# Patient Record
Sex: FEMALE | Race: WHITE | NOT HISPANIC OR LATINO | Employment: OTHER | URBAN - METROPOLITAN AREA
[De-identification: names, ages, dates, MRNs, and addresses within clinical notes are randomized per-mention and may not be internally consistent; named-entity substitution may affect disease eponyms.]

---

## 2017-01-17 DIAGNOSIS — N31.9 NEUROGENIC BLADDER: Primary | ICD-10-CM

## 2017-02-16 ENCOUNTER — HOSPITAL ENCOUNTER (OUTPATIENT)
Dept: GENERAL RADIOLOGY | Facility: CLINIC | Age: 67
Discharge: HOME | End: 2017-02-16
Attending: UROLOGY | Admitting: UROLOGY
Payer: MEDICARE

## 2017-02-16 ENCOUNTER — HOSPITAL ENCOUNTER (OUTPATIENT)
Dept: ULTRASOUND IMAGING | Facility: CLINIC | Age: 67
End: 2017-02-16
Attending: UROLOGY
Payer: MEDICARE

## 2017-02-16 ENCOUNTER — OFFICE VISIT (OUTPATIENT)
Dept: UROLOGY | Facility: CLINIC | Age: 67
End: 2017-02-16
Payer: MEDICARE

## 2017-02-16 VITALS — HEIGHT: 64 IN | WEIGHT: 119 LBS | BODY MASS INDEX: 20.32 KG/M2

## 2017-02-16 DIAGNOSIS — N31.9 NEUROGENIC BLADDER: ICD-10-CM

## 2017-02-16 DIAGNOSIS — N31.9 NEUROGENIC BLADDER: Primary | ICD-10-CM

## 2017-02-16 LAB — PR INTERVAL - MUSE: 15

## 2017-02-16 PROCEDURE — 76770 US EXAM ABDO BACK WALL COMP: CPT

## 2017-02-16 PROCEDURE — 74010 XR KUB: CPT | Mod: 52

## 2017-02-16 PROCEDURE — 99213 OFFICE O/P EST LOW 20 MIN: CPT | Mod: 25 | Performed by: UROLOGY

## 2017-02-16 PROCEDURE — 51798 US URINE CAPACITY MEASURE: CPT | Performed by: UROLOGY

## 2017-02-16 ASSESSMENT — PAIN SCALES - GENERAL: PAINLEVEL: NO PAIN (0)

## 2017-02-16 NOTE — LETTER
2/16/2017       RE: Sondra Castro  [address]  [place] [address]     Dear Colleague,    Thank you for referring your patient, Sondra Castro, to the [facility]OLOG[facility] [place] at [facility]. Please see a copy of my visit note below.    History: It is a great pleasure to see this very pleasant 66-year-old lady in follow-up consultation today.  She has a history of multiple sclerosis for many years which is now stable.  She has had no major flareups over the last 12 months.  She does take baclofen on a p.r.n. basis previous dilatation of the left renal pelvis as been noted on ultrasound examination although previous retrograde pyelography had shown no serious cause dilatation or obstruction it was decided that this required no treatment  Dexterity remained satisfactory, she does need occasional help with balance  Postvoid residual volume is 15 cc today.  There are no other remarkable features  KUB today shows no evidence of stones or other significant findings.  Ultrasound of the kidneys today shows improvement in dilatation of the left kidney and no other remarkable features    Past Medical History   Diagnosis Date     Anxiety      Fracture of right lower leg      Macular degeneration, dry      Left Eye     Macular degeneration, wet (H)      Right Eye     Menopausal disorder      age 49     Mitral valve disorders      Multiple sclerosis (H)      Optic neuritis 2007     PVD (posterior vitreous detachment), right eye      Unspecified nonsenile cataract      Right eye       Social History     Social History     Marital status:      Spouse name: N/A     Number of children: 7     Years of education: N/A     Occupational History     homemaker      Social History Main Topics     Smoking status: Former Smoker     Packs/day: 1.00     Types: Cigarettes     Quit date: 1/1/1981     Smokeless tobacco: Never Used     Alcohol use No     Drug use: No      "Sexual activity: Not Currently     Other Topics Concern      Service No     Blood Transfusions No     IVIG every 6 weeks     Caffeine Concern Yes     7 cups of coffee per day     Occupational Exposure No     Hobby Hazards No     Sleep Concern Yes     MS - night leg spasms     Stress Concern Yes     Weight Concern No     Special Diet Yes     avoids sugar     Back Care Yes     back pain     Exercise Yes     5 times per week - biking     Bike Helmet No     Seat Belt Yes     Self-Exams No     Social History Narrative       Past Surgical History   Procedure Laterality Date     Surgical history of -   10/05/04     Colonoscopy     Surgical history of -        Neuroma x 2      Hc vitrectomy with focal endolaser photocoagulation  8/8/2013     \"for glaucoma\" 5 separate times LE     Avastin (bevacizumab) 1.25 mg intravitreal injection od (right eye) Right      last 7/9/13       Family History   Problem Relation Age of Onset     C.A.D. Mother      CEREBROVASCULAR DISEASE Mother      Macular Degeneration Mother      Rheumatologic Disease Mother      Retinal detachment Mother      C.A.D. Father      DIABETES Father      Prostate Cancer Father      Macular Degeneration Father      Glaucoma Father      Hypertension No family hx of      Breast Cancer No family hx of      Cancer - colorectal No family hx of      Thyroid Disease Son          Current Outpatient Prescriptions:      Denosumab (PROLIA SC), , Disp: , Rfl:      VITAMIN D, CHOLECALCIFEROL, PO, Take by mouth daily, Disp: , Rfl:      aspirin 81 MG tablet, Take 1 tablet by mouth daily, Disp: , Rfl:      BEVACIZUMAB, AVASTIN, INJECTION 2.5MG, , Disp: , Rfl:      Multiple Vitamins-Minerals (PRESERVISION AREDS 2 PO), Take 2 capsules by mouth daily, Disp: , Rfl:      ARTIFICIAL TEARS 0.1-0.3 % SOLN, Apply 1 drop to eye as needed, Disp: , Rfl:      ascorbic acid (VITAMIN C) 1000 MG TABS, Take 1,000 mg by mouth daily, Disp: , Rfl:      fish oil-omega-3 fatty acids (FISH OIL) " "1000 MG capsule, Take 2 capsules by mouth daily., Disp: 180 capsule, Rfl: 3     CALCIUM 500 500 MG OR TABS, Take 3 tablets by mouth, Disp: , Rfl:      MULTIVITAMIN OR, Take 1 tablet by mouth., Disp: , Rfl:      TiZANidine HCl (ZANAFLEX PO), Reported on 2/16/2017, Disp: , Rfl:      cyclobenzaprine (FLEXERIL) 10 MG tablet, Take 1 tablet (10 mg) by mouth nightly as needed for muscle spasms (Patient not taking: Reported on 2/16/2017), Disp: 30 tablet, Rfl: 5    10 point ROS of systems including Constitutional, Eyes, Respiratory, Cardiovascular, Gastroenterology, Genitourinary, Integumentary, Muscularskeletal, Psychiatric were all negative except for pertinent positives noted in my HPI.    Examination:   Ht 1.613 m (5' 3.5\")  Wt 54 kg (119 lb)  LMP 04/01/1998  BMI 20.75 kg/m2  General Impression: Very pleasant lady in no acute distress, well-oriented in time place and person  Mental Status: Normal.  HEENT.  There is no evidence of jaundice there is slight paleness of the mucous membranes.  There are no other remarkable ages.    Skin: Skin is otherwise normal to examination  Respiratory System: The respiratory cycle is normal  Lymph Nodes: Not examined  Back/Flank Tenderness: There is no flank tenderness  Cardiovascular System: Not examined  Abdominal Examination: Not examined  Extremities: There is no significant peripheral edema  Genitial: Not examined  Rectal Examination: Not examined  Neurologic System: There has been no significant change in her neurological signs since previous examination    Impression: The situation is very stable.  There is no evidence of significant delay or obstruction and drainage of the bladder.  There is no evidence of any renal deterioration.  Symptoms of multiple sclerosis a very stable.  I would not change current management at this time  I did discuss the entire situation with the patient in detail.  I will see her promptly should there be any significant change in the situation.  I " "answered all questions    Plan: I will see her in 1 year for KUB, bladder scan, ultrasound of kidneys and clinical examination    Time: 15 minutes.  Greater than 50% was spent in discussion and consultation    \"This dictation was performed with voice recognition software and may contain errors,  o omissions and inadvertent word substitution.\"        Again, thank you for allowing me to participate in the care of your patient.      Sincerely,    Pee Agee MD      "

## 2017-02-16 NOTE — NURSING NOTE
Chief Complaint   Patient presents with     Clinic Care Coordination - Follow-up     Go over Results     Arina Wolfe LPN

## 2017-02-16 NOTE — PROGRESS NOTES
History: It is a great pleasure to see this very pleasant 66-year-old lady in follow-up consultation today.  She has a history of multiple sclerosis for many years which is now stable.  She has had no major flareups over the last 12 months.  She does take baclofen on a p.r.n. basis previous dilatation of the left renal pelvis as been noted on ultrasound examination although previous retrograde pyelography had shown no serious cause dilatation or obstruction it was decided that this required no treatment  Dexterity remained satisfactory, she does need occasional help with balance  Postvoid residual volume is 15 cc today.  There are no other remarkable features  KUB today shows no evidence of stones or other significant findings.  Ultrasound of the kidneys today shows improvement in dilatation of the left kidney and no other remarkable features    Past Medical History   Diagnosis Date     Anxiety      Fracture of right lower leg      Macular degeneration, dry      Left Eye     Macular degeneration, wet (H)      Right Eye     Menopausal disorder      age 49     Mitral valve disorders      Multiple sclerosis (H)      Optic neuritis 2007     PVD (posterior vitreous detachment), right eye      Unspecified nonsenile cataract      Right eye       Social History     Social History     Marital status:      Spouse name: N/A     Number of children: 7     Years of education: N/A     Occupational History     homemaker      Social History Main Topics     Smoking status: Former Smoker     Packs/day: 1.00     Types: Cigarettes     Quit date: 1/1/1981     Smokeless tobacco: Never Used     Alcohol use No     Drug use: No     Sexual activity: Not Currently     Other Topics Concern      Service No     Blood Transfusions No     IVIG every 6 weeks     Caffeine Concern Yes     7 cups of coffee per day     Occupational Exposure No     Hobby Hazards No     Sleep Concern Yes     MS - night leg spasms     Stress Concern Yes      "Weight Concern No     Special Diet Yes     avoids sugar     Back Care Yes     back pain     Exercise Yes     5 times per week - biking     Bike Helmet No     Seat Belt Yes     Self-Exams No     Social History Narrative       Past Surgical History   Procedure Laterality Date     Surgical history of -   10/05/04     Colonoscopy     Surgical history of -        Neuroma x 2      Hc vitrectomy with focal endolaser photocoagulation  8/8/2013     \"for glaucoma\" 5 separate times LE     Avastin (bevacizumab) 1.25 mg intravitreal injection od (right eye) Right      last 7/9/13       Family History   Problem Relation Age of Onset     C.A.D. Mother      CEREBROVASCULAR DISEASE Mother      Macular Degeneration Mother      Rheumatologic Disease Mother      Retinal detachment Mother      C.A.D. Father      DIABETES Father      Prostate Cancer Father      Macular Degeneration Father      Glaucoma Father      Hypertension No family hx of      Breast Cancer No family hx of      Cancer - colorectal No family hx of      Thyroid Disease Son          Current Outpatient Prescriptions:      Denosumab (PROLIA SC), , Disp: , Rfl:      VITAMIN D, CHOLECALCIFEROL, PO, Take by mouth daily, Disp: , Rfl:      aspirin 81 MG tablet, Take 1 tablet by mouth daily, Disp: , Rfl:      BEVACIZUMAB, AVASTIN, INJECTION 2.5MG, , Disp: , Rfl:      Multiple Vitamins-Minerals (PRESERVISION AREDS 2 PO), Take 2 capsules by mouth daily, Disp: , Rfl:      ARTIFICIAL TEARS 0.1-0.3 % SOLN, Apply 1 drop to eye as needed, Disp: , Rfl:      ascorbic acid (VITAMIN C) 1000 MG TABS, Take 1,000 mg by mouth daily, Disp: , Rfl:      fish oil-omega-3 fatty acids (FISH OIL) 1000 MG capsule, Take 2 capsules by mouth daily., Disp: 180 capsule, Rfl: 3     CALCIUM 500 500 MG OR TABS, Take 3 tablets by mouth, Disp: , Rfl:      MULTIVITAMIN OR, Take 1 tablet by mouth., Disp: , Rfl:      TiZANidine HCl (ZANAFLEX PO), Reported on 2/16/2017, Disp: , Rfl:      cyclobenzaprine (FLEXERIL) " "10 MG tablet, Take 1 tablet (10 mg) by mouth nightly as needed for muscle spasms (Patient not taking: Reported on 2/16/2017), Disp: 30 tablet, Rfl: 5    10 point ROS of systems including Constitutional, Eyes, Respiratory, Cardiovascular, Gastroenterology, Genitourinary, Integumentary, Muscularskeletal, Psychiatric were all negative except for pertinent positives noted in my HPI.    Examination:   Ht 1.613 m (5' 3.5\")  Wt 54 kg (119 lb)  LMP 04/01/1998  BMI 20.75 kg/m2  General Impression: Very pleasant lady in no acute distress, well-oriented in time place and person  Mental Status: Normal.  HEENT.  There is no evidence of jaundice there is slight paleness of the mucous membranes.  There are no other remarkable ages.    Skin: Skin is otherwise normal to examination  Respiratory System: The respiratory cycle is normal  Lymph Nodes: Not examined  Back/Flank Tenderness: There is no flank tenderness  Cardiovascular System: Not examined  Abdominal Examination: Not examined  Extremities: There is no significant peripheral edema  Genitial: Not examined  Rectal Examination: Not examined  Neurologic System: There has been no significant change in her neurological signs since previous examination    Impression: The situation is very stable.  There is no evidence of significant delay or obstruction and drainage of the bladder.  There is no evidence of any renal deterioration.  Symptoms of multiple sclerosis a very stable.  I would not change current management at this time  I did discuss the entire situation with the patient in detail.  I will see her promptly should there be any significant change in the situation.  I answered all questions    Plan: I will see her in 1 year for KUB, bladder scan, ultrasound of kidneys and clinical examination    Time: 15 minutes.  Greater than 50% was spent in discussion and consultation    \"This dictation was performed with voice recognition software and may contain errors,  o omissions and " "inadvertent word substitution.\"      "

## 2017-02-16 NOTE — MR AVS SNAPSHOT
After Visit Summary   2/16/2017    Sondra Castro    MRN: [id number]           Patient Information     Date Of Birth          1950        Visit Information        Provider Department      2/16/2017 2:20 PM Pee Agee MD [facility] [place]        Today's Diagnoses     Neurogenic bladder    -  1       Follow-ups after your visit        Follow-up notes from your care team     Return for Physical Exam, KUB, ultrasound of kidneys, AUA score and bladder scan.      Your next 10 appointments already scheduled     Jun 12, 2017 12:00 PM CDT   VISUAL FIELD with [facility] EYE VISUAL FIELD   Eye Clinic ([facility])    [address]  [address]  [address]  [facility] [address]   [phone]            Jun 12, 2017 12:30 PM CDT   RETURN GLAUCOMA with Nanci Briscoe MD   Eye Clinic ([facility])    [address]  [address]  [address]  [facility] [address]   [phone]            Jul 06, 2017 12:15 PM CDT   RETURN RETINA with Kesha Ptael MD   Eye Clinic ([facility])    [address]  [address]  [address]  [facility] [address]   [phone]              Future tests that were ordered for you today     Open Future Orders        Priority Expected Expires Ordered    US Renal Complete [[id number]] Routine 5/17/2017 2/16/2018 2/16/2017    XR KUB [[id number]] Routine 2/16/2017 2/16/2018 2/16/2017    UA without Microscopic Routine  2/16/2018 2/16/2017            Who to contact     If you have questions or need follow up information about today's clinic visit or your schedule please contact [facility] UROLOGY CLIN[facility] [place] directly at [phone].  Normal or non-critical lab and imaging results will be communicated to you by MyChart, letter or phone within 4 business days after the clinic has received the results. If you do not hear from us within 7 days,  "please contact the clinic through QuadROI or phone. If you have a critical or abnormal lab result, we will notify you by phone as soon as possible.  Submit refill requests through QuadROI or call your pharmacy and they will forward the refill request to us. Please allow 3 business days for your refill to be completed.          Additional Information About Your Visit        DwllrharDSO Interactive Information     QuadROI lets you send messages to your doctor, view your test results, renew your prescriptions, schedule appointments and more. To sign up, go to [email or web address] . Click on \"Log in\" on the left side of the screen, which will take you to the Welcome page. Then click on \"Sign up Now\" on the right side of the page.     You will be asked to enter the access code listed below, as well as some personal information. Please follow the directions to create your username and password.     Your access code is: [email or web address]  Expires: 2017  3:01 PM     Your access code will  in 90 days. If you need help or a new code, please call your [place] clinic or [phone].        Care EveryWhere ID     This is your Care EveryWhere ID. This could be used by other organizations to access your [place] medical records  [id number]        Your Vitals Were     Height Last Period BMI (Body Mass Index)             1.613 m (5' 3.5\") 1998 20.75 kg/m2          Blood Pressure from Last 3 Encounters:   01/08/15 125/61   14 120/66   10/06/14 112/66    Weight from Last 3 Encounters:   17 54 kg (119 lb)   01/08/15 54 kg (119 lb)   14 54 kg (119 lb)              We Performed the Following     MEASURE POST-VOID RESIDUAL URINE/BLADDER CAPACITY, US NON-IMAGING ([id number])        Primary Care Provider Office Phone # Fax #    Kesha Prieto [phone]       [facility] [address]  [place] [address]        Thank you!     Thank you for choosing [facility] UROLOGY " [facility]  for your care. Our goal is always to provide you with excellent care. Hearing back from our patients is one way we can continue to improve our services. Please take a few minutes to complete the written survey that you may receive in the mail after your visit with us. Thank you!             Your Updated Medication List - Protect others around you: Learn how to safely use, store and throw away your medicines at www.disposemymeds.org.          This list is accurate as of: 2/16/17  3:01 PM.  Always use your most recent med list.                   Brand Name Dispense Instructions for use    ascorbic acid 1000 MG Tabs    vitamin C     Take 1,000 mg by mouth daily       aspirin 81 MG tablet      Take 1 tablet by mouth daily       BEVACIZUMAB (AVASTIN) INJECTION 2.5MG          calcium 500 1250 (500 CA) MG Tabs tablet   Generic drug:  calcium carbonate      Take 3 tablets by mouth       cyclobenzaprine 10 MG tablet    FLEXERIL    30 tablet    Take 1 tablet (10 mg) by mouth nightly as needed for muscle spasms       fish oil-omega-3 fatty acids 1000 MG capsule     180 capsule    Take 2 capsules by mouth daily.       hypromellose-dextran 0.3-0.1% opthalmic solution      Apply 1 drop to eye as needed       * PRESERVISION AREDS 2 PO      Take 2 capsules by mouth daily       * MULTIVITAMIN PO      Take 1 tablet by mouth.       PROLIA SC          VITAMIN D (CHOLECALCIFEROL) PO      Take by mouth daily       ZANAFLEX PO      Reported on 2/16/2017       * Notice:  This list has 2 medication(s) that are the same as other medications prescribed for you. Read the directions carefully, and ask your doctor or other care provider to review them with you.

## 2017-06-12 ENCOUNTER — OFFICE VISIT (OUTPATIENT)
Dept: OPHTHALMOLOGY | Facility: CLINIC | Age: 67
End: 2017-06-12
Attending: OPHTHALMOLOGY
Payer: MEDICARE

## 2017-06-12 DIAGNOSIS — H40.003 GLAUCOMA SUSPECT OF BOTH EYES: Primary | ICD-10-CM

## 2017-06-12 PROCEDURE — 92083 EXTENDED VISUAL FIELD XM: CPT | Mod: ZF | Performed by: OPHTHALMOLOGY

## 2017-06-12 PROCEDURE — 99213 OFFICE O/P EST LOW 20 MIN: CPT | Mod: ZF

## 2017-06-12 PROCEDURE — 92015 DETERMINE REFRACTIVE STATE: CPT | Mod: GY,ZF

## 2017-06-12 NOTE — MR AVS SNAPSHOT
After Visit Summary   6/12/2017    Sondra Castro    MRN: [id number]           Patient Information     Date Of Birth          1950        Visit Information        Provider Department      6/12/2017 12:30 PM Nanci Briscoe MD Eye Clinic        Today's Diagnoses     Glaucoma suspect of both eyes    -  1       Follow-ups after your visit        Follow-up notes from your care team     Return in about 1 year (around 6/12/2018) for visual field LVC, OCT rnfl.      Your next 10 appointments already scheduled     Jul 06, 2017 12:15 PM CDT   RETURN RETINA with Kesha Patel MD   Eye Clinic ([facility])    [address]  [address]   [address] [address]  [facility] [address] [phone]            Jun 13, 2018 12:45 PM CDT   RETURN GLAUCOMA with Nanci Briscoe MD   Eye Clinic ([facility])    [address]  [address]   [address] [address]  [facility] [id number] [phone]              Who to contact     Please call your clinic at [phone] to:    Ask questions about your health    Make or cancel appointments    Discuss your medicines    Learn about your test results    Speak to your doctor   If you have compliments or concerns about an experience at your clinic, or if you wish to file a complaint, please contact [facility] Patient Relations at [phone] or email us at [email or web address]         Additional Information About Your Visit        MyChart Information     MadRat Gamest is an electronic gateway that provides easy, online access to your medical records. With [employer], you can request a clinic appointment, read your test results, renew a prescription or communicate with your care team.     To sign up for Mad[employer] Gamest visit the website at [email or web address]   You will be asked to enter the access code listed below, as well as some personal information. Please follow the directions to create your  username and password.     Your access code is: [email or web address]  Expires: 2017  6:31 AM     Your access code will  in 90 days. If you need help or a new code, please contact your [facility] or call [phone] for assistance.        Care EveryWhere ID     This is your Care EveryWhere ID. This could be used by other organizations to access your [place] medical records  [id number]        Your Vitals Were     Last Period                   1998            Blood Pressure from Last 3 Encounters:   01/08/15 125/61   14 120/66   10/06/14 112/66    Weight from Last 3 Encounters:   17 54 kg (119 lb)   01/08/15 54 kg (119 lb)   14 54 kg (119 lb)              We Performed the Following     [facility]         Primary Care Provider Office Phone # Fax #    Kesha Prieto [phone]       [facility] [address] [facility] [address]        Thank you!     Thank you for choosing EYE CLINIC  for your care. Our goal is always to provide you with excellent care. Hearing back from our patients is one way we can continue to improve our services. Please take a few minutes to complete the written survey that you may receive in the mail after your visit with us. Thank you!             Your Updated Medication List - Protect others around you: Learn how to safely use, store and throw away your medicines at www.disposemymeds.org.          This list is accurate as of: 17 11:59 PM.  Always use your most recent med list.                   Brand Name Dispense Instructions for use    ascorbic acid 1000 MG Tabs    vitamin C     Take 1,000 mg by mouth daily       aspirin 81 MG tablet      Take 1 tablet by mouth daily       BEVACIZUMAB (AVASTIN) INJECTION 2.5MG          calcium 500 1250 (500 CA) MG Tabs tablet   Generic drug:  calcium carbonate      Take 3 tablets by mouth       cyclobenzaprine 10 MG tablet    FLEXERIL    30 tablet     Take 1 tablet (10 mg) by mouth nightly as needed for muscle spasms       fish oil-omega-3 fatty acids 1000 MG capsule     180 capsule    Take 2 capsules by mouth daily.       hypromellose-dextran 0.3-0.1% opthalmic solution      Apply 1 drop to eye as needed       * PRESERVISION AREDS 2 PO      Take 2 capsules by mouth daily       * MULTIVITAMIN PO      Take 1 tablet by mouth.       PROLIA SC          VITAMIN D (CHOLECALCIFEROL) PO      Take by mouth daily       ZANAFLEX PO      Reported on 2/16/2017       * Notice:  This list has 2 medication(s) that are the same as other medications prescribed for you. Read the directions carefully, and ask your doctor or other care provider to review them with you.

## 2017-06-15 ASSESSMENT — EXTERNAL EXAM - LEFT EYE: OS_EXAM: NORMAL

## 2017-06-15 ASSESSMENT — CUP TO DISC RATIO
OD_RATIO: 0.3
OS_RATIO: 0.4

## 2017-06-15 ASSESSMENT — SLIT LAMP EXAM - LIDS
COMMENTS: NORMAL
COMMENTS: NORMAL

## 2017-06-15 ASSESSMENT — EXTERNAL EXAM - RIGHT EYE: OD_EXAM: NORMAL

## 2017-06-15 NOTE — PROGRESS NOTES
History of suspect glaucoma s/p SLT (or ALT) both eyes, presenting for follow up.    HPI:   OK but has ache left eye for a couple of weeks.     Current Medications:   None    Octopus LVC left eye Similar to previous mena visual field (HVF) size V with some scatter but fairly normal    OCT retinal nerve fiber layer stable both eyes    Right eye normal   Left eye thin ST as before    1. Primary open angle glaucoma (POAG) suspect with mild cup:disc asymmetry   LVC full both eyes     Scatter on visual field left eye but also has diagnosis of optic neuritis left eye   OCT stable both eyes    Not on glaucoma drops currently   S/p Selected laser trabeculoplasty (SLT) or Argon laser trabeculoplasty both eyes     3. Narrow angle right eye with plateau configuation, stable   - Consider laser peripheral iridotomy (LPI) if progressive    4. Optic neuritis left eye - history of multiple sclerosis     5. Migraine aura without headache    Now with brief aura (few seconds) fairly frequently   Seen by Dr Hernandez in past    6. Age related macular degeneration    Seen by Dr. Patel   History of Wet Age related macular degeneration right eye; Dry left eye      Avastin x 4 right eye (most recent 7/9/2013)    7. Senile nuclear sclerosis both eyes    Recently started noting streamers around lights at night    Plan:   Return to clinic 1 year with LVC visual field both eyes and OCT retinal nerve fiber layer     Attending Physician Attestation:  Complete documentation of historical and exam elements from today's encounter can be found in the full encounter summary report (not reduplicated in this progress note). I personally obtained the chief complaint(s) and history of present illness. I confirmed and edited asnecessary the review of systems, past medical/surgical history, family history, social history, and examination findings as documented by others; and I examined the patient myself. I personally reviewed the relevant tests, images,  and reports as documented above. I formulated and edited as necessary the assessment and plan and discussed the findings and management plan with the patient and family.  EPIC (EMR) was not functioning during this appointment and the data was entered after the appointment when EPIC was available.

## 2017-06-16 ASSESSMENT — TONOMETRY
IOP_METHOD: APPLANATION
OD_IOP_MMHG: 15
OS_IOP_MMHG: 16

## 2017-06-16 ASSESSMENT — REFRACTION_MANIFEST
OD_AXIS: 015
OD_CYLINDER: +1.25
OD_SPHERE: +2.75
OS_CYLINDER: SPHERE
OS_ADD: +2.75
OS_SPHERE: +3.00
OD_ADD: +2.75

## 2017-06-16 ASSESSMENT — VISUAL ACUITY
OS_SC: 20/30
OS_SC+: -1
OD_SC+: -1
METHOD: SNELLEN - LINEAR
OD_SC: 20/30

## 2017-06-16 ASSESSMENT — CONF VISUAL FIELD
OS_NORMAL: 1
OD_NORMAL: 1

## 2017-06-16 NOTE — NURSING NOTE
Chief Complaints and History of Present Illnesses   Patient presents with     Glaucoma Suspect Follow Up     HPI    Affected eye(s):  Both   Symptoms:     No blurred vision   No decreased vision         Do you have eye pain now?:  No      Comments:  Lily ANDREWS 9:08 AM June 16, 2017

## 2017-06-29 DIAGNOSIS — H35.3130 BILATERAL DRY AGE-RELATED MACULAR DEGENERATION: Primary | ICD-10-CM

## 2017-07-20 ENCOUNTER — OFFICE VISIT (OUTPATIENT)
Dept: OPHTHALMOLOGY | Facility: CLINIC | Age: 67
End: 2017-07-20
Attending: OPHTHALMOLOGY
Payer: MEDICARE

## 2017-07-20 DIAGNOSIS — H35.3130 BILATERAL DRY AGE-RELATED MACULAR DEGENERATION: ICD-10-CM

## 2017-07-20 PROCEDURE — 92250 FUNDUS PHOTOGRAPHY W/I&R: CPT | Mod: ZF | Performed by: OPHTHALMOLOGY

## 2017-07-20 PROCEDURE — 92134 CPTRZ OPH DX IMG PST SGM RTA: CPT | Mod: ZF | Performed by: OPHTHALMOLOGY

## 2017-07-20 PROCEDURE — 99212 OFFICE O/P EST SF 10 MIN: CPT | Mod: ZF

## 2017-07-20 ASSESSMENT — EXTERNAL EXAM - RIGHT EYE: OD_EXAM: NORMAL

## 2017-07-20 ASSESSMENT — EXTERNAL EXAM - LEFT EYE: OS_EXAM: NORMAL

## 2017-07-20 ASSESSMENT — REFRACTION_WEARINGRX
OD_SPHERE: +3.00
OS_AXIS: 055
OD_CYLINDER: +1.75
OS_CYLINDER: +0.25
OS_ADD: +2.50
OD_ADD: +2.50
SPECS_TYPE: PAL
OS_SPHERE: +3.75
OD_AXIS: 178

## 2017-07-20 ASSESSMENT — TONOMETRY
OS_IOP_MMHG: 13
IOP_METHOD: TONOPEN
OD_IOP_MMHG: 10

## 2017-07-20 ASSESSMENT — CUP TO DISC RATIO
OD_RATIO: 0.3
OS_RATIO: 0.4

## 2017-07-20 ASSESSMENT — CONF VISUAL FIELD
OD_NORMAL: 1
OS_NORMAL: 1

## 2017-07-20 ASSESSMENT — VISUAL ACUITY
OD_CC: 20/30
CORRECTION_TYPE: GLASSES
OS_CC: 20/25
METHOD: SNELLEN - LINEAR

## 2017-07-20 ASSESSMENT — SLIT LAMP EXAM - LIDS
COMMENTS: NORMAL
COMMENTS: NORMAL

## 2017-07-20 NOTE — MR AVS SNAPSHOT
After Visit Summary   7/20/2017    Sondra Castro    MRN: [id number]           Patient Information     Date Of Birth          1950        Visit Information        Provider Department      7/20/2017 1:00 PM Kesha Patel MD [facility] Clinic        Today's Diagnoses     Bilateral dry age-related macular degeneration           Follow-ups after your visit        Follow-up notes from your care team     Return in about 6 months (around 1/20/2018) for OCT AF.      Your next 10 appointments already scheduled     Jan 18, 2018 12:45 PM CST   RETURN RETINA with Kesha Patel MD   Eye Clinic ([facility])    [address]  [address]   [address] Clin [address]  [facility] [address] [phone]            Jun 13, 2018 12:45 PM CDT   RETURN GLAUCOMA with Nanci Briscoe MD   Eye Clinic ([facility])    [address]  [address]   [address] Clin [address]  [facility] [address] [phone]              Who to contact     Please call your clinic at [phone] to:    Ask questions about your health    Make or cancel appointments    Discuss your medicines    Learn about your test results    Speak to your doctor   If you have compliments or concerns about an experience at your clinic, or if you wish to file a complaint, please contact [facility] Patient Relations at [phone] or email us at [email or web address]         Additional Information About Your Visit        MyChart Information     [employer] is an electronic gateway that provides easy, online access to your medical records. With [employer], you can request a clinic appointment, read your test results, renew a prescription or communicate with your care team.     To sign up for [employer] visit the website at [email or web address]   You will be asked to enter the access code listed below, as well as some personal information. Please follow the directions to create your  username and password.     Your access code is: [email or web address]  Expires: 2017  6:31 AM     Your access code will  in 90 days. If you need help or a new code, please contact your [facility] or call [phone] for assistance.        Care EveryWhere ID     This is your Care EveryWhere ID. This could be used by other organizations to access your [facility] medical records  [id number]        Your Vitals Were     Last Period                   1998            Blood Pressure from Last 3 Encounters:   01/08/15 125/61   14 120/66   10/06/14 112/66    Weight from Last 3 Encounters:   17 54 kg (119 lb)   01/08/15 54 kg (119 lb)   14 54 kg (119 lb)              We Performed the Following     Fundus Autofluorescence Image (FAF) OU (both eyes)     OCT Retina Spectralis OU (both eyes)        Primary Care Provider Office Phone # Fax #    Kesha Prieto [phone]       [address] [address]        Equal Access to Services     [facility]: Hadii aad ku hadasho Sosudhaali, waaxda luqadaha, qaybta kaalmada adeashyada, mary mehta . So [facility] [phone].    ATENCIÓN: Si habla español, tiene a panchal disposición servicios gratuitos de asistencia lingüística. Ll[facility] [phone].    We comply with applicable federal civil rights laws and [place] laws. We do not discriminate on the basis of race, color, national origin, age, disability sex, sexual orientation or gender identity.            Thank you!     Thank you for choosing EYE CLINIC  for your care. Our goal is always to provide you with excellent care. Hearing back from our patients is one way we can continue to improve our services. Please take a few minutes to complete the written survey that you may receive in the mail after your visit with us. Thank you!             Your Updated Medication List - Protect others around you: Learn  how to safely use, store and throw away your medicines at www.disposemymeds.org.          This list is accurate as of: 7/20/17  2:15 PM.  Always use your most recent med list.                   Brand Name Dispense Instructions for use Diagnosis    ascorbic acid 1000 MG Tabs    vitamin C     Take 1,000 mg by mouth daily        aspirin 81 MG tablet      Take 1 tablet by mouth daily        BEVACIZUMAB (AVASTIN) INJECTION 2.5MG           calcium 500 1250 (500 CA) MG Tabs tablet   Generic drug:  calcium carbonate      Take 3 tablets by mouth        cyclobenzaprine 10 MG tablet    FLEXERIL    30 tablet    Take 1 tablet (10 mg) by mouth nightly as needed for muscle spasms    Sciatica, unspecified laterality       fish oil-omega-3 fatty acids 1000 MG capsule     180 capsule    Take 2 capsules by mouth daily.    Routine general medical examination at a health care facility       hypromellose-dextran 0.3-0.1% opthalmic solution      Apply 1 drop to eye as needed        * PRESERVISION AREDS 2 PO      Take 2 capsules by mouth daily        * MULTIVITAMIN PO      Take 1 tablet by mouth.        PROLIA SC           VITAMIN D (CHOLECALCIFEROL) PO      Take by mouth daily        ZANAFLEX PO      Reported on 2/16/2017        * Notice:  This list has 2 medication(s) that are the same as other medications prescribed for you. Read the directions carefully, and ask your doctor or other care provider to review them with you.

## 2017-07-20 NOTE — NURSING NOTE
Chief Complaints and History of Present Illnesses   Patient presents with     Follow Up For     s/p Bilateral dry age-related macular degeneration (Primary Dx)     HPI    Affected eye(s):  Both   Symptoms:     No blurred vision   No decreased vision   No floaters   No flashes   Tearing   Dryness      Duration:  6 months   Frequency:  Constant       Do you have eye pain now?:  No      Comments:  States va is the same since last visit.   Galen Fam  12:44 PM July 20, 2017

## 2017-07-20 NOTE — LETTER
7/20/2017       RE: Sondra Castro  [address] [place] [place] [address]     Dear Colleague,    Thank you for referring your patient, Sondra Castro, to the EYE CLINIC at [facility]. Please see a copy of my visit note below.    CC: follow up Age related macular degeneration     HPI: no change from last visit. Vision is worse at near. Still notes persistent shimmering lights right eye  especially when exercising. Has previously been diagnosed with Ocular Migraines and has seen Dr. Hernandez and is followed by Dr. Ames. Symptoms have been different as of late. No pain, irritation, discomfort or changes in floaters.    OCT 7-20-17  Right eye: drusen Retinal pigment epithelium changes, no subretinal fluid; stable  Left eye: few drusen; stable    FAF 7-20-17  RE: mottled nasal macular hypo/hyperAF, stable  LE: minimal ST macular hypoAF, stable    Assessment & Plan:  1. History of Exudative Age related macular degeneration right eye     - s/p previous Avastin (x4 in 7/9/2013)   - No activity   - Observe    2. mild Age related macular degeneration left eye    - AREDS vitamins, Amsler grid monitoring weekly OCT stable, no injections needed    - Continue to Monitor    3. Posterior vitreous detachment (PVD) both eyes    - Retina detachment precautions were discussed with the patient (presence or increased in flashes, floaters or a curtain in the visual field) and was asked to return if any of the those occur    4. Primary open angle glaucoma (POAG) suspect with mild cup:disc asymmetry  - Mildly abnormal visual field left eye in April  - IOP and exam stable, not on glaucoma drops currently  - S/p Selected laser trabeculoplasty (SLT) or Argon laser trabeculoplasty both eyes   - Narrow angle right eye with plateau configuation but both angles open.  - Followed by Dr. Briscoe    4. Optic neuritis left eye and history of Migraine aura without headache   - f/b Dr Hernandez. History of multiple sclerosis      5. Cataracts both eyes - observe for now    6. History of multiple sclerosis diagnosed several ys ago  History of uveitis both eyes   History of taken IV IG  And steroids  currently without  Treatment    Return to clinic 6-9 months, OCT and autofluorescence both eyes     Pedro Salinas MD, PhD  Vitreoretinal Surgery Fellow      ~~~~~~~~~~~~~~~~~~~~~~~~~~~~~~~~~~   Complete documentation of historical and exam elements from today's encounter can be found in the full encounter summary report (not reduplicated in this progress note).  I personally obtained the chief complaint(s) and history of present illness.  I confirmed and edited as necessary the review of systems, past medical/surgical history, family history, social history, and examination findings as documented by others; and I examined the patient myself.  I personally reviewed the relevant tests, images, and reports as documented above.  I formulated and edited as necessary the assessment and plan and discussed the findings and management plan with the patient and family    Kesha Patel MD  .  Retina Service   Department of Ophthalmology and Visual Neurosciences   [facility]  Phone: [phone]   Fax: [phone]

## 2017-12-29 DIAGNOSIS — N20.0 KIDNEY STONE: ICD-10-CM

## 2017-12-29 DIAGNOSIS — G35 MS (MULTIPLE SCLEROSIS) (H): Primary | ICD-10-CM

## 2018-01-23 DIAGNOSIS — H35.3130 BILATERAL NONEXUDATIVE AGE-RELATED MACULAR DEGENERATION: Primary | ICD-10-CM

## 2018-02-08 ENCOUNTER — OFFICE VISIT (OUTPATIENT)
Dept: OPHTHALMOLOGY | Facility: CLINIC | Age: 68
End: 2018-02-08
Attending: OPHTHALMOLOGY
Payer: MEDICARE

## 2018-02-08 DIAGNOSIS — H35.3130 BILATERAL NONEXUDATIVE AGE-RELATED MACULAR DEGENERATION: ICD-10-CM

## 2018-02-08 PROCEDURE — G0463 HOSPITAL OUTPT CLINIC VISIT: HCPCS | Mod: ZF

## 2018-02-08 PROCEDURE — 92134 CPTRZ OPH DX IMG PST SGM RTA: CPT | Mod: ZF | Performed by: OPHTHALMOLOGY

## 2018-02-08 PROCEDURE — 92250 FUNDUS PHOTOGRAPHY W/I&R: CPT | Mod: ZF | Performed by: OPHTHALMOLOGY

## 2018-02-08 ASSESSMENT — REFRACTION_WEARINGRX
OD_CYLINDER: +1.75
OD_AXIS: 015
OS_CYLINDER: +0.50
OS_ADD: +2.75
OS_AXIS: 055
OS_SPHERE: +3.75
OS_ADD: +2.50
OD_ADD: +2.50
SPECS_TYPE: PAL
OD_SPHERE: +3.00
OS_AXIS: 040
OD_SPHERE: +3.00
OS_SPHERE: +3.00
OS_CYLINDER: +0.25
OD_ADD: +2.75
OD_CYLINDER: +1.00
OD_AXIS: 178

## 2018-02-08 ASSESSMENT — CONF VISUAL FIELD
METHOD: COUNTING FINGERS
OS_NORMAL: 1
OD_NORMAL: 1

## 2018-02-08 ASSESSMENT — VISUAL ACUITY
CORRECTION_TYPE: GLASSES
OD_CC: 20/30
METHOD: SNELLEN - LINEAR
OD_CC+: -2
OD_CC: 20/30
OS_CC: 20/40
METHOD: SNELLEN - LINEAR
OS_CC: 20/30
CORRECTION_TYPE: GLASSES

## 2018-02-08 ASSESSMENT — EXTERNAL EXAM - LEFT EYE: OS_EXAM: NORMAL

## 2018-02-08 ASSESSMENT — EXTERNAL EXAM - RIGHT EYE: OD_EXAM: NORMAL

## 2018-02-08 ASSESSMENT — TONOMETRY
OD_IOP_MMHG: 16
OS_IOP_MMHG: 20
IOP_METHOD: TONOPEN

## 2018-02-08 ASSESSMENT — SLIT LAMP EXAM - LIDS
COMMENTS: NORMAL
COMMENTS: NORMAL

## 2018-02-08 ASSESSMENT — CUP TO DISC RATIO
OS_RATIO: 0.4
OD_RATIO: 0.3

## 2018-02-08 NOTE — NURSING NOTE
Chief Complaints and History of Present Illnesses   Patient presents with     Follow Up For     Bilateral dry age-related macular degeneration     HPI    Affected eye(s):  Both   Symptoms:        Frequency:  Constant       Do you have eye pain now?:  No      Comments:  States that the va from the last MR  +floaters have changed a little bit  No red watery or dry  Ember Gunter COT 12:47 PM February 8, 2018

## 2018-02-08 NOTE — PROGRESS NOTES
CC: follow up Age related macular degeneration     HPI: Reports difficulty with contrast now compared to last visit. Still notes persistent shimmering lights right eye  especially when exercising. Has previously been diagnosed with Ocular Migraines and has seen Dr. Hernandez and is followed by Dr. Ames. Symptoms have been different as of late. No new flashes/floaters.     OCT 2-8-18  Right eye: drusen Retinal pigment epithelium changes, no subretinal fluid; stable  Left eye: few drusen; stable. Vitreous attached    FAF 2-8-18  RE: mottled nasal macular hypo/hyperAF, stable  LE: minimal ST macular hypoAF, stable    Assessment & Plan:  1. History of Exudative Age related macular degeneration right eye     - s/p previous Avastin (x4 in 7/9/2013)   - No activity   - Observe    2. mild Age related macular degeneration left eye    - AREDS vitamins, Amsler grid monitoring weekly OCT stable, no injections needed    - Continue to Monitor   - (+) FH: both parents had Age related macular degeneration     3. Posterior vitreous detachment (PVD) both eyes    - Retina detachment precautions were discussed with the patient (presence or increased in flashes, floaters or a curtain in the visual field) and was asked to return if any of the those occur    4. Primary open angle glaucoma (POAG) suspect with mild cup:disc asymmetry  - (+) FH: dad had glaucoma  - Mildly abnormal visual field left eye in April  - IOP and exam stable, not on glaucoma drops currently  - S/p Selected laser trabeculoplasty (SLT) or Argon laser trabeculoplasty both eyes   - Narrow angle right eye with plateau configuation but both angles open.  - Followed by Dr. Briscoe    4. Optic neuritis left eye and history of Migraine aura without headache   - seen and evaluated by Dr. Hernandez in the past    5. Cataracts both eyes - observe for now    6. History of multiple sclerosis diagnosed several ys ago  History of uveitis both eyes   History of taken IV IG  And  steroids  currently without Treatment    Return to clinic 6-9 months, OCT and autofluorescence both eyes     Bradley Frausto MD  Ophthalmology, PGY-3    ~~~~~~~~~~~~~~~~~~~~~~~~~~~~~~~~~~   Complete documentation of historical and exam elements from today's encounter can be found in the full encounter summary report (not reduplicated in this progress note).  I personally obtained the chief complaint(s) and history of present illness.  I confirmed and edited as necessary the review of systems, past medical/surgical history, family history, social history, and examination findings as documented by others; and I examined the patient myself.  I personally reviewed the relevant tests, images, and reports as documented above.  I formulated and edited as necessary the assessment and plan and discussed the findings and management plan with the patient and family    Kesha Patel MD  .  Retina Service   Department of Ophthalmology and Visual Neurosciences   [facility]  Phone: [phone]   Fax: [phone]

## 2018-02-08 NOTE — MR AVS SNAPSHOT
After Visit Summary   2/8/2018    Sondra Castro    MRN: [id number]           Patient Information     Date Of Birth          1950        Visit Information        Provider Department      2/8/2018 12:30 PM Kesha Patel MD Eye Clinic        Today's Diagnoses     Bilateral nonexudative age-related macular degeneration           Follow-ups after your visit        Follow-up notes from your care team     Return in about 9 months (around 11/8/2018) for OCT AF.      Your next 10 appointments already scheduled     Feb 20, 2018 11:10 AM CST   US RENAL COMPLETE with [email or web address]   [facility] Ultrasound ([facility])    [address] [facility] [address] [phone]           Please bring a list of your medicines (including vitamins, minerals and over-the-counter drugs). Also, tell your doctor about any allergies you may have. Wear comfortable clothes and leave your valuables at home.  You do not need to do anything special to prepare for your exam.  Please call the Imaging Department at your exam site with any questions.            Feb 20, 2018 11:40 AM CST   XR KUB with SHXR3   [facility] Radiology ([facility])    [address] [id number] [phone]           Please bring a list of your current medicines to your exam. (Include vitamins, minerals and over-thecounter medicines.) Leave your valuables at home.  Tell your doctor if there is a chance you may be pregnant.  You do not need to do anything special for this exam.            Feb 20, 2018  1:00 PM CST   Return Visit with Pee Agee MD   [facility] [place] ([facility])    [address]  [address]  [facility] [address]   [phone]            Jun 13, 2018 12:45 PM CDT   RETURN GLAUCOMA with Nanci Briscoe MD   Eye Clinic ([facility])    [address]  Clin [address]a  [facility] [address]   [phone]            Sep 13, 2018 12:30 PM CDT   RETURN RETINA with Kesha Patel MD   Eye Clinic ([facility])    [address]  [address] Clin [address]a  [facility] [address]   [phone]              Future tests that were ordered for you today     Open Future Orders        Priority Expected Expires Ordered    OCT Retina Spectralis OU (both eyes) Routine  2019            Who to contact     Please call your clinic at [phone] to:    Ask questions about your health    Make or cancel appointments    Discuss your medicines    Learn about your test results    Speak to your doctor            Additional Information About Your Visit        MyChart Information     [employer] is an electronic gateway that provides easy, online access to your medical records. With [employer], you can request a clinic appointment, read your test results, renew a prescription or communicate with your care team.     To sign up for [employer] visit the website at [email or web address]   You will be asked to enter the access code listed below, as well as some personal information. Please follow the directions to create your username and password.     Your access code is: [email or web address]  Expires: 2018  6:30 AM     Your access code will  in 90 days. If you need help or a new code, please contact your [facility] or call [phone] for assistance.        Care EveryWhere ID     This is your Care EveryWhere ID. This could be used by other organizations to access your [place] medical records  [id number]        Your Vitals Were     Last Period                   1998            Blood Pressure from Last 3 Encounters:   01/08/15 125/61   14 120/66   10/06/14 112/66    Weight from Last 3 Encounters:   17 54 kg (119 lb)   01/08/15 54 kg (119 lb)   14 54 kg (119 lb)              We Performed  the Following     Fundus Autofluorescence Image (FAF) OU (both eyes)     OCT Retina Spectralis OU (both eyes)        Primary Care Provider Office Phone # Fax #    Kesha Prieto [phone]       [facility] [address]  [place] [address]        Equal Access to Services     [facility] : Hadii aad ku hadasho Soomaali, waaxda luqadaha, qaybta kaalmada adeegyada, waxalfred inman haymarian dalia nickhuma mehta . So [facility] [phone].    ATENCIÓN: Si habla español, tiene a panchal disposición servicios gratuitos de asistencia lingüística. Vivian al [phone].    We comply with applicable federal civil rights laws and [place] laws. We do not discriminate on the basis of race, color, national origin, age, disability, sex, sexual orientation, or gender identity.            Thank you!     Thank you for choosing EYE CLINIC  for your care. Our goal is always to provide you with excellent care. Hearing back from our patients is one way we can continue to improve our services. Please take a few minutes to complete the written survey that you may receive in the mail after your visit with us. Thank you!             Your Updated Medication List - Protect others around you: Learn how to safely use, store and throw away your medicines at www.disposemymeds.org.          This list is accurate as of 2/8/18  2:07 PM.  Always use your most recent med list.                   Brand Name Dispense Instructions for use Diagnosis    ascorbic acid 1000 MG Tabs    vitamin C     Take 1,000 mg by mouth daily        aspirin 81 MG tablet      Take 1 tablet by mouth daily        BEVACIZUMAB (AVASTIN) INJECTION 2.5MG           calcium 500 1250 (500 CA) MG Tabs tablet   Generic drug:  calcium carbonate      Take 3 tablets by mouth        cyclobenzaprine 10 MG tablet    FLEXERIL    30 tablet    Take 1 tablet (10 mg) by mouth nightly as needed for muscle spasms    Sciatica, unspecified laterality       fish oil-omega-3  fatty acids 1000 MG capsule     180 capsule    Take 2 capsules by mouth daily.    Routine general medical examination at a health care facility       hypromellose-dextran 0.3-0.1% opthalmic solution      Apply 1 drop to eye as needed        * PRESERVISION AREDS 2 PO      Take 2 capsules by mouth daily        * MULTIVITAMIN PO      Take 1 tablet by mouth.        PROLIA SC           VITAMIN D (CHOLECALCIFEROL) PO      Take by mouth daily        * Notice:  This list has 2 medication(s) that are the same as other medications prescribed for you. Read the directions carefully, and ask your doctor or other care provider to review them with you.

## 2018-02-20 ENCOUNTER — HOSPITAL ENCOUNTER (OUTPATIENT)
Dept: GENERAL RADIOLOGY | Facility: CLINIC | Age: 68
End: 2018-02-20
Attending: UROLOGY
Payer: MEDICARE

## 2018-02-20 ENCOUNTER — OFFICE VISIT (OUTPATIENT)
Dept: UROLOGY | Facility: CLINIC | Age: 68
End: 2018-02-20
Payer: MEDICARE

## 2018-02-20 ENCOUNTER — HOSPITAL ENCOUNTER (OUTPATIENT)
Dept: ULTRASOUND IMAGING | Facility: CLINIC | Age: 68
Discharge: HOME | End: 2018-02-20
Attending: UROLOGY | Admitting: UROLOGY
Payer: MEDICARE

## 2018-02-20 VITALS
SYSTOLIC BLOOD PRESSURE: 122 MMHG | WEIGHT: 120 LBS | BODY MASS INDEX: 21.26 KG/M2 | HEIGHT: 63 IN | DIASTOLIC BLOOD PRESSURE: 64 MMHG | HEART RATE: 64 BPM

## 2018-02-20 DIAGNOSIS — G35 MULTIPLE SCLEROSIS (H): Primary | ICD-10-CM

## 2018-02-20 DIAGNOSIS — N20.0 KIDNEY STONE: ICD-10-CM

## 2018-02-20 DIAGNOSIS — N31.9 NEUROGENIC BLADDER: ICD-10-CM

## 2018-02-20 DIAGNOSIS — G35 MS (MULTIPLE SCLEROSIS) (H): ICD-10-CM

## 2018-02-20 DIAGNOSIS — G35 MULTIPLE SCLEROSIS (H): ICD-10-CM

## 2018-02-20 LAB
ALBUMIN UR-MCNC: NEGATIVE MG/DL
APPEARANCE UR: CLEAR
BILIRUB UR QL STRIP: NEGATIVE
COLOR UR AUTO: YELLOW
GLUCOSE UR STRIP-MCNC: NEGATIVE MG/DL
HGB UR QL STRIP: NEGATIVE
KETONES UR STRIP-MCNC: NEGATIVE MG/DL
LEUKOCYTE ESTERASE UR QL STRIP: ABNORMAL
NITRATE UR QL: NEGATIVE
PH UR STRIP: 5.5 PH (ref 5–7)
RESIDUAL VOLUME (RV) (EXTERNAL): 0
SOURCE: ABNORMAL
SP GR UR STRIP: 1.01 (ref 1–1.03)
UROBILINOGEN UR STRIP-ACNC: 0.2 EU/DL (ref 0.2–1)

## 2018-02-20 PROCEDURE — 76770 US EXAM ABDO BACK WALL COMP: CPT

## 2018-02-20 PROCEDURE — 81003 URINALYSIS AUTO W/O SCOPE: CPT | Performed by: UROLOGY

## 2018-02-20 PROCEDURE — 74019 RADEX ABDOMEN 2 VIEWS: CPT

## 2018-02-20 PROCEDURE — 51798 US URINE CAPACITY MEASURE: CPT | Performed by: UROLOGY

## 2018-02-20 PROCEDURE — 99214 OFFICE O/P EST MOD 30 MIN: CPT | Mod: 25 | Performed by: UROLOGY

## 2018-02-20 ASSESSMENT — PAIN SCALES - GENERAL: PAINLEVEL: MODERATE PAIN (5)

## 2018-02-20 NOTE — LETTER
Date:February 21, 2018      Patient was self referred, no letter generated. Do not send.        [facility] Health Information

## 2018-02-20 NOTE — PROGRESS NOTES
History: It is a great pleasure to see this very pleasant 67-year-old lady N follow-up consultation today.  She has neurogenic bladder as a result of multiple sclerosis with a long history of MS.  There have been no major exacerbation of MS during the last year, she does have about 2 urinary tract infections during the last year.  She also is noticing quite considerable difficulty emptying the bladder with frequency and mild nocturia with a symptom score of 31, but today the postvoid residual is 0 and the patient states that she is mostly satisfied with the quality of life.  The general medical condition is otherwise stable.    KUB and ultrasound of the kidneys to be performed today.    ULTRASOUND RENAL COMPLETE   2/20/2018 12:39 PM      HISTORY: MS (multiple sclerosis) (H). Kidney stone.     COMPARISON: None.     FINDINGS:  Right kidney measures 10.4 x 4.0 x 3.8 cm. Cortical  thickness measures 1.2 cm AP. There is no hydronephrosis. No renal  calculi. There is an echogenic focus with no shadowing measuring 0.4 x  0.7 cm in the inferior pole the right kidney likely representing an  angiomyolipoma. This is unchanged when compared to prior studies.     Left kidney measures 12.5 x 5.3 x 5.3 cm. Cortical thickness measures  1.3 cm AP. There is mild to moderate hydronephrosis, unchanged. No  renal calculi or solid renal masses are evident.      Limited images of the bladder are unremarkable.          IMPRESSION:   1. Mild to moderate left hydronephrosis, minimally worse when compared  to the prior study.  2. No change in echogenic nonshadowing focus in the inferior pole of  the right kidney, likely representing an angiomyolipoma.     KWADWO STANTON DO    KUB is also being performed.  This does not show any evidence of stone but there is a large amount of gas obscuring the view of the kidneys.  There are no other apparent features.  Full radiology report is still pending.      Past Medical History:   Diagnosis Date  "    Anxiety      Fracture of right lower leg      Macular degeneration, dry     Left Eye     Macular degeneration, wet (H)     Right Eye     Menopausal disorder     age 49     Mitral valve disorders(424.0)      Multiple sclerosis (H)      Optic neuritis 2007     Palpitations      PVD (posterior vitreous detachment), right eye      Unspecified nonsenile cataract     Right eye       Social History     Social History     Marital status:      Spouse name: N/A     Number of children: 7     Years of education: N/A     Occupational History     homemaker      Social History Main Topics     Smoking status: Former Smoker     Packs/day: 1.00     Types: Cigarettes     Quit date: 1/1/1981     Smokeless tobacco: Never Used     Alcohol use No     Drug use: No     Sexual activity: Not Currently     Other Topics Concern      Service No     Blood Transfusions No     IVIG every 6 weeks     Caffeine Concern Yes     7 cups of coffee per day     Occupational Exposure No     Hobby Hazards No     Sleep Concern Yes     MS - night leg spasms     Stress Concern Yes     Weight Concern No     Special Diet Yes     avoids sugar     Back Care Yes     back pain     Exercise Yes     5 times per week - biking     Bike Helmet No     Seat Belt Yes     Self-Exams No     Social History Narrative       Past Surgical History:   Procedure Laterality Date     AVASTIN (BEVACIZUMAB) 1.25MG INTRAVITREAL INJECTION OD (RIGHT EYE) Right     last 7/9/13     HC VITRECTOMY WITH FOCAL ENDOLASER PHOTOCOAGULATION  8/8/2013    \"for glaucoma\" 5 separate times LE     SURGICAL HISTORY OF -   10/05/04    Colonoscopy     SURGICAL HISTORY OF -       Neuroma x 2        Family History   Problem Relation Age of Onset     C.A.D. Mother      CEREBROVASCULAR DISEASE Mother      Macular Degeneration Mother      Rheumatologic Disease Mother      Retinal detachment Mother      C.A.D. Father      DIABETES Father      Prostate Cancer Father      Macular Degeneration Father " "     Glaucoma Father      Thyroid Disease Son      Hypertension No family hx of      Breast Cancer No family hx of      Cancer - colorectal No family hx of          Current Outpatient Prescriptions:      BACLOFEN PO, Take 10 mg by mouth 3 times daily as needed for muscle spasms, Disp: , Rfl:      Denosumab (PROLIA SC), , Disp: , Rfl:      VITAMIN D, CHOLECALCIFEROL, PO, Take by mouth daily, Disp: , Rfl:      cyclobenzaprine (FLEXERIL) 10 MG tablet, Take 1 tablet (10 mg) by mouth nightly as needed for muscle spasms, Disp: 30 tablet, Rfl: 5     aspirin 81 MG tablet, Take 1 tablet by mouth daily, Disp: , Rfl:      BEVACIZUMAB, AVASTIN, INJECTION 2.5MG, , Disp: , Rfl:      Multiple Vitamins-Minerals (PRESERVISION AREDS 2 PO), Take 2 capsules by mouth daily, Disp: , Rfl:      ARTIFICIAL TEARS 0.1-0.3 % SOLN, Apply 1 drop to eye as needed, Disp: , Rfl:      ascorbic acid (VITAMIN C) 1000 MG TABS, Take 1,000 mg by mouth daily, Disp: , Rfl:      fish oil-omega-3 fatty acids (FISH OIL) 1000 MG capsule, Take 2 capsules by mouth daily., Disp: 180 capsule, Rfl: 3     CALCIUM 500 500 MG OR TABS, Take 3 tablets by mouth, Disp: , Rfl:     10 point ROS of systems including Constitutional, Eyes, Respiratory, Cardiovascular, Gastroenterology, Genitourinary, Integumentary, Muscularskeletal, Psychiatric were all negative except for pertinent positives noted in my HPI.    Examination:   /64 (BP Location: Right arm, Patient Position: Chair, Cuff Size: Adult Regular)  Pulse 64  Ht 1.588 m (5' 2.5\")  Wt 54.4 kg (120 lb)  LMP 04/01/1998  BMI 21.6 kg/m2  General Impression: Very pleasant lady in no acute distress, well-oriented in time place and person  Mental Status: Normal.  HEENT.  There is no clinical evidence of jaundice and mucous membranes are normal  Skin: Skin is otherwise normal to examination  Respiratory System: The respiratory cycle is normal  Lymph Nodes: Not examined  Back/Flank Tenderness: There is no flank " tenderness  Cardiovascular System: Not examined  Abdominal Examination: Not examined  Extremities: There is no significant peripheral edema  Genitial: Not examined  Rectal Examination: Not examined  Neurologic System: There is mild visual impairment as a result of macular degeneration.  The fields are otherwise satisfactory without evidence of central scotoma.  There is slight impairment of balance.  There are no other major changes since examination a year ago, there are no other significant abnormal focal neurological signs in the central, or peripheral nervous systems    Impression: She has a long history of multiple sclerosis which is relatively stable at this time.  She is getting quite bothersome urinary symptoms but the postvoid residual is very low.  We did have a discussion today about whether we should consider a trial of self intermittent catheterization with a voiding diary for 4 weeks to observe her progress.  She preferred not to do this at the present time given the ability to completely empty the bladder.  I also suggested to reduce the amount of water she is drinking which is being considerable and this may help with some of the problems is having the frequency and nocturia.  If she develops more urinary tract infections we will consider low-dose prophylactic antibiotic preventative therapy.  We did once again notice some mild hydronephrosis of the lower part of the left kidney although this is virtually unchanged from the previous study one year ago and has serum creatinine, and 0.66 has been very stable for more than 10 years.  However in one year's time I would like to do a CT scan of the abdomen and pelvis with and without contrast define this more accurately.  In addition if her symptoms do get worse and if the postvoid residual thus arise we will give greater emphasis on considering sterile self catheterization.  I discussed the entire situation with her in detail today.  I reviewed all her  "medications.  I reviewed her records in detail  I went over all the pertinent labs and radiologic studies.  I answered many questions    Plan: 1 year for CT abdomen and pelvis with and without contrast, bladder scan, symptoms: Examination    Time: 25 minutes with greater than 50% in discussion consultation, review of records, review of labs and radiologic studies and decision making    \"This dictation was performed with voice recognition software and may contain errors,  omissions and inadvertent word substitution.\"      "

## 2018-02-20 NOTE — MR AVS SNAPSHOT
After Visit Summary   2/20/2018    Sondra Castro    MRN: [id number]           Patient Information     Date Of Birth          1950        Visit Information        Provider Department      2/20/2018 1:00 PM Pee Agee MD [facility]        Today's Diagnoses     Multiple sclerosis (H)        Neurogenic bladder           Follow-ups after your visit        Follow-up notes from your care team     Return in about 1 year (around 2/20/2019) for CT Abdo/Pelvis with/without, Physical Exam, AUA score and bladder scan.      Your next 10 appointments already scheduled     Jun 13, 2018 12:45 PM CDT   RETURN GLAUCOMA with Nanci Briscoe MD   Eye Clinic ([facility])    [facility]  [address] [place]  [address]  [place] [address]   [phone]            Sep 13, 2018 12:30 PM CDT   RETURN RETINA with Kesha Patel MD   Eye Clinic ([facility])    [facility]  [address] [place]  [address]  [place] [address]   [phone]              Future tests that were ordered for you today     Open Future Orders        Priority Expected Expires Ordered    CT Abdomen Pelvis w/o & w Contrast [[id number]] Routine  2/20/2019 2/20/2018            Who to contact     If you have questions or need follow up information about today's clinic visit or your schedule please contact [facility] directly at [phone].  Normal or non-critical lab and imaging results will be communicated to you by MyChart, letter or phone within 4 business days after the clinic has received the results. If you do not hear from us within 7 days, please contact the clinic through MyChart or phone. If you have a critical or abnormal lab result, we will notify you by phone as soon as possible.  Submit refill requests through [employer] or call your pharmacy and they will forward the refill  "request to us. Please allow 3 business days for your refill to be completed.          Additional Information About Your Visit        MyChart Information     [employer] lets you send messages to your doctor, view your test results, renew your prescriptions, schedule appointments and more. To sign up, go to [email or web address] . Click on \"Log in\" on the left side of the screen, which will take you to the Welcome page. Then click on \"Sign up Now\" on the right side of the page.     You will be asked to enter the access code listed below, as well as some personal information. Please follow the directions to create your username and password.     Your access code is: [email or web address]  Expires: 2018  6:30 AM     Your access code will  in 90 days. If you need help or a new code, please call your [facility] clinic or [phone].        Care EveryWhere ID     This is your [facility] EveryWhere ID. This could be used by other organizations to access your [facility] medical records  [id number]        Your Vitals Were     Pulse Height Last Period BMI (Body Mass Index)          64 1.588 m (5' 2.5\") 1998 21.6 kg/m2         Blood Pressure from Last 3 Encounters:   18 122/64   01/08/15 125/61   14 120/66    Weight from Last 3 Encounters:   18 54.4 kg (120 lb)   17 54 kg (119 lb)   01/08/15 54 kg (119 lb)              We Performed the Following     MEASURE POST-VOID RESIDUAL URINE/BLADDER CAPACITY, US NON-IMAGING (55530)     UA without Microscopic [[id number]]        Primary Care Provider Office Phone # Fax #    [facility] [phone] [address]  [facility] [place] [address]        Equal Access to Services     ESTEFANI ISAAC AH: Angel Rosado, wapadilla luqblanca, qaybta kaalmadiann gómez, mary pugh. [facility] [phone].    ATENCIÓN: Si habla español, tiene a panchal disposición servicios gratuitos de asistencia lingüística. " Vivian duarte [phone].    We comply with applicable federal civil rights laws and [place] laws. We do not discriminate on the basis of race, color, national origin, age, disability, sex, sexual orientation, or gender identity.            Thank you!     Thank you for choosing [facility]  for your care. Our goal is always to provide you with excellent care. Hearing back from our patients is one way we can continue to improve our services. Please take a few minutes to complete the written survey that you may receive in the mail after your visit with us. Thank you!             Your Updated Medication List - Protect others around you: Learn how to safely use, store and throw away your medicines at www.disposemymeds.org.          This list is accurate as of 2/20/18  1:51 PM.  Always use your most recent med list.                   Brand Name Dispense Instructions for use Diagnosis    ascorbic acid 1000 MG Tabs    vitamin C     Take 1,000 mg by mouth daily        aspirin 81 MG tablet      Take 1 tablet by mouth daily        BACLOFEN PO      Take 10 mg by mouth 3 times daily as needed for muscle spasms        BEVACIZUMAB (AVASTIN) INJECTION 2.5MG           calcium 500 1250 (500 CA) MG Tabs tablet   Generic drug:  calcium carbonate      Take 3 tablets by mouth        cyclobenzaprine 10 MG tablet    FLEXERIL    30 tablet    Take 1 tablet (10 mg) by mouth nightly as needed for muscle spasms    Sciatica, unspecified laterality       fish oil-omega-3 fatty acids 1000 MG capsule     180 capsule    Take 2 capsules by mouth daily.    Routine general medical examination at a health care facility       hypromellose-dextran 0.3-0.1% opthalmic solution      Apply 1 drop to eye as needed        PRESERVISION AREDS 2 PO      Take 2 capsules by mouth daily        PROLIA SC           VITAMIN D (CHOLECALCIFEROL) PO      Take by mouth daily

## 2018-02-20 NOTE — LETTER
2/20/2018       RE: Sondra Castro  [address] [place] [address]     Dear Colleague,    Thank you for referring your patient, Sondra Castro, to the [facility] [place] at [facility]. Please see a copy of my visit note below.    History: It is a great pleasure to see this very pleasant 67-year-old lady N follow-up consultation today.  She has neurogenic bladder as a result of multiple sclerosis with a long history of MS.  There have been no major exacerbation of MS during the last year, she does have about 2 urinary tract infections during the last year.  She also is noticing quite considerable difficulty emptying the bladder with frequency and mild nocturia with a symptom score of 31, but today the postvoid residual is 0 and the patient states that she is mostly satisfied with the quality of life.  The general medical condition is otherwise stable.    KUB and ultrasound of the kidneys to be performed today.    ULTRASOUND RENAL COMPLETE   2/20/2018 12:39 PM      HISTORY: MS (multiple sclerosis) (H). Kidney stone.     COMPARISON: None.     FINDINGS:  Right kidney measures 10.4 x 4.0 x 3.8 cm. Cortical  thickness measures 1.2 cm AP. There is no hydronephrosis. No renal  calculi. There is an echogenic focus with no shadowing measuring 0.4 x  0.7 cm in the inferior pole the right kidney likely representing an  angiomyolipoma. This is unchanged when compared to prior studies.     Left kidney measures 12.5 x 5.3 x 5.3 cm. Cortical thickness measures  1.3 cm AP. There is mild to moderate hydronephrosis, unchanged. No  renal calculi or solid renal masses are evident.      Limited images of the bladder are unremarkable.          IMPRESSION:   1. Mild to moderate left hydronephrosis, minimally worse when compared  to the prior study.  2. No change in echogenic nonshadowing focus in the inferior pole of  the right kidney, likely representing an  "angiomyolipoma.     KWADWO STANTON DO    KUB is also being performed.  This does not show any evidence of stone but there is a large amount of gas obscuring the view of the kidneys.  There are no other apparent features.  Full radiology report is still pending.      Past Medical History:   Diagnosis Date     Anxiety      Fracture of right lower leg      Macular degeneration, dry     Left Eye     Macular degeneration, wet (H)     Right Eye     Menopausal disorder     age 49     Mitral valve disorders(424.0)      Multiple sclerosis (H)      Optic neuritis 2007     Palpitations      PVD (posterior vitreous detachment), right eye      Unspecified nonsenile cataract     Right eye       Social History     Social History     Marital status:      Spouse name: N/A     Number of children: 7     Years of education: N/A     Occupational History     homemaker      Social History Main Topics     Smoking status: Former Smoker     Packs/day: 1.00     Types: Cigarettes     Quit date: 1/1/1981     Smokeless tobacco: Never Used     Alcohol use No     Drug use: No     Sexual activity: Not Currently     Other Topics Concern      Service No     Blood Transfusions No     IVIG every 6 weeks     Caffeine Concern Yes     7 cups of coffee per day     Occupational Exposure No     Hobby Hazards No     Sleep Concern Yes     MS - night leg spasms     Stress Concern Yes     Weight Concern No     Special Diet Yes     avoids sugar     Back Care Yes     back pain     Exercise Yes     5 times per week - biking     Bike Helmet No     Seat Belt Yes     Self-Exams No     Social History Narrative       Past Surgical History:   Procedure Laterality Date     AVASTIN (BEVACIZUMAB) 1.25MG INTRAVITREAL INJECTION OD (RIGHT EYE) Right     last 7/9/13     HC VITRECTOMY WITH FOCAL ENDOLASER PHOTOCOAGULATION  8/8/2013    \"for glaucoma\" 5 separate times LE     SURGICAL HISTORY OF -   10/05/04    Colonoscopy     SURGICAL HISTORY OF -       Neuroma " "x 2        Family History   Problem Relation Age of Onset     C.A.D. Mother      CEREBROVASCULAR DISEASE Mother      Macular Degeneration Mother      Rheumatologic Disease Mother      Retinal detachment Mother      C.A.D. Father      DIABETES Father      Prostate Cancer Father      Macular Degeneration Father      Glaucoma Father      Thyroid Disease Son      Hypertension No family hx of      Breast Cancer No family hx of      Cancer - colorectal No family hx of          Current Outpatient Prescriptions:      BACLOFEN PO, Take 10 mg by mouth 3 times daily as needed for muscle spasms, Disp: , Rfl:      Denosumab (PROLIA SC), , Disp: , Rfl:      VITAMIN D, CHOLECALCIFEROL, PO, Take by mouth daily, Disp: , Rfl:      cyclobenzaprine (FLEXERIL) 10 MG tablet, Take 1 tablet (10 mg) by mouth nightly as needed for muscle spasms, Disp: 30 tablet, Rfl: 5     aspirin 81 MG tablet, Take 1 tablet by mouth daily, Disp: , Rfl:      BEVACIZUMAB, AVASTIN, INJECTION 2.5MG, , Disp: , Rfl:      Multiple Vitamins-Minerals (PRESERVISION AREDS 2 PO), Take 2 capsules by mouth daily, Disp: , Rfl:      ARTIFICIAL TEARS 0.1-0.3 % SOLN, Apply 1 drop to eye as needed, Disp: , Rfl:      ascorbic acid (VITAMIN C) 1000 MG TABS, Take 1,000 mg by mouth daily, Disp: , Rfl:      fish oil-omega-3 fatty acids (FISH OIL) 1000 MG capsule, Take 2 capsules by mouth daily., Disp: 180 capsule, Rfl: 3     CALCIUM 500 500 MG OR TABS, Take 3 tablets by mouth, Disp: , Rfl:     10 point ROS of systems including Constitutional, Eyes, Respiratory, Cardiovascular, Gastroenterology, Genitourinary, Integumentary, Muscularskeletal, Psychiatric were all negative except for pertinent positives noted in my HPI.    Examination:   /64 (BP Location: Right arm, Patient Position: Chair, Cuff Size: Adult Regular)  Pulse 64  Ht 1.588 m (5' 2.5\")  Wt 54.4 kg (120 lb)  LMP 04/01/1998  BMI 21.6 kg/m2  General Impression: Very pleasant lady in no acute distress, " well-oriented in time place and person  Mental Status: Normal.  HEENT.  There is no clinical evidence of jaundice and mucous membranes are normal  Skin: Skin is otherwise normal to examination  Respiratory System: The respiratory cycle is normal  Lymph Nodes: Not examined  Back/Flank Tenderness: There is no flank tenderness  Cardiovascular System: Not examined  Abdominal Examination: Not examined  Extremities: There is no significant peripheral edema  Genitial: Not examined  Rectal Examination: Not examined  Neurologic System: There is mild visual impairment as a result of macular degeneration.  The fields are otherwise satisfactory without evidence of central scotoma.  There is slight impairment of balance.  There are no other major changes since examination a year ago, there are no other significant abnormal focal neurological signs in the central, or peripheral nervous systems    Impression: She has a long history of multiple sclerosis which is relatively stable at this time.  She is getting quite bothersome urinary symptoms but the postvoid residual is very low.  We did have a discussion today about whether we should consider a trial of self intermittent catheterization with a voiding diary for 4 weeks to observe her progress.  She preferred not to do this at the present time given the ability to completely empty the bladder.  I also suggested to reduce the amount of water she is drinking which is being considerable and this may help with some of the problems is having the frequency and nocturia.  If she develops more urinary tract infections we will consider low-dose prophylactic antibiotic preventative therapy.  We did once again notice some mild hydronephrosis of the lower part of the left kidney although this is virtually unchanged from the previous study one year ago and has serum creatinine, and 0.66 has been very stable for more than 10 years.  However in one year's time I would like to do a CT scan of  "the abdomen and pelvis with and without contrast define this more accurately.  In addition if her symptoms do get worse and if the postvoid residual thus arise we will give greater emphasis on considering sterile self catheterization.  I discussed the entire situation with her in detail today.  I reviewed all her medications.  I reviewed her records in detail  I went over all the pertinent labs and radiologic studies.  I answered many questions    Plan: 1 year for CT abdomen and pelvis with and without contrast, bladder scan, symptoms: Examination    Time: 25 minutes with greater than 50% in discussion consultation, review of records, review of labs and radiologic studies and decision making    \"This dictation was performed with voice recognition software and may contain errors,  omissions and inadvertent word substitution.\"        Again, thank you for allowing me to participate in the care of your patient.      Sincerely,    Pee Agee MD      "

## 2018-06-13 ENCOUNTER — OFFICE VISIT (OUTPATIENT)
Dept: OPHTHALMOLOGY | Facility: CLINIC | Age: 68
End: 2018-06-13
Attending: OPHTHALMOLOGY
Payer: MEDICARE

## 2018-06-13 DIAGNOSIS — H25.13 NUCLEAR SENILE CATARACT OF BOTH EYES: ICD-10-CM

## 2018-06-13 DIAGNOSIS — H40.9 GLAUCOMA: Primary | ICD-10-CM

## 2018-06-13 DIAGNOSIS — H40.003 GLAUCOMA SUSPECT OF BOTH EYES: ICD-10-CM

## 2018-06-13 PROCEDURE — 92133 CPTRZD OPH DX IMG PST SGM ON: CPT | Mod: ZF | Performed by: OPHTHALMOLOGY

## 2018-06-13 PROCEDURE — G0463 HOSPITAL OUTPT CLINIC VISIT: HCPCS | Mod: ZF

## 2018-06-13 PROCEDURE — 92083 EXTENDED VISUAL FIELD XM: CPT | Mod: ZF | Performed by: OPHTHALMOLOGY

## 2018-06-13 RX ORDER — GABAPENTIN 100 MG/1
300 CAPSULE ORAL DAILY
COMMUNITY
Start: 2018-06-12

## 2018-06-13 ASSESSMENT — REFRACTION_WEARINGRX
OD_CYLINDER: +1.00
OD_ADD: +2.75
OS_ADD: +2.75
OS_ADD: +2.50
OD_SPHERE: +3.00
SPECS_TYPE: PAL
OD_CYLINDER: +1.75
OS_CYLINDER: +0.25
OD_ADD: +2.50
OS_SPHERE: +3.00
OD_SPHERE: +3.00
OS_CYLINDER: +0.50
OS_AXIS: 040
OS_SPHERE: +3.75
OS_AXIS: 055
OD_AXIS: 015
OD_AXIS: 178

## 2018-06-13 ASSESSMENT — EXTERNAL EXAM - RIGHT EYE: OD_EXAM: NORMAL

## 2018-06-13 ASSESSMENT — SLIT LAMP EXAM - LIDS
COMMENTS: NORMAL
COMMENTS: NORMAL

## 2018-06-13 ASSESSMENT — CONF VISUAL FIELD
METHOD: COUNTING FINGERS
OS_NORMAL: 1
OD_NORMAL: 1

## 2018-06-13 ASSESSMENT — EXTERNAL EXAM - LEFT EYE: OS_EXAM: NORMAL

## 2018-06-13 ASSESSMENT — TONOMETRY
OS_IOP_MMHG: 14
OD_IOP_MMHG: 12
IOP_METHOD: APPLANATION

## 2018-06-13 ASSESSMENT — VISUAL ACUITY
CORRECTION_TYPE: GLASSES
OD_PH_CC: 20/25
OS_CC+: -2
METHOD: SNELLEN - LINEAR
OD_CC: 20/30
OD_CC+: +2
OS_CC: 20/30

## 2018-06-13 ASSESSMENT — CUP TO DISC RATIO
OS_RATIO: 0.4
OD_RATIO: 0.3

## 2018-06-13 NOTE — NURSING NOTE
Chief Complaints and History of Present Illnesses   Patient presents with     Follow Up For     1 year follow up  Primary open angle glaucoma (POAG) suspect with mild cup:disc asymmetry     HPI    Affected eye(s):  Both   Symptoms:     No floaters   No redness   No tearing   Dryness (Comment: relief with drops)   No itching         Do you have eye pain now?:  No      Comments:  Pt states vision is about the same as last visit. No eye pain today.    Epifanio VARMA June 13, 2018 1:01 PM

## 2018-06-13 NOTE — PROGRESS NOTES
History of suspect glaucoma s/p SLT (or ALT) both eyes, presenting for follow up.    HPI:   Seeing well for activities of daily living     Current Medications:   None    Octopus LVC left eye Similar to previous mena visual field (HVF) size V with some scatter but fairly normal    OCT retinal nerve fiber layer stable both eyes    Right eye normal   Left eye thin ST as before    Primary open angle glaucoma (POAG) suspect with mild cup:disc asymmetry   LVC full both eyes     Scatter on visual field left eye but also has diagnosis of optic neuritis left eye   OCT stable both eyes    Not on glaucoma drops currently   S/p Selected laser trabeculoplasty (SLT) or Argon laser trabeculoplasty both eyes     Narrow angle right eye with plateau configuation, stable   - Consider laser peripheral iridotomy (LPI) if progressive    Optic neuritis left eye - history of multiple sclerosis     Migraine aura without headache    Now with brief aura (few seconds) fairly frequently   Seen by Dr Hernandez in past    Age related macular degeneration    Seen by Dr. Patel   History of Wet Age related macular degeneration right eye; Dry left eye      Avastin x 4 right eye (most recent 7/9/2013)    Senile nuclear sclerosis both eyes    Becoming symptomatic   Could have cataract extraction when desired   Discussed toric (perhaps for right eye) and multifocal (would not recommend)   Would do BAT testing if cataract extraction desired    Plan:   Return to clinic 1 year with [facility] visual field both eyes and OCT retinal nerve fiber layer and dilation    Attending Physician Attestation:  Complete documentation of historical and exam elements from today's encounter can be found in the full encounter summary report (not reduplicated in this progress note). I personally obtained the chief complaint(s) and history of present illness. I confirmed and edited asnecessary the review of systems, past medical/surgical history, family history, social history,  and examination findings as documented by others; and I examined the patient myself. I personally reviewed the relevant tests, images, and reports as documented above. I formulated and edited as necessary the assessment and plan and discussed the findings and management plan with the patient and family.  - Nanci Briscoe MD 2:01 PM 6/13/2018       Addendum   Patient called to schedule cataract extraction.  Would need to do both eyes within a few weeks of each other due to potential for anisometropia.  Recommend standard intraocular lens given Age related macular degeneration.  Return for BAT and intraocular lens calculations.  Patient may choose which eye to do first    Attending Physician Attestation:  Complete documentation of historical and exam elements from today's encounter can be found in the full encounter summary report (not reduplicated in this progress note). I personally obtained the chief complaint(s) and history of present illness. I confirmed and edited asnecessary the review of systems, past medical/surgical history, family history, social history, and examination findings as documented by others; and I examined the patient myself. I personally reviewed the relevant tests, images, and reports as documented above. I formulated and edited as necessary the assessment and plan and discussed the findings and management plan with the patient and family.  - Nanci Briscoe MD 10:21 AM 7/10/2018

## 2018-06-13 NOTE — MR AVS SNAPSHOT
After Visit Summary   6/13/2018    Sondra Castro    MRN: [id number]           Patient Information     Date Of Birth          1950        Visit Information        Provider Department      6/13/2018 12:45 PM Nanci Briscoe MD [facility]        Today's Diagnoses     Glaucoma    -  1    Glaucoma suspect of both eyes        Nuclear senile cataract of both eyes           Follow-ups after your visit        Follow-up notes from your care team     Return in about 1 year (around 6/13/2019) for visual field LVC, OCT rnfl, dilation.      Your next 10 appointments already scheduled     Aug 27, 2018  2:00 PM CDT   TECH with [facility] EYE TECH   Eye Clinic ([facility])    [facility]  [address]   [address] Clin 9a  [facility] [address]   [phone]            Sep 12, 2018  1:15 PM CDT   Post-Op with Nanci Briscoe MD   Eye Clinic ([facility])    [facility]  [address] [place] [address] [address]   [address] Clin 9a  [facility] [address]   [phone]            Sep 13, 2018 12:30 PM CDT   RETURN RETINA with Kesha Patel MD   Eye Clinic ([facility])    [facility]  [address]   [address] Clin 9a  [facility] [address]   [phone]            Sep 19, 2018 12:30 PM CDT   Post-Op with Nanci Briscoe MD   Eye Clinic ([facility])    [facility]  [address] [place] [address] St   [address] Clin 9a  [facility] [address]   [phone]            Oct 03, 2018 12:30 PM CDT   Post-Op with Nanci Briscoe MD   Eye Clinic ([facility])    [facility]  [address] [place] [address] [address]   [address]th [address] Clin 9a  [facility] [address]   [phone]            Oct 10, 2018 12:30 PM CDT   Post-Op with Nanci Briscoe MD   Eye Clinic ([facility])    [facility]  [address] [place] [address] [address]   [address] Clin 9a  [facility] [address]   [phone]            Oct 17, 2018 12:30 PM CDT   Post-Op with Nanci Briscoe MD    Eye Clinic ([facility])    [address] [address]   [phone]            Oct 31, 2018 12:30 PM CDT   Post-Op with Nanci Briscoe MD   Eye Clinic ([facility])    [address] [address]   [phone]            2018 12:30 PM CST   Post-Op with Nanci Briscoe MD   Eye Clinic ([facility])    [address] [address]   [phone]            2019  1:00 PM CDT   RETURN GLAUCOMA with Nanci Briscoe MD   Eye Clinic ([facility])    [address] [address]   [phone]              Who to contact     Please call your clinic at [phone] to:    Ask questions about your health    Make or cancel appointments    Discuss your medicines    Learn about your test results    Speak to your doctor            Additional Information About Your Visit        [employer] Information     [employer] is an electronic gateway that provides easy, online access to your medical records. With [employer], you can request a clinic appointment, read your test results, renew a prescription or communicate with your care team.     To sign up for [employer] visit the website at [email or web address]   You will be asked to enter the access code listed below, as well as some personal information. Please follow the directions to create your username and password.     Your access code is: [email or web address]  Expires: 2018  6:30 AM     Your access code will  in 90 days. If you need help or a new code, please contact your [facility] or call [phone] for assistance.        Care EveryWhere ID     This is your Care EveryWhere ID. This could be used by other organizations to access your [facility]  records  [id number]        Your Vitals Were     Last Period                   04/01/1998            Blood Pressure from Last 3 Encounters:   02/20/18 122/64   01/08/15 125/61   12/29/14 120/66    Weight from Last 3 Encounters:   02/20/18 54.4 kg (120 lb)   02/16/17 54 kg (119 lb)   01/08/15 54 kg (119 lb)              We Performed the Following     Low Vision Central OU     OCT Optic Nerve RNFL Spectralis OU (both eyes)     Hien-Operative Worksheet (Glaucoma)        Primary Care Provider Office Phone # Fax #    [facility] [phone] [address] [facility]. [address]  [facility] [address]        Equal Access to Services     ESTEFANI ISAAC : Angel pano Sodeyanira, waaxda luqadaha, qaybta kaalmada adeegyada, mary pugh. So [place] [phone].    ATENCIÓN: Si habla español, tiene a panchal disposición servicios gratuitos de asistencia lingüística. [facility] [phone].    We comply with applicable federal civil rights laws and [place] laws. We do not discriminate on the basis of race, color, national origin, age, disability, sex, sexual orientation, or gender identity.            Thank you!     Thank you for choosing EYE CLINIC  for your care. Our goal is always to provide you with excellent care. Hearing back from our patients is one way we can continue to improve our services. Please take a few minutes to complete the written survey that you may receive in the mail after your visit with us. Thank you!             Your Updated Medication List - Protect others around you: Learn how to safely use, store and throw away your medicines at www.disposemymeds.org.          This list is accurate as of 6/13/18 11:59 PM.  Always use your most recent med list.                   Brand Name Dispense Instructions for use Diagnosis    ascorbic acid 1000 MG Tabs    vitamin C     Take 1,000 mg by mouth daily        aspirin 81 MG tablet      Take 1 tablet by mouth daily         BACLOFEN PO      Take 10 mg by mouth 3 times daily as needed for muscle spasms        BEVACIZUMAB (AVASTIN) INJECTION 2.5MG           calcium 500 1250 (500 Ca) MG Tabs tablet   Generic drug:  calcium carbonate      Take 3 tablets by mouth        cyclobenzaprine 10 MG tablet    FLEXERIL    30 tablet    Take 1 tablet (10 mg) by mouth nightly as needed for muscle spasms    Sciatica, unspecified laterality       fish oil-omega-3 fatty acids 1000 MG capsule     180 capsule    Take 2 capsules by mouth daily.    Routine general medical examination at a health care facility       gabapentin 100 MG capsule    NEURONTIN     300 mg daily Pt to increase dosage starting at 100mg        hypromellose-dextran 0.3-0.1% 0.1-0.3 % Soln ophthalmic solution   Generic drug:  hypromellose-dextran      Apply 1 drop to eye as needed        PRESERVISION AREDS 2 PO      Take 2 capsules by mouth daily        PROLIA SC           VITAMIN D (CHOLECALCIFEROL) PO      Take by mouth daily

## 2018-07-11 ENCOUNTER — TELEPHONE (OUTPATIENT)
Dept: OPHTHALMOLOGY | Facility: CLINIC | Age: 68
End: 2018-07-11

## 2018-07-12 ENCOUNTER — TELEPHONE (OUTPATIENT)
Dept: OPHTHALMOLOGY | Facility: CLINIC | Age: 68
End: 2018-07-12

## 2018-08-27 ENCOUNTER — ALLIED HEALTH/NURSE VISIT (OUTPATIENT)
Dept: OPHTHALMOLOGY | Facility: CLINIC | Age: 68
End: 2018-08-27
Attending: OPHTHALMOLOGY
Payer: MEDICARE

## 2018-08-27 DIAGNOSIS — H25.13 NUCLEAR SENILE CATARACT OF BOTH EYES: Primary | ICD-10-CM

## 2018-08-27 PROCEDURE — 40000269 ZZH STATISTIC NO CHARGE FACILITY FEE: Mod: ZF

## 2018-08-27 ASSESSMENT — VISUAL ACUITY
METHOD: SNELLEN - LINEAR
CORRECTION_TYPE: GLASSES
OD_BAT_HIGH: 20/70
OD_BAT_MED: 20/60
OS_BAT_LOW: 20/50
OS_BAT_MED: 20/60
OS_BAT_HIGH: 20/80
OS_CC: 20/40
OD_BAT_LOW: 20/50
OS_PH_CC: 20/30-2
OD_CC: 20/40
OD_PH_CC: 20/30-2
OS_CC+: -2
OD_CC+: -1

## 2018-08-27 ASSESSMENT — PACHYMETRY
OS_CT(UM): 539
OD_CT(UM): 562

## 2018-08-27 NOTE — NURSING NOTE
Chief Complaints and History of Present Illnesses   Patient presents with     Follow Up For     IOL calcs and BAT     HPI    Symptoms:           Do you have eye pain now?:  No      Comments:  Pt here for IOL calcs and BAT    Roseanna Tran@ [facility] 2:27 PM August 27, 2018

## 2018-08-27 NOTE — MR AVS SNAPSHOT
After Visit Summary   8/27/2018    Sondra Castro    MRN: [id number]           Patient Information     Date Of Birth          1950        Visit Information        Provider Department      8/27/2018 2:00 PM [facility]        Today's Diagnoses     Nuclear senile cataract of both eyes    -  1       Follow-ups after your visit        Your next 10 appointments already scheduled     Sep 12, 2018  1:15 PM CDT   Post-Op with Nanci Briscoe MD   Eye Clinic ([facility])    [facility]  [address] [place] [address] [address] Clin [address]a  [facility] [address]   [phone]            Sep 17, 2018  2:15 PM CDT   Post-Op with Nanci Briscoe MD   Eye Clinic ([facility])    [facility]  [address] [place] [address]   [phone]            Sep 27, 2018  1:00 PM CDT   RETURN RETINA with Kesha Patel MD   Eye Clinic ([facility])    [facility]  [address] [place] [address] [address]   [address] [address]   [phone]            Oct 01, 2018  2:15 PM CDT   Post-Op with Nanci Briscoe MD   Eye Clinic ([facility])    [facility]  [address] [place] [address] [address] [address]   [phone]            Oct 10, 2018 12:30 PM CDT   Post-Op with Nanci Briscoe MD   Eye Clinic ([facility])    [facility]  [address] [place] [address] [address]   [address] [address]   [phone]            Oct 15, 2018  2:15 PM CDT   Post-Op with Nanci Briscoe MD   Eye Clinic ([facility])    [facility]  [address] [place] [address] St   [address] Clin 9a  [facility] [address]   [phone]            Nov 02, 2018 10:15 AM CDT   Post-Op with Nanci Briscoe MD   Eye Clinic ([facility])    [facility]  [address] [place] [address] St   [address] Clin 9a  [facility] [address]   [phone]            Nov 12, 2018  2:15 PM CST    Post-Op with Nanci Briscoe MD   Eye Clinic ([facility])    [address]  [address] Clin 9a  [facility] [address] [phone]            2019  1:00 PM CDT   RETURN GLAUCOMA with Nanci Briscoe MD   Eye Clinic ([facility])    [address]  [address] [address]   [phone]              Who to contact     Please call your clinic at [phone] to:    Ask questions about your health    Make or cancel appointments    Discuss your medicines    Learn about your test results    Speak to your doctor            Additional Information About Your Visit        VOY[employer]hart Information     [employer] is an electronic gateway that provides easy, online access to your medical records. With [employer], you can request a clinic appointment, read your test results, renew a prescription or communicate with your care team.     To sign up for [employer] Messenger visit the website at [email or web address]   You will be asked to enter the access code listed below, as well as some personal information. Please follow the directions to create your username and password.     Your access code is: [email or web address]  Expires: 2018  6:31 AM     Your access code will  in 90 days. If you need help or a new code, please contact your [facility] or call [phone] for assistance.        Care EveryWhere ID     This is your Care EveryWhere ID. This could be used by other organizations to access your [facility] medical records  [id number]        Your Vitals Were     Last Period                   1998            Blood Pressure from Last 3 Encounters:   18 122/64   01/08/15 125/61   14 120/66    Weight from Last 3 Encounters:   18 54.4 kg (120 lb)   17 54 kg (119 lb)   01/08/15 54 kg (119 lb)              Today, you had the following     No orders found for display       Primary  Care Provider Office Phone # Fax #    [facility] [phone] [address] [facility]  [facility] [address]        Equal Access to Services     ESTEFANI ISAAC : Hadii aad ku hadmikeo Sodeyanira, waaxda luqadaha, qaybta kaalmada ademariama, mary ascencio laCarlotajosey pugh. So [facility] [phone].    ATENCIÓN: Si habla español, tiene a panchal disposición servicios gratuitos de asistencia lingüística. Llame al [phone].    We comply with applicable federal civil rights laws and [place] laws. We do not discriminate on the basis of race, color, national origin, age, disability, sex, sexual orientation, or gender identity.            Thank you!     Thank you for choosing EYE CLINIC  for your care. Our goal is always to provide you with excellent care. Hearing back from our patients is one way we can continue to improve our services. Please take a few minutes to complete the written survey that you may receive in the mail after your visit with us. Thank you!             Your Updated Medication List - Protect others around you: Learn how to safely use, store and throw away your medicines at www.disposemymeds.org.          This list is accurate as of 8/27/18 11:59 PM.  Always use your most recent med list.                   Brand Name Dispense Instructions for use Diagnosis    ascorbic acid 1000 MG Tabs    vitamin C     Take 1,000 mg by mouth daily        aspirin 81 MG tablet      Take 1 tablet by mouth daily        BACLOFEN PO      Take 10 mg by mouth 3 times daily as needed for muscle spasms        BEVACIZUMAB (AVASTIN) INJECTION 2.5MG           calcium 500 1250 (500 Ca) MG Tabs tablet   Generic drug:  calcium carbonate 500 mg {elemental}      Take 3 tablets by mouth        cyclobenzaprine 10 MG tablet    FLEXERIL    30 tablet    Take 1 tablet (10 mg) by mouth nightly as needed for muscle spasms    Sciatica, unspecified laterality       fish oil-omega-3 fatty acids 1000 MG capsule      180 capsule    Take 2 capsules by mouth daily.    Routine general medical examination at a health care facility       gabapentin 100 MG capsule    NEURONTIN     300 mg daily Pt to increase dosage starting at 100mg        hypromellose-dextran 0.3-0.1% 0.1-0.3 % Soln ophthalmic solution   Generic drug:  hypromellose-dextran      Apply 1 drop to eye as needed        PRESERVISION AREDS 2 PO      Take 2 capsules by mouth daily        PROLIA SC           VITAMIN D (CHOLECALCIFEROL) PO      Take by mouth daily

## 2018-09-11 ENCOUNTER — TRANSFERRED RECORDS (OUTPATIENT)
Dept: HEALTH INFORMATION MANAGEMENT | Facility: CLINIC | Age: 68
End: 2018-09-11

## 2018-09-12 ENCOUNTER — OFFICE VISIT (OUTPATIENT)
Dept: OPHTHALMOLOGY | Facility: CLINIC | Age: 68
End: 2018-09-12
Attending: OPHTHALMOLOGY
Payer: MEDICARE

## 2018-09-12 DIAGNOSIS — Z48.810 AFTERCARE FOLLOWING SURGERY OF A SENSORY ORGAN: Primary | ICD-10-CM

## 2018-09-12 PROCEDURE — G0463 HOSPITAL OUTPT CLINIC VISIT: HCPCS | Mod: ZF

## 2018-09-12 ASSESSMENT — CUP TO DISC RATIO
OD_RATIO: 0.3
OS_RATIO: 0.4

## 2018-09-12 ASSESSMENT — EXTERNAL EXAM - LEFT EYE: OS_EXAM: NORMAL

## 2018-09-12 ASSESSMENT — TONOMETRY
OD_IOP_MMHG: 18
OS_IOP_MMHG: 19
IOP_METHOD: ICARE

## 2018-09-12 ASSESSMENT — VISUAL ACUITY
OD_SC: 20/30
OD_SC+: +2
OS_CC+: +1
OD_PH_SC: 20/20-2
METHOD: SNELLEN - LINEAR
OS_CC: 20/25

## 2018-09-12 ASSESSMENT — SLIT LAMP EXAM - LIDS
COMMENTS: NORMAL
COMMENTS: NORMAL

## 2018-09-12 ASSESSMENT — EXTERNAL EXAM - RIGHT EYE: OD_EXAM: NORMAL

## 2018-09-12 NOTE — NURSING NOTE
Chief Complaints and History of Present Illnesses   Patient presents with     Post Op (Ophthalmology) Right Eye     cataract extraction IOL implant     HPI    Affected eye(s):  Right   Symptoms:     No floaters   No flashes   No redness   No tearing      Duration:  1 day   Frequency:  Constant       Do you have eye pain now?:  No      Comments:  Patient presents for 1 day s/p cataract removal w/IOL implant right eye. Things went fine with the surgery aside the anesthesia she was given made her sick. The RE feels sore. No redness or tears. She started her prescribed eye drops last night. Slept with the Copious eye shield, presents wearing today. She can see a round halo of the lens like a disc. The vision seems to have improved at distance. No flashes or floaters. Gemma South COT 1:06 PM September 12, 2018

## 2018-09-12 NOTE — PROGRESS NOTES
Postoperative day #1 cataract extraction with intraocular lens 9/11/18 right eye   History of suspect glaucoma s/p SLT (or ALT) both eyes, presenting for follow up.    HPI:   Seeing well for activities of daily living     Current Medications:   None    Octopus LVC left eye Similar to previous mena visual field (HVF) size V with some scatter but fairly normal    OCT retinal nerve fiber layer stable both eyes    Right eye normal   Left eye thin ST as before    Primary open angle glaucoma (POAG) suspect with mild cup:disc asymmetry   LVC full both eyes     Scatter on visual field left eye but also has diagnosis of optic neuritis left eye   OCT stable both eyes    Not on glaucoma drops currently   S/p Selected laser trabeculoplasty (SLT) or Argon laser trabeculoplasty both eyes     Narrow angle right eye with plateau configuation, stable   - Consider laser peripheral iridotomy (LPI) if progressive    Optic neuritis left eye - history of multiple sclerosis     Migraine aura without headache    Now with brief aura (few seconds) fairly frequently   Seen by Dr Hernandez in past    Age related macular degeneration    Seen by Dr. Patel   History of Wet Age related macular degeneration right eye; Dry left eye      Avastin x 4 right eye (most recent 7/9/2013)    Senile nuclear sclerosis left eye     Becoming symptomatic   BAT 20/80 on high    Plan:   Postoperative instructions and sheet given including no bending, no lifting more than 10 pounds  Use prednisolone acetate 1%  four times a day for 1 week, three times a day for 1 week, twice a day for 1 week, once a day for 1 week, then stop.  Ketorolac four times a day for 2 weeks, then stop    Severe nausea after cataract extraction right eye (topical) Prefers surgery left eye topical with NO IV SEDATION    Attending Physician Attestation:  Complete documentation of historical and exam elements from today's encounter can be found in the full encounter summary report (not  reduplicated in this progress note). I personally obtained the chief complaint(s) and history of present illness. I confirmed and edited asnecessary the review of systems, past medical/surgical history, family history, social history, and examination findings as documented by others; and I examined the patient myself. I personally reviewed the relevant tests, images, and reports as documented above. I formulated and edited as necessary the assessment and plan and discussed the findings and management plan with the patient and family.  - Nanci Briscoe MD 2:29 PM 9/12/2018

## 2018-09-12 NOTE — MR AVS SNAPSHOT
After Visit Summary   9/12/2018    Sondra Castro    MRN: [id number]           Patient Information     Date Of Birth          1950        Visit Information        Provider Department      9/12/2018 1:15 PM Nanci Briscoe MD [facility]        Today's Diagnoses     Aftercare following surgery of a sensory organ    -  1       Follow-ups after your visit        Your next 10 appointments already scheduled     Sep 17, 2018  2:15 PM CDT   Post-Op with Nanci Briscoe MD   Eye Clinic ([facility])    [facility]  [address] [place] [address] St   9[facility] Clin [address] [place] [address]   [phone]            Sep 27, 2018  1:00 PM CDT   RETURN RETINA with Kesha Patel MD   Eye Clinic ([facility])    [facility]  [address] [place] [address] St   [address] [address]   [phone]            Oct 01, 2018  2:15 PM CDT   Post-Op with Nanci Briscoe MD   Eye Clinic ([facility])    [facility]  [address] [place] [address] St   9[facility] Clin [address] [place] [address]   [phone]            Oct 10, 2018 12:30 PM CDT   Post-Op with Nanci Briscoe MD   Eye Clinic ([facility])    [facility]  [address] [place] [address] St   [address] [address]   [phone]            Oct 15, 2018  2:15 PM CDT   Post-Op with Nanci Briscoe MD   Eye Clinic ([facility])    [facility]  [address] [place] [address] St   [address] [address]   [phone]            Nov 02, 2018 10:15 AM CDT   Post-Op with Nanci Briscoe MD   Eye Clinic ([facility])    [facility]  [address] [place] [address] St   9[facility] Clin [address] [place] [address]   [phone]            Nov 12, 2018  2:15 PM CST   Post-Op with Nanci Briscoe MD   Eye Clinic ([facility])    [facility]  [address] [place] [address] St   9[facility] Clin [address] [place] [address]   [phone]            Jun 17,    1:00 PM CDT   RETURN GLAUCOMA with Nanci Briscoe MD   Eye Clinic ([facility])    [address]  9 [address] Clin 9a  [facility] [address]   [phone]              Who to contact     Please call your clinic at [phone] to:    Ask questions about your health    Make or cancel appointments    Discuss your medicines    Learn about your test results    Speak to your doctor            Additional Information About Your Visit        MyChart Information     [employer] is an electronic gateway that provides easy, online access to your medical records. With [employer], you can request a clinic appointment, read your test results, renew a prescription or communicate with your care team.     To sign up for [employer] visit the website at [email or web address]   You will be asked to enter the access code listed below, as well as some personal information. Please follow the directions to create your username and password.     Your access code is: [email or web address]  Expires: 2018  6:31 AM     Your access code will  in 90 days. If you need help or a new code, please contact your [facility] or call [phone] for assistance.        Care EveryWhere ID     This is your Care EveryWhere ID. This could be used by other organizations to access your [place] medical records  [id number]        Your Vitals Were     Last Period                   1998            Blood Pressure from Last 3 Encounters:   18 122/64   01/08/15 125/61   14 120/66    Weight from Last 3 Encounters:   18 54.4 kg (120 lb)   17 54 kg (119 lb)   01/08/15 54 kg (119 lb)              Today, you had the following     No orders found for display       Primary Care Provider Office Phone # Fax #    [facility]ah [facility] [phone]       [facility] [facility]. [address]  [facility] [address]        Equal Access to Services     ESTEFANI ISAAC AH:  Hadii aad ku hadmikegerda Sairadeyanira, wajimda luqadaha, qaybta kajean marie gómez, mary kayceein hayaajer joynerash ascencio larenettajer keaton. So [facility] [phone].    ATENCIÓN: Si don bauer, tiene a panchal disposición servicios gratuitos de asistencia lingüística. Llame al [phone].    We comply with applicable federal civil rights laws and [place] laws. We do not discriminate on the basis of race, color, national origin, age, disability, sex, sexual orientation, or gender identity.            Thank you!     Thank you for choosing EYE CLINIC  for your care. Our goal is always to provide you with excellent care. Hearing back from our patients is one way we can continue to improve our services. Please take a few minutes to complete the written survey that you may receive in the mail after your visit with us. Thank you!             Your Updated Medication List - Protect others around you: Learn how to safely use, store and throw away your medicines at www.disposemymeds.org.          This list is accurate as of 9/12/18  2:38 PM.  Always use your most recent med list.                   Brand Name Dispense Instructions for use Diagnosis    ascorbic acid 1000 MG Tabs    vitamin C     Take 1,000 mg by mouth daily        aspirin 81 MG tablet      Take 1 tablet by mouth daily        BACLOFEN PO      Take 10 mg by mouth 3 times daily as needed for muscle spasms        BEVACIZUMAB (AVASTIN) INJECTION 2.5MG           calcium 500 1250 (500 Ca) MG Tabs tablet   Generic drug:  calcium carbonate 500 mg {elemental}      Take 3 tablets by mouth        cyclobenzaprine 10 MG tablet    FLEXERIL    30 tablet    Take 1 tablet (10 mg) by mouth nightly as needed for muscle spasms    Sciatica, unspecified laterality       fish oil-omega-3 fatty acids 1000 MG capsule     180 capsule    Take 2 capsules by mouth daily.    Routine general medical examination at a health care facility       gabapentin 100 MG capsule    NEURONTIN     300 mg daily Pt to increase  dosage starting at 100mg        hypromellose-dextran 0.3-0.1% 0.1-0.3 % Soln ophthalmic solution   Generic drug:  hypromellose-dextran      Apply 1 drop to eye as needed        PRESERVISION AREDS 2 PO      Take 2 capsules by mouth daily        PROLIA SC           VITAMIN D (CHOLECALCIFEROL) PO      Take by mouth daily

## 2018-09-17 ENCOUNTER — OFFICE VISIT (OUTPATIENT)
Dept: OPHTHALMOLOGY | Facility: CLINIC | Age: 68
End: 2018-09-17
Attending: OPHTHALMOLOGY
Payer: MEDICARE

## 2018-09-17 DIAGNOSIS — Z48.810 AFTERCARE FOLLOWING SURGERY OF A SENSORY ORGAN: Primary | ICD-10-CM

## 2018-09-17 PROCEDURE — G0463 HOSPITAL OUTPT CLINIC VISIT: HCPCS | Mod: ZF

## 2018-09-17 ASSESSMENT — REFRACTION_WEARINGRX
OD_SPHERE: +3.00
SPECS_TYPE: PAL
OD_AXIS: 178
OS_SPHERE: +3.75
OD_CYLINDER: +1.75
OS_AXIS: 055
OD_ADD: +2.50
OS_ADD: +2.50
OS_CYLINDER: +0.25

## 2018-09-17 ASSESSMENT — SLIT LAMP EXAM - LIDS
COMMENTS: NORMAL
COMMENTS: NORMAL

## 2018-09-17 ASSESSMENT — VISUAL ACUITY
METHOD: SNELLEN - LINEAR
OS_CC: 20/30
CORRECTION_TYPE: GLASSES
OD_SC: 20/30

## 2018-09-17 ASSESSMENT — EXTERNAL EXAM - LEFT EYE: OS_EXAM: NORMAL

## 2018-09-17 ASSESSMENT — TONOMETRY
OD_IOP_MMHG: 15
OS_IOP_MMHG: 13
IOP_METHOD: TONOPEN

## 2018-09-17 ASSESSMENT — EXTERNAL EXAM - RIGHT EYE: OD_EXAM: NORMAL

## 2018-09-17 ASSESSMENT — CUP TO DISC RATIO
OD_RATIO: 0.3
OS_RATIO: 0.4

## 2018-09-17 ASSESSMENT — CONF VISUAL FIELD
OD_NORMAL: 1
OS_NORMAL: 1
METHOD: COUNTING FINGERS

## 2018-09-17 NOTE — NURSING NOTE
Chief Complaints and History of Present Illnesses   Patient presents with     Post Op (Ophthalmology) Right Eye     ceiol     HPI    Affected eye(s):  Right   Symptoms:        Frequency:  Constant       Do you have eye pain now?:  No      Comments:  va is better but harder at near  No red watery  +dry  Ember Gunter COT 2:25 PM September 17, 2018

## 2018-09-17 NOTE — PROGRESS NOTES
Postoperative week #1 cataract extraction with intraocular lens 9/11/18 right eye   History of suspect glaucoma s/p SLT (or ALT) both eyes, presenting for follow up.    HPI:   Seeing well for activities of daily living     Current Medications:   None    Octopus LVC left eye Similar to previous mena visual field (HVF) size V with some scatter but fairly normal    OCT retinal nerve fiber layer stable both eyes    Right eye normal   Left eye thin ST as before    Primary open angle glaucoma (POAG) suspect with mild cup:disc asymmetry   LVC full both eyes     Scatter on visual field left eye but also has diagnosis of optic neuritis left eye   OCT stable both eyes    Not on glaucoma drops currently   S/p Selected laser trabeculoplasty (SLT) or Argon laser trabeculoplasty both eyes     Narrow angle right eye with plateau configuation, stable   - Consider laser peripheral iridotomy (LPI) if progressive    Optic neuritis left eye - history of multiple sclerosis     Migraine aura without headache    Now with brief aura (few seconds) fairly frequently   Seen by Dr Hernandez in past    Age related macular degeneration    Seen by Dr. Patel   History of Wet Age related macular degeneration right eye; Dry left eye      Avastin x 4 right eye (most recent 7/9/2013)    Senile nuclear sclerosis left eye     Becoming symptomatic   BAT 20/80 on high    Plan:   Use prednisolone acetate 1%   three times a day for 1 week, twice a day for 1 week, once a day for 1 week, then stop.  Ketorolac four times a day for 2 weeks, then stop    Severe nausea after cataract extraction right eye (topical) Prefers surgery left eye topical with NO IV SEDATION for surgery in October    Attending Physician Attestation:  Complete documentation of historical and exam elements from today's encounter can be found in the full encounter summary report (not reduplicated in this progress note). I personally obtained the chief complaint(s) and history of present  illness. I confirmed and edited asnecessary the review of systems, past medical/surgical history, family history, social history, and examination findings as documented by others; and I examined the patient myself. I personally reviewed the relevant tests, images, and reports as documented above. I formulated and edited as necessary the assessment and plan and discussed the findings and management plan with the patient and family.  - Nanci Briscoe MD 2:35 PM 9/17/2018

## 2018-09-17 NOTE — MR AVS SNAPSHOT
After Visit Summary   9/17/2018    Sondra Castro    MRN: [id number]           Patient Information     Date Of Birth          1950        Visit Information        Provider Department      9/17/2018 2:15 PM [facility]i Briscoe MD [facility]        Today's Diagnoses     Aftercare following surgery of a sensory organ    -  1       Follow-ups after your visit        Your next 10 appointments already scheduled     Sep 27, 2018  1:00 PM CDT   RETURN RETINA with Kesha Patel MD   Eye Clinic ([facility])    [facility]  [address]   [address] Clin [address] [place] [address]   [phone]            Oct 01, 2018  2:15 PM CDT   Post-Op with Nanci Briscoe MD   Eye Clinic ([facility])    [facility]  [address] [place] [address] St   [address] [address]   [phone]            Oct 10, 2018 12:30 PM CDT   Post-Op with Nanci Briscoe MD   Eye Clinic ([facility])    [facility]  [address] [place] [address] St   [address] [address]   [phone]            Oct 15, 2018  2:15 PM CDT   Post-Op with Nanci Briscoe MD   Eye Clinic ([facility])    [facility]  [address] [place] [address] St [address] [address]   [phone]            Nov 02, 2018 10:15 AM CDT   Post-Op with Nanci Briscoe MD   Eye Clinic ([facility])    [facility]  [address] [place] [address] St   [address] [address]   [phone]            Nov 12, 2018  2:15 PM CST   Post-Op with Nanci Briscoe MD   Eye Clinic ([facility])    [facility]  [address] [place] [address] St   9[facility] Clin [address] [place] [address]   [phone]            Jun 17, 2019  1:00 PM CDT   RETURN GLAUCOMA with Nanci Brisceo MD   Eye Clinic ([facility])    [facility]  [address] [place] [address] St   [address] [address]   [phone]               Who to contact     Please call your clinic at [phone] to:    Ask questions about your health    Make or cancel appointments    Discuss your medicines    Learn about your test results    Speak to your doctor            Additional Information About Your Visit        MyChart Information     [employer] is an electronic gateway that provides easy, online access to your medical records. With [employer], you can request a clinic appointment, read your test results, renew a prescription or communicate with your care team.     To sign up for [employer] visit the website at [email or web address]   You will be asked to enter the access code listed below, as well as some personal information. Please follow the directions to create your username and password.     Your access code is: [email or web address]  Expires: 2018  6:31 AM     Your access code will  in 90 days. If you need help or a new code, please contact your [facility] or call [phone] for assistance.        Care EveryWhere ID     This is your Care EveryWhere ID. This could be used by other organizations to access your [place] medical records  [id number]        Your Vitals Were     Last Period                   1998            Blood Pressure from Last 3 Encounters:   18 122/64   01/08/15 125/61   14 120/66    Weight from Last 3 Encounters:   18 54.4 kg (120 lb)   17 54 kg (119 lb)   01/08/15 54 kg (119 lb)              Today, you had the following     No orders found for display       Primary Care Provider Office Phone # Fax #    [facility] [phone]       [facility] [facility]. [address]  [facility] [address]        Equal Access to Services     [facility] : Hadeli Rosado, precious hart, mary ramires. So [facility] [phone].    ATENCIÓN: Si habla español, tiene a panchal disposición servicios gratuitos  de asistencia lingüística. Vivian al [phone].    We comply with applicable federal civil rights laws and [place] laws. We do not discriminate on the basis of race, color, national origin, age, disability, sex, sexual orientation, or gender identity.            Thank you!     Thank you for choosing EYE CLINIC  for your care. Our goal is always to provide you with excellent care. Hearing back from our patients is one way we can continue to improve our services. Please take a few minutes to complete the written survey that you may receive in the mail after your visit with us. Thank you!             Your Updated Medication List - Protect others around you: Learn how to safely use, store and throw away your medicines at www.disposemymeds.org.          This list is accurate as of 9/17/18  2:40 PM.  Always use your most recent med list.                   Brand Name Dispense Instructions for use Diagnosis    ascorbic acid 1000 MG Tabs    vitamin C     Take 1,000 mg by mouth daily        aspirin 81 MG tablet      Take 1 tablet by mouth daily        BACLOFEN PO      Take 10 mg by mouth 3 times daily as needed for muscle spasms        BEVACIZUMAB (AVASTIN) INJECTION 2.5MG           calcium 500 1250 (500 Ca) MG Tabs tablet   Generic drug:  calcium carbonate 500 mg {elemental}      Take 3 tablets by mouth        cyclobenzaprine 10 MG tablet    FLEXERIL    30 tablet    Take 1 tablet (10 mg) by mouth nightly as needed for muscle spasms    Sciatica, unspecified laterality       fish oil-omega-3 fatty acids 1000 MG capsule     180 capsule    Take 2 capsules by mouth daily.    Routine general medical examination at a health care facility       gabapentin 100 MG capsule    NEURONTIN     300 mg daily Pt to increase dosage starting at 100mg        hypromellose-dextran 0.3-0.1% 0.1-0.3 % Soln ophthalmic solution   Generic drug:  hypromellose-dextran      Apply 1 drop to eye as needed        PRESERVISION AREDS 2 PO      Take 2  capsules by mouth daily        PROLIA SC           VITAMIN D (CHOLECALCIFEROL) PO      Take by mouth daily

## 2018-09-25 DIAGNOSIS — H35.3130 BILATERAL NONEXUDATIVE AGE-RELATED MACULAR DEGENERATION: Primary | ICD-10-CM

## 2018-09-27 ENCOUNTER — OFFICE VISIT (OUTPATIENT)
Dept: OPHTHALMOLOGY | Facility: CLINIC | Age: 68
End: 2018-09-27
Attending: OPHTHALMOLOGY
Payer: MEDICARE

## 2018-09-27 DIAGNOSIS — H35.3130 BILATERAL NONEXUDATIVE AGE-RELATED MACULAR DEGENERATION: Primary | ICD-10-CM

## 2018-09-27 PROCEDURE — G0463 HOSPITAL OUTPT CLINIC VISIT: HCPCS | Mod: ZF

## 2018-09-27 PROCEDURE — 92250 FUNDUS PHOTOGRAPHY W/I&R: CPT | Mod: ZF | Performed by: OPHTHALMOLOGY

## 2018-09-27 PROCEDURE — 92134 CPTRZ OPH DX IMG PST SGM RTA: CPT | Mod: ZF | Performed by: OPHTHALMOLOGY

## 2018-09-27 ASSESSMENT — EXTERNAL EXAM - LEFT EYE: OS_EXAM: NORMAL

## 2018-09-27 ASSESSMENT — VISUAL ACUITY
OS_CC: 20/30
OD_SC+: -1
CORRECTION_TYPE: GLASSES
METHOD: SNELLEN - LINEAR
OD_SC: 20/25

## 2018-09-27 ASSESSMENT — REFRACTION_WEARINGRX
OD_AXIS: 178
OS_CYLINDER: +0.25
OD_ADD: +2.50
OS_ADD: +2.50
OS_AXIS: 055
OS_SPHERE: +3.75
SPECS_TYPE: PAL
OD_SPHERE: +3.00
OD_CYLINDER: +1.75

## 2018-09-27 ASSESSMENT — PACHYMETRY
OD_CT(UM): 562
OS_CT(UM): 539

## 2018-09-27 ASSESSMENT — EXTERNAL EXAM - RIGHT EYE: OD_EXAM: NORMAL

## 2018-09-27 ASSESSMENT — CONF VISUAL FIELD
OS_NORMAL: 1
METHOD: COUNTING FINGERS
OD_NORMAL: 1

## 2018-09-27 ASSESSMENT — TONOMETRY
IOP_METHOD: TONOPEN
OD_IOP_MMHG: 19
OS_IOP_MMHG: 18

## 2018-09-27 ASSESSMENT — SLIT LAMP EXAM - LIDS
COMMENTS: NORMAL
COMMENTS: NORMAL

## 2018-09-27 ASSESSMENT — CUP TO DISC RATIO
OS_RATIO: 0.4
OD_RATIO: 0.3

## 2018-09-27 NOTE — NURSING NOTE
Chief Complaints and History of Present Illnesses   Patient presents with     Follow Up For     Bilateral nonexudative age-related macular degeneration     HPI    Affected eye(s):  Both   Symptoms:        Frequency:  Constant       Do you have eye pain now?:  No      Comments:  States va is the same since last visit  No F&F  No red watery or dry  SP JANE Gunter COT 1:25 PM September 27, 2018

## 2018-09-27 NOTE — MR AVS SNAPSHOT
After Visit Summary   9/27/2018    Sondra Castro    MRN: [id number]           Patient Information     Date Of Birth          1950        Visit Information        Provider Department      9/27/2018 1:00 PM Kesha Patel MD [facility]        Today's Diagnoses     Bilateral nonexudative age-related macular degeneration - Both Eyes    -  1       Follow-ups after your visit        Follow-up notes from your care team     Return in about 6 months (around 3/27/2019) for Macular OCT, Follow Up, DFE, OCT.      Your next 10 appointments already scheduled     Oct 01, 2018  2:15 PM CDT   Post-Op with Nanci Briscoe MD   Eye Clinic ([facility])    [facility]  [address]  9[address] Clin 9a  [facility] [address]   [phone]            Oct 10, 2018 12:30 PM CDT   Post-Op with Nanci Briscoe MD   Eye Clinic ([facility])    [facility]  [address]  9[address] Fl Clin 9a  [facility] [address]   [phone]            Oct 15, 2018  2:15 PM CDT   Post-Op with Nanci Briscoe MD   Eye Clinic ([facility])    [facility]  [address]  9[address] Fl Clin 9a  [facility] [address]   [phone]            Nov 02, 2018 10:15 AM CDT   Post-Op with Nanci Briscoe MD   Eye Clinic ([facility])    [facility]  [address]  9th Fl Clin 9a  [facility] [address]   [phone]            Nov 12, 2018  2:15 PM CST   Post-Op with Nanci Briscoe MD   Eye Clinic ([facility])    [facility]  [address]  9[address] Fl Clin 9a  [facility] [address]   [phone]            Mar 28, 2019 12:15 PM CDT   RETURN RETINA with Kesha Patel MD   Eye Clinic ([facility])    [facility]  [address]  9th Fl Clin 9a  [facility] [address]   [phone]            Jun 17, 2019  1:00 PM CDT   RETURN GLAUCOMA with Nanci Briscoe MD    Eye Clinic ([facility])    Rick [address]  9th Fl Clin 9a  [facility] [address]   [phone]              Future tests that were ordered for you today     Open Future Orders        Priority Expected Expires Ordered    OCT Retina Spectralis OU (both eyes) Routine  3/30/2020 2018            Who to contact     Please call your clinic at [phone] to:    Ask questions about your health    Make or cancel appointments    Discuss your medicines    Learn about your test results    Speak to your doctor            Additional Information About Your Visit        [employer] Information     [employer] is an electronic gateway that provides easy, online access to your medical records. With [employer], you can request a clinic appointment, read your test results, renew a prescription or communicate with your care team.     To sign up for [employer] visit the website at [email or web address]   You will be asked to enter the access code listed below, as well as some personal information. Please follow the directions to create your username and password.     Your access code is: [email or web address]  Expires: 2018  6:31 AM     Your access code will  in 90 days. If you need help or a new code, please contact your [facility] or call [phone] for assistance.        Care EveryWhere ID     This is your Care EveryWhere ID. This could be used by other organizations to access your [place] medical records  [id number]        Your Vitals Were     Last Period                   1998            Blood Pressure from Last 3 Encounters:   18 122/64   01/08/15 125/61   14 120/66    Weight from Last 3 Encounters:   18 54.4 kg (120 lb)   17 54 kg (119 lb)   01/08/15 54 kg (119 lb)              We Performed the Following     Fundus Autofluorescence Image (FAF) OU (both eyes)     OCT Retina Spectralis OU (both eyes)        Primary Care  Provider Office Phone # Fax #    [facility] [phone] [address]  [facility] [address]        Equal Access to Services     ESTEFANI ISAAC : Angel villanueva vivianeo Sosudhaali, wajimda luqadaha, qaybta kaalmada ademariama, mary geejosey pugh. So [facility] [phone].    ATENCIÓN: Si habla español, tiene a panchal disposición servicios gratuitos de asistencia lingüística. [place] al [phone].    We comply with applicable federal civil rights laws and [place] laws. We do not discriminate on the basis of race, color, national origin, age, disability, sex, sexual orientation, or gender identity.            Thank you!     Thank you for choosing EYE CLINIC  for your care. Our goal is always to provide you with excellent care. Hearing back from our patients is one way we can continue to improve our services. Please take a few minutes to complete the written survey that you may receive in the mail after your visit with us. Thank you!             Your Updated Medication List - Protect others around you: Learn how to safely use, store and throw away your medicines at www.disposemymeds.org.          This list is accurate as of 9/27/18  3:13 PM.  Always use your most recent med list.                   Brand Name Dispense Instructions for use Diagnosis    ascorbic acid 1000 MG Tabs    vitamin C     Take 1,000 mg by mouth daily        aspirin 81 MG tablet      Take 1 tablet by mouth daily        BACLOFEN PO      Take 10 mg by mouth 3 times daily as needed for muscle spasms        BEVACIZUMAB (AVASTIN) INJECTION 2.5MG           calcium 500 1250 (500 Ca) MG Tabs tablet   Generic drug:  calcium carbonate 500 mg {elemental}      Take 3 tablets by mouth        cyclobenzaprine 10 MG tablet    FLEXERIL    30 tablet    Take 1 tablet (10 mg) by mouth nightly as needed for muscle spasms    Sciatica, unspecified laterality       fish oil-omega-3 fatty acids 1000 MG capsule     180  capsule    Take 2 capsules by mouth daily.    Routine general medical examination at a health care facility       gabapentin 100 MG capsule    NEURONTIN     300 mg daily Pt to increase dosage starting at 100mg        hypromellose-dextran 0.3-0.1% 0.1-0.3 % Soln ophthalmic solution   Generic drug:  hypromellose-dextran      Apply 1 drop to eye as needed        PRESERVISION AREDS 2 PO      Take 2 capsules by mouth daily        PROLIA SC           VITAMIN D (CHOLECALCIFEROL) PO      Take by mouth daily

## 2018-09-27 NOTE — PROGRESS NOTES
CC: follow up Age related macular degeneration     HPI:   s/p  CE/IOL right eye 9/11/18 and left eye scheduled for 10/10/18  Feels like there is a major improvement with right eye: improvements in discerning contrast, no new flashes, stable floaters.  She can still enjoy reading and denies scotomas, metamorphopsias.   Glaucoma under control  MS under control    OCT 9-27-18  Right eye: drusen Retinal pigment epithelium changes, no subretinal fluid; PHF attached, stable  Left eye: few drusen; stable. Vitreous attached    FAF  9-27-18  RE: mottled nasal macular hypo/hyperAF, stable  LE: minimal ST macular hypoAF, stable    Assessment & Plan:  1. History of Exudative Age related macular degeneration right eye     - s/p previous Avastin (x4 in 7/9/2013)   - No activity   - Observe  2. mild Age related macular degeneration left eye    - AREDS vitamins, Amsler grid monitoring weekly OCT stable, no injections needed    - Continue to Monitor   - (+) FH: both parents had Age related macular degeneration     3. Posterior vitreous detachment (PVD) both eyes    - Retina detachment precautions were discussed with the patient (presence or increased in flashes, floaters or a curtain in the visual field) and was asked to return if any of the those occur    4. Primary open angle glaucoma (POAG) suspect with mild cup:disc asymmetry   - (+) FH: dad had glaucoma   - Mildly abnormal visual field left eye in April   - IOP and exam stable, not on glaucoma drops currently   - S/p Selected laser trabeculoplasty (SLT) or Argon laser trabeculoplasty both eyes    - Narrow angle right eye with plateau configuation but both angles open.   - Followed by Dr. Briscoe    4. Optic neuritis left eye and history of Migraine aura without headache    - seen and evaluated by Dr. Hernandez in the past    5. Pseudophakia right eye    6. Cataract left eye   - Cataract extraction/IOL planned for 10/10/18    6. History of multiple sclerosis diagnosed several ys  ago   History of uveitis both eyes    History of taking IV IG  and steroids   currently without Treatment    Plan: Return to retina clinic 6-9 months, OCT and autofluorescence both eyes   Patient might be interested in conjunctiva bx stem cell research     Adilson Desir MD  PGY-3 Ophthalmology Resident  [phone]      ~~~~~~~~~~~~~~~~~~~~~~~~~~~~~~~~~~   Complete documentation of historical and exam elements from today's encounter can be found in the full encounter summary report (not reduplicated in this progress note).  I personally obtained the chief complaint(s) and history of present illness.  I confirmed and edited as necessary the review of systems, past medical/surgical history, family history, social history, and examination findings as documented by others; and I examined the patient myself.  I personally reviewed the relevant tests, images, and reports as documented above.  I formulated and edited as necessary the assessment and plan and discussed the findings and management plan with the patient and family    Kesha Patel MD  .  Retina Service   Department of Ophthalmology and Visual Neurosciences   [facility]  Phone: [phone]   Fax: [phone]

## 2018-10-01 ENCOUNTER — OFFICE VISIT (OUTPATIENT)
Dept: OPHTHALMOLOGY | Facility: CLINIC | Age: 68
End: 2018-10-01
Attending: OPHTHALMOLOGY
Payer: MEDICARE

## 2018-10-01 DIAGNOSIS — Z48.810 AFTERCARE FOLLOWING SURGERY OF A SENSORY ORGAN: Primary | ICD-10-CM

## 2018-10-01 PROCEDURE — G0463 HOSPITAL OUTPT CLINIC VISIT: HCPCS | Mod: ZF

## 2018-10-01 ASSESSMENT — VISUAL ACUITY
OS_CC+: -1
METHOD: SNELLEN - LINEAR
OD_SC: 20/25
OS_CC: 20/30
CORRECTION_TYPE: GLASSES
OD_SC+: -2

## 2018-10-01 ASSESSMENT — SLIT LAMP EXAM - LIDS
COMMENTS: NORMAL
COMMENTS: NORMAL

## 2018-10-01 ASSESSMENT — CUP TO DISC RATIO
OS_RATIO: 0.4
OD_RATIO: 0.3

## 2018-10-01 ASSESSMENT — CONF VISUAL FIELD
OS_NORMAL: 1
OD_NORMAL: 1

## 2018-10-01 ASSESSMENT — TONOMETRY
OD_IOP_MMHG: 12
IOP_METHOD: APPLANATION
OS_IOP_MMHG: 12

## 2018-10-01 ASSESSMENT — REFRACTION_WEARINGRX
SPECS_TYPE: PAL
OS_AXIS: 055
OD_SPHERE: +3.00
OS_ADD: +2.50
OS_SPHERE: +3.75
OD_CYLINDER: +1.75
OS_CYLINDER: +0.25
OD_ADD: +2.50
OD_AXIS: 178

## 2018-10-01 ASSESSMENT — EXTERNAL EXAM - RIGHT EYE: OD_EXAM: NORMAL

## 2018-10-01 ASSESSMENT — EXTERNAL EXAM - LEFT EYE: OS_EXAM: NORMAL

## 2018-10-01 NOTE — MR AVS SNAPSHOT
After Visit Summary   10/1/2018    Sondra Castro    MRN: [id number]           Patient Information     Date Of Birth          1950        Visit Information        Provider Department      10/1/2018 2:15 PM Nanci Briscoe MD [facility]        Today's Diagnoses     Aftercare following surgery of a sensory organ    -  1       Follow-ups after your visit        Your next 10 appointments already scheduled     Oct 10, 2018 12:30 PM CDT   Post-Op with Nanci Briscoe MD   Eye Clinic ([facility])    [facility]  [address] [place] [address]   [phone]            Oct 15, 2018  2:15 PM CDT   Post-Op with Nanci Briscoe MD   Eye Clinic ([facility])    [facility]  [address] [place] [address]   [phone]            Nov 02, 2018 10:15 AM CDT   Post-Op with Nanci Briscoe MD   Eye Clinic ([facility])    [facility]  [address] [place] [address]   [phone]            Nov 12, 2018  2:15 PM CST   Post-Op with Nanci Briscoe MD   Eye Clinic ([facility])    [facility]  [address] [place] [address]   [phone]            Mar 28, 2019 12:15 PM CDT   RETURN RETINA with Kesha Patel MD   Eye Clinic ([facility])    [facility]  [address] [place] [address]   [phone]            Jun 17, 2019  1:00 PM CDT   RETURN GLAUCOMA with Nanci Briscoe MD   Eye Clinic ([facility])    [facility]  [address] [place] [address]   [phone]              Who to contact     Please call your clinic at [phone] to:    Ask questions about your health    Make or cancel appointments    Discuss your medicines    Learn about your test results    Speak to your doctor             Additional Information About Your Visit        Care EveryWhere ID     This is your Care EveryWhere ID. This could be used by other organizations to access your [place] medical records  [id number]        Your Vitals Were     Last Period                   04/01/1998            Blood Pressure from Last 3 Encounters:   02/20/18 122/64   01/08/15 125/61   12/29/14 120/66    Weight from Last 3 Encounters:   02/20/18 54.4 kg (120 lb)   02/16/17 54 kg (119 lb)   01/08/15 54 kg (119 lb)              Today, you had the following     No orders found for display       Primary Care Provider Office Phone # Fax #    [facility] [phone] [address]  [facility] [address]        Equal Access to Services     ESTEFANI ISAAC : Hadii norbert pano Sodeyanira, waaxda luqadaha, qaybta kaalmada adeegyada, mary mehta . So [facility] [place] [phone].    ATENCIÓN: Si habla español, tiene a panchal disposición servicios gratuitos de asistencia lingüística. Llame al [phone].    We comply with applicable federal civil rights laws and [place] laws. We do not discriminate on the basis of race, color, national origin, age, disability, sex, sexual orientation, or gender identity.            Thank you!     Thank you for choosing [facility] CLINIC  for your care. Our goal is always to provide you with excellent care. Hearing back from our patients is one way we can continue to improve our services. Please take a few minutes to complete the written survey that you may receive in the mail after your visit with us. Thank you!             Your Updated Medication List - Protect others around you: Learn how to safely use, store and throw away your medicines at www.disposemymeds.org.          This list is accurate as of 10/1/18  4:27 PM.  Always use your most recent med list.                   Brand Name Dispense Instructions for use Diagnosis    ascorbic acid 1000 MG Tabs     vitamin C     Take 1,000 mg by mouth daily        aspirin 81 MG tablet      Take 1 tablet by mouth daily        BACLOFEN PO      Take 10 mg by mouth 3 times daily as needed for muscle spasms        BEVACIZUMAB (AVASTIN) INJECTION 2.5MG           calcium 500 1250 (500 Ca) MG Tabs tablet   Generic drug:  calcium carbonate 500 mg {elemental}      Take 3 tablets by mouth        cyclobenzaprine 10 MG tablet    FLEXERIL    30 tablet    Take 1 tablet (10 mg) by mouth nightly as needed for muscle spasms    Sciatica, unspecified laterality       fish oil-omega-3 fatty acids 1000 MG capsule     180 capsule    Take 2 capsules by mouth daily.    Routine general medical examination at a health care facility       gabapentin 100 MG capsule    NEURONTIN     300 mg daily Pt to increase dosage starting at 100mg        hypromellose-dextran 0.3-0.1% 0.1-0.3 % Soln ophthalmic solution   Generic drug:  hypromellose-dextran      Apply 1 drop to eye as needed        PRESERVISION AREDS 2 PO      Take 2 capsules by mouth daily        PROLIA SC           VITAMIN D (CHOLECALCIFEROL) PO      Take by mouth daily

## 2018-10-01 NOTE — PROGRESS NOTES
Postoperative week #3 cataract extraction with intraocular lens 9/11/18 right eye   History of suspect glaucoma s/p SLT (or ALT) both eyes, presenting for follow up.    HPI:   Seeing well for activities of daily living     Current Medications:   None    Octopus LVC left eye Similar to previous mena visual field (HVF) size V with some scatter but fairly normal    OCT retinal nerve fiber layer stable both eyes    Right eye normal   Left eye thin ST as before    Primary open angle glaucoma (POAG) suspect with mild cup:disc asymmetry   LVC full both eyes     Scatter on visual field left eye but also has diagnosis of optic neuritis left eye   OCT stable both eyes    Not on glaucoma drops currently   S/p Selected laser trabeculoplasty (SLT) or Argon laser trabeculoplasty both eyes     Narrow angle right eye with plateau configuation, stable   - Consider laser peripheral iridotomy (LPI) if progressive    Optic neuritis left eye - history of multiple sclerosis     Migraine aura without headache    Now with brief aura (few seconds) fairly frequently   Seen by Dr Hernandez in past    Age related macular degeneration    Seen by Dr. Patel   History of Wet Age related macular degeneration right eye; Dry left eye      Avastin x 4 right eye (most recent 7/9/2013)    Senile nuclear sclerosis left eye     Becoming symptomatic   BAT 20/80 on high    Plan:   Use prednisolone acetate 1% twice a day for 1 week, once a day for 1 week, then stop.    Severe nausea after cataract extraction right eye (topical) Prefers surgery left eye topical with NO IV SEDATION for surgery next week  Cataract extraction with intraocular lens left eye  Out patient  TOPICAL, NO SEDATION      Lamont Boateng M.D.  PGY-3, Ophthalmology

## 2018-10-01 NOTE — NURSING NOTE
Chief Complaints and History of Present Illnesses   Patient presents with     Follow Up For     3 week f/u s/p cataract extraction with intraocular lens 9/11/18 right eye      HPI    Affected eye(s):  Right   Symptoms:     No floaters   No flashes   No redness   No tearing   Dryness         Do you have eye pain now?:  No      Comments:  Pt here for a 3 week post op cataract extraction with intraocular lens 9/11/18 right eye. Pt states RE is doing fine. Pt does not a little dryness x 1 week BE intermittent.     Roseanna Tran, [facility] 2:39 PM October 1, 2018

## 2018-10-09 ENCOUNTER — TRANSFERRED RECORDS (OUTPATIENT)
Dept: HEALTH INFORMATION MANAGEMENT | Facility: CLINIC | Age: 68
End: 2018-10-09

## 2018-10-10 ENCOUNTER — OFFICE VISIT (OUTPATIENT)
Dept: OPHTHALMOLOGY | Facility: CLINIC | Age: 68
End: 2018-10-10
Attending: OPHTHALMOLOGY
Payer: MEDICARE

## 2018-10-10 DIAGNOSIS — Z48.810 AFTERCARE FOLLOWING SURGERY OF A SENSORY ORGAN: Primary | ICD-10-CM

## 2018-10-10 PROCEDURE — G0463 HOSPITAL OUTPT CLINIC VISIT: HCPCS | Mod: ZF

## 2018-10-10 RX ORDER — PREDNISOLONE ACETATE 10 MG/ML
1-2 SUSPENSION/ DROPS OPHTHALMIC 2 TIMES DAILY
COMMUNITY
End: 2019-03-27

## 2018-10-10 RX ORDER — KETOROLAC TROMETHAMINE 5 MG/ML
1 SOLUTION OPHTHALMIC 2 TIMES DAILY
COMMUNITY
End: 2019-03-27

## 2018-10-10 ASSESSMENT — TONOMETRY
IOP_METHOD: APPLANATION
IOP_METHOD: APPLANATION
OD_IOP_MMHG: 18
OS_IOP_MMHG: 28
IOP_METHOD: TONOPEN
OS_IOP_MMHG: 27
OD_IOP_MMHG: 17
OS_IOP_MMHG: 28

## 2018-10-10 ASSESSMENT — REFRACTION_WEARINGRX
OD_ADD: +2.50
OS_CYLINDER: +0.25
OD_AXIS: 178
OS_SPHERE: +3.75
SPECS_TYPE: PAL
OD_SPHERE: +3.00
OD_CYLINDER: +1.75
OS_AXIS: 055
OS_ADD: +2.50

## 2018-10-10 ASSESSMENT — CUP TO DISC RATIO
OD_RATIO: 0.3
OS_RATIO: 0.4

## 2018-10-10 ASSESSMENT — VISUAL ACUITY
METHOD: SNELLEN - LINEAR
OD_SC: 20/25
OS_SC: 20/30
OD_SC+: -3
OS_PH_SC: 20/25
OS_SC+: -2

## 2018-10-10 ASSESSMENT — SLIT LAMP EXAM - LIDS
COMMENTS: NORMAL
COMMENTS: NORMAL

## 2018-10-10 ASSESSMENT — EXTERNAL EXAM - RIGHT EYE: OD_EXAM: NORMAL

## 2018-10-10 ASSESSMENT — EXTERNAL EXAM - LEFT EYE: OS_EXAM: NORMAL

## 2018-10-10 NOTE — MR AVS SNAPSHOT
After Visit Summary   10/10/2018    Sondra Castro    MRN: [id number]           Patient Information     Date Of Birth          1950        Visit Information        Provider Department      10/10/2018 12:30 PM [facility]anci Briscoe MD [facility]        Today's Diagnoses     Aftercare following surgery of a sensory organ    -  1       Follow-ups after your visit        Your next 10 appointments already scheduled     Oct 15, 2018  2:15 PM CDT   Post-Op with Nanci Briscoe MD   Eye Clinic ([facility])    [address] [address]   [phone]            Nov 05, 2018  1:15 PM CST   Post-Op with Nanci Briscoe MD   Eye Clinic ([facility])    [address] [address]   [phone]            Nov 12, 2018  2:15 PM CST   Post-Op with Nanci Briscoe MD   Eye Clinic ([facility])    [address] [address]   [phone]            Mar 28, 2019 12:15 PM CDT   RETURN RETINA with Kesha Patel MD   Eye Clinic ([facility])    [address] [address]   [phone]            Jun 17, 2019  1:00 PM CDT   RETURN GLAUCOMA with Nanci Briscoe MD   Eye Clinic ([facility])    [address] [address]   [phone]              Who to contact     Please call your clinic at [phone] to:    Ask questions about your health    Make or cancel appointments    Discuss your medicines    Learn about your test results    Speak to your doctor            Additional Information About Your Visit        Care EveryWhere ID     This is your Care EveryWhere ID. This could be used by other organizations to access your [place] medical records  [id number]         Your Vitals Were     Last Period                   04/01/1998            Blood Pressure from Last 3 Encounters:   02/20/18 122/64   01/08/15 125/61   12/29/14 120/66    Weight from Last 3 Encounters:   02/20/18 54.4 kg (120 lb)   02/16/17 54 kg (119 lb)   01/08/15 54 kg (119 lb)              Today, you had the following     No orders found for display       Primary Care Provider Office Phone # Fax #    [facility] [phone] [address]  [facility] [address]        Equal Access to Services     : Hadii norbert renee Sodeyanira, waaxda luronda, qaybta kaalmada adesylvieda, mary mehta . So [facility] [phone].    ATENCIÓN: Si habla español, tiene a panchal disposición servicios gratuitos de asistencia lingüística. [facility] [phone].    We comply with applicable federal civil rights laws and [place] laws. We do not discriminate on the basis of race, color, national origin, age, disability, sex, sexual orientation, or gender identity.            Thank you!     Thank you for choosing [facility]  for your care. Our goal is always to provide you with excellent care. Hearing back from our patients is one way we can continue to improve our services. Please take a few minutes to complete the written survey that you may receive in the mail after your visit with us. Thank you!             Your Updated Medication List - Protect others around you: Learn how to safely use, store and throw away your medicines at www.disposemymeds.org.          This list is accurate as of 10/10/18  3:38 PM.  Always use your most recent med list.                   Brand Name Dispense Instructions for use Diagnosis    ascorbic acid 1000 MG Tabs    vitamin C     Take 1,000 mg by mouth daily        aspirin 81 MG tablet      Take 1 tablet by mouth daily        BACLOFEN PO      Take 10 mg by mouth 3 times daily as needed for muscle spasms        BEVACIZUMAB  (AVASTIN) INJECTION 2.5MG           calcium 500 1250 (500 Ca) MG Tabs tablet   Generic drug:  calcium carbonate 500 mg (elemental)      Take 3 tablets by mouth        cyclobenzaprine 10 MG tablet    FLEXERIL    30 tablet    Take 1 tablet (10 mg) by mouth nightly as needed for muscle spasms    Sciatica, unspecified laterality       fish oil-omega-3 fatty acids 1000 MG capsule     180 capsule    Take 2 capsules by mouth daily.    Routine general medical examination at a health care facility       gabapentin 100 MG capsule    NEURONTIN     300 mg daily Pt to increase dosage starting at 100mg        hypromellose-dextran 0.3-0.1% 0.1-0.3 % Soln ophthalmic solution   Generic drug:  hypromellose-dextran      Apply 1 drop to eye as needed        ketorolac 0.5 % ophthalmic solution    ACULAR     Place 1 drop Into the left eye 4 times daily        prednisoLONE acetate 1 % ophthalmic susp    PRED FORTE     Place 1-2 drops Into the left eye 4 times daily        PRESERVISION AREDS 2 PO      Take 2 capsules by mouth daily        PROLIA SC           VITAMIN D (CHOLECALCIFEROL) PO      Take by mouth daily

## 2018-10-10 NOTE — PROGRESS NOTES
Postoperative day 1 Cataract extraction/IOL 10/9/18 left eye  Postoperative week #4 cataract extraction with intraocular lens 9/11/18 right eye   History of suspect glaucoma s/p SLT (or ALT) both eyes, presenting for follow up.    HPI:   Seeing well for activities of daily living. Slept OK.    Current Medications:   None    Octopus LVC left eye Similar to previous mena visual field (HVF) size V with some scatter but fairly normal    OCT retinal nerve fiber layer stable both eyes    Right eye normal   Left eye thin ST as before    Assessment  Pseudophakia OU    Primary open angle glaucoma (POAG) suspect with mild cup:disc asymmetry   LVC full both eyes     Scatter on visual field left eye but also has diagnosis of optic neuritis left eye   OCT stable both eyes    Not on glaucoma drops currently   S/p Selected laser trabeculoplasty (SLT) or Argon laser trabeculoplasty both eyes     Narrow angle right eye with plateau configuation, stable   - Consider laser peripheral iridotomy (LPI) if progressive    Optic neuritis left eye - history of multiple sclerosis     Migraine aura without headache    Now with brief aura (few seconds) fairly frequently   Seen by Dr Hernandez in past    Age related macular degeneration    Seen by Dr. Patel   History of Wet Age related macular degeneration right eye; Dry left eye      Avastin x 4 right eye (most recent 7/9/2013)    Plan:   Use prednisolone acetate 1%  four times a day for 1 week, three times a day for 1 week, twice a day for 1 week, once a day for 1 week, then stop.  Ketorolac four times a day for 2 weeks, then stop  IOP elevated - monitor at 1 week visit  Postoperative instructions and sheet given including no bending, no lifting more than 10 pounds        Lamont Boateng M.D.  PGY-3, Ophthalmology

## 2018-10-10 NOTE — NURSING NOTE
Chief Complaints and History of Present Illnesses   Patient presents with     Post Op (Ophthalmology) Left Eye     HPI    Symptoms:           Do you have eye pain now?:  No      Comments:  One day follow up CE/IOL left eye.  DARRYL Mclain 12:28 PM 10/10/2018

## 2018-10-15 ENCOUNTER — OFFICE VISIT (OUTPATIENT)
Dept: OPHTHALMOLOGY | Facility: CLINIC | Age: 68
End: 2018-10-15
Attending: OPHTHALMOLOGY
Payer: MEDICARE

## 2018-10-15 DIAGNOSIS — Z48.810 AFTERCARE FOLLOWING SURGERY OF A SENSORY ORGAN: ICD-10-CM

## 2018-10-15 DIAGNOSIS — H53.8 BLURRED VISION, RIGHT EYE: Primary | ICD-10-CM

## 2018-10-15 PROCEDURE — G0463 HOSPITAL OUTPT CLINIC VISIT: HCPCS | Mod: ZF

## 2018-10-15 PROCEDURE — 92134 CPTRZ OPH DX IMG PST SGM RTA: CPT | Mod: ZF | Performed by: OPHTHALMOLOGY

## 2018-10-15 ASSESSMENT — VISUAL ACUITY
METHOD: SNELLEN - LINEAR
OD_SC+: +1
OS_SC: 20/30
OD_SC: 20/40
OS_SC+: -2
OD_PH_SC: 20/30
OS_PH_SC: 20/25-2

## 2018-10-15 ASSESSMENT — EXTERNAL EXAM - LEFT EYE: OS_EXAM: NORMAL

## 2018-10-15 ASSESSMENT — CONF VISUAL FIELD
METHOD: COUNTING FINGERS
OD_NORMAL: 1
OS_NORMAL: 1

## 2018-10-15 ASSESSMENT — REFRACTION_WEARINGRX
SPECS_TYPE: PAL
OS_ADD: +2.50
OD_SPHERE: +3.00
OD_ADD: +2.50
OS_SPHERE: +3.75
OS_AXIS: 055
OD_AXIS: 178
OS_CYLINDER: +0.25
OD_CYLINDER: +1.75

## 2018-10-15 ASSESSMENT — TONOMETRY
OD_IOP_MMHG: 15
OS_IOP_MMHG: 20
IOP_METHOD: APPLANATION

## 2018-10-15 ASSESSMENT — CUP TO DISC RATIO
OS_RATIO: 0.4
OD_RATIO: 0.3

## 2018-10-15 ASSESSMENT — EXTERNAL EXAM - RIGHT EYE: OD_EXAM: NORMAL

## 2018-10-15 ASSESSMENT — SLIT LAMP EXAM - LIDS
COMMENTS: NORMAL
COMMENTS: NORMAL

## 2018-10-15 NOTE — PROGRESS NOTES
Postoperative day 1 Cataract extraction/IOL 10/9/18 left eye  Postoperative week 6 cataract extraction with intraocular lens 9/11/18 right eye   History of suspect glaucoma s/p SLT (or ALT) both eyes, presenting for follow up.    HPI:   Seeing well for activities of daily living. Slept OK.    Current Medications:   None    Octopus LVC left eye Similar to previous mena visual field (HVF) size V with some scatter but fairly normal    OCT retinal nerve fiber layer stable both eyes    Right eye normal   Left eye thin ST as before    Assessment  Pseudophakia OU    Primary open angle glaucoma (POAG) suspect with mild cup:disc asymmetry   LVC full both eyes     Scatter on visual field left eye but also has diagnosis of optic neuritis left eye   OCT stable both eyes    Not on glaucoma drops currently   S/p Selected laser trabeculoplasty (SLT) or Argon laser trabeculoplasty both eyes     Narrow angle right eye with plateau configuation, stable   - Consider laser peripheral iridotomy (LPI) if progressive    Optic neuritis left eye - history of multiple sclerosis     Migraine aura without headache    Now with brief aura (few seconds) fairly frequently   Seen by Dr Hernandez in past    Age related macular degeneration    Seen by Dr. Patel   History of Wet Age related macular degeneration right eye; Dry left eye      Avastin x 4 right eye (most recent 7/9/2013)    Plan:   Use prednisolone acetate 1%  four times a day for 1 week, three times a day for 1 week, twice a day for 1 week, once a day for 1 week, then stop.  Ketorolac four times a day for 2 weeks, then stop  Visual acuity worse right eye today but OCT does not show fluid  May have worsening posterior capsular opacity (PCO) right eye or changing refraction   Return to clinic 2 weeks with manifest refraction and dilation    Attending Physician Attestation:  Complete documentation of historical and exam elements from today's encounter can be found in the full encounter  summary report (not reduplicated in this progress note). I personally obtained the chief complaint(s) and history of present illness. I confirmed and edited asnecessary the review of systems, past medical/surgical history, family history, social history, and examination findings as documented by others; and I examined the patient myself. I personally reviewed the relevant tests, images, and reports as documented above. I formulated and edited as necessary the assessment and plan and discussed the findings and management plan with the patient and family.  - Nanci Briscoe MD 3:48 PM 10/15/2018

## 2018-10-15 NOTE — NURSING NOTE
Chief Complaints and History of Present Illnesses   Patient presents with     Follow Up For     1 week follow up glaucoma, IOP check     HPI    Affected eye(s):  Both   Symptoms:     No floaters   No flashes   No redness   No Dryness   No itching         Do you have eye pain now?:  No      Comments:  Pt states vision is this same as last visit. No eye pain today. Pt notes that Ketorolac stings upon instillation.    Epifanio VARMA October 15, 2018 2:51 PM

## 2018-10-15 NOTE — MR AVS SNAPSHOT
After Visit Summary   10/15/2018    Sondra Castro    MRN: [id number]           Patient Information     Date Of Birth          1950        Visit Information        Provider Department      10/15/2018 2:15 PM [facility]anci Briscoe MD [facility]        Today's Diagnoses     Blurred vision, right eye    -  1    Aftercare following surgery of a sensory organ           Follow-ups after your visit        Your next 10 appointments already scheduled     Nov 05, 2018  1:15 PM CST   Post-Op with Nanci Briscoe MD   Eye Clinic ([facility])    [address] [address]   [phone]            Nov 12, 2018  2:15 PM CST   Post-Op with Nanci Briscoe MD   Eye Clinic ([facility])    [address] [address]   [phone]            Mar 28, 2019 12:15 PM CDT   RETURN RETINA with Kesha Patel MD   Eye Clinic ([facility])    [address] [address]   [phone]            Jun 17, 2019  1:00 PM CDT   RETURN GLAUCOMA with Nanci Briscoe MD   Eye Clinic ([facility])    [address] [address]   [phone]              Who to contact     Please call your clinic at [phone] to:    Ask questions about your health    Make or cancel appointments    Discuss your medicines    Learn about your test results    Speak to your doctor            Additional Information About Your Visit        Care EveryWhere ID     This is your Care EveryWhere ID. This could be used by other organizations to access your [place] medical records  [id number]        Your Vitals Were     Last Period                   04/01/1998            Blood Pressure from Last 3 Encounters:   02/20/18 122/64   01/08/15 125/61   12/29/14 120/66    Weight  from Last 3 Encounters:   02/20/18 54.4 kg (120 lb)   02/16/17 54 kg (119 lb)   01/08/15 54 kg (119 lb)              We Performed the Following     OCT Retina Spectralis OD (right eye)        Primary Care Provider Office Phone # Fax #    Noah Funes [facility] [place] [phone] [address]  [facility] [address]        Equal Access to Services     JACOB ISAAC : Hadii aad ku hadasho Soomaali, waaxda luqadaha, qaybta kaalmada adeegyada, waxay idiin hayaan adeeg radhadejahhuma mehta . So [facility] [phone].    ATENCIÓN: Si don bauer, tiene a panchal disposición servicios gratuitos de asistencia lingüística. Llame al [phone].    We comply with applicable federal civil rights laws and [place] laws. We do not discriminate on the basis of race, color, national origin, age, disability, sex, sexual orientation, or gender identity.            Thank you!     Thank you for choosing EYE CLINIC  for your care. Our goal is always to provide you with excellent care. Hearing back from our patients is one way we can continue to improve our services. Please take a few minutes to complete the written survey that you may receive in the mail after your visit with us. Thank you!             Your Updated Medication List - Protect others around you: Learn how to safely use, store and throw away your medicines at www.disposemymeds.org.          This list is accurate as of 10/15/18  4:14 PM.  Always use your most recent med list.                   Brand Name Dispense Instructions for use Diagnosis    ascorbic acid 1000 MG Tabs    vitamin C     Take 1,000 mg by mouth daily        aspirin 81 MG tablet      Take 1 tablet by mouth daily        BACLOFEN PO      Take 10 mg by mouth 3 times daily as needed for muscle spasms        BEVACIZUMAB (AVASTIN) INJECTION 2.5MG           calcium 500 1250 (500 Ca) MG Tabs tablet   Generic drug:  calcium carbonate 500 mg (elemental)      Take 3 tablets by mouth         cyclobenzaprine 10 MG tablet    FLEXERIL    30 tablet    Take 1 tablet (10 mg) by mouth nightly as needed for muscle spasms    Sciatica, unspecified laterality       fish oil-omega-3 fatty acids 1000 MG capsule     180 capsule    Take 2 capsules by mouth daily.    Routine general medical examination at a health care facility       gabapentin 100 MG capsule    NEURONTIN     300 mg daily Pt to increase dosage starting at 100mg        hypromellose-dextran 0.3-0.1% 0.1-0.3 % Soln ophthalmic solution   Generic drug:  hypromellose-dextran      Apply 1 drop to eye as needed        ketorolac 0.5 % ophthalmic solution    ACULAR     Place 1 drop Into the left eye 4 times daily        prednisoLONE acetate 1 % ophthalmic susp    PRED FORTE     Place 1-2 drops Into the left eye 4 times daily        PRESERVISION AREDS 2 PO      Take 2 capsules by mouth daily        PROLIA SC           VITAMIN D (CHOLECALCIFEROL) PO      Take by mouth daily

## 2018-10-16 ENCOUNTER — TELEPHONE (OUTPATIENT)
Dept: OPHTHALMOLOGY | Facility: CLINIC | Age: 68
End: 2018-10-16

## 2018-10-16 NOTE — TELEPHONE ENCOUNTER
Pt seen by dr. Briscoe yesterday  Wondering if appt tomorrow to f/u the distortion/vision changes right eye with dr. ackerman appropriate    Reviewed would recommend keeping appt tomorrow with retina specialist to review new distortion  H/o macular degeneration  OCT done yesterday no fluid reported on note-- reviewed with pt  Pt satisfied with information    Rafiq Linares RN 1:36 PM 10/16/18

## 2018-10-16 NOTE — TELEPHONE ENCOUNTER
[facility] Call Center    Phone Message    May a detailed message be left on voicemail: yes    Reason for Call: Other: Pt recently had cataract surgery, and was in yesterday to see Dr. Briscoe. Her vision has changed greatly. Dr. Briscoe did a test yesterday. Pt would like to know if this may be due to her Macular Degeneration. Pt scheduled appt w/ Dr. Patel for 10/17 @ 12:15, pt would like to know if she should keep this appt?     Action Taken: Message routed to:  [facility]

## 2018-11-01 ENCOUNTER — TELEPHONE (OUTPATIENT)
Dept: OPHTHALMOLOGY | Facility: CLINIC | Age: 68
End: 2018-11-01

## 2018-11-01 NOTE — TELEPHONE ENCOUNTER
[facility]    Phone Message    May a detailed message be left on voicemail: yes    Reason for Call: Other: Pt needs to cancel appt on 11/5/18     Action Taken: Message routed to:  [facility] Eye

## 2018-11-03 ENCOUNTER — TELEPHONE (OUTPATIENT)
Dept: OPHTHALMOLOGY | Facility: CLINIC | Age: 68
End: 2018-11-03

## 2018-11-03 NOTE — TELEPHONE ENCOUNTER
Received call from patient regarding left eye symptoms. She is s/p CEIOL 10/9/18 with Dr. Briscoe. She has been tapering PF drop LE, started once daily on 11/1. Last night she developed LE photosensitivity. No pain. Her vision has been more blurry than immediately following the surgery, she said this happened with her RE after CEIOL also. She used Ketoralac and the sensitivity improved. This morning the vision and photosensitivity are improved. I offered to see the patient but she said she is taking care of her son and unable to come in. I recommended increasing PF to BID over the weekend and using Ketoralac BID PRN. She is scheduled for follow-up 11/12, but I will send message to arrange for sooner follow-up. She will call with any concerns or worsening symptoms.     Jazmine Bermudez MD  Ophthalmology Resident, PGY-2

## 2018-11-07 ENCOUNTER — OFFICE VISIT (OUTPATIENT)
Dept: OPHTHALMOLOGY | Facility: CLINIC | Age: 68
End: 2018-11-07
Attending: OPHTHALMOLOGY
Payer: MEDICARE

## 2018-11-07 DIAGNOSIS — Z48.810 AFTERCARE FOLLOWING SURGERY OF A SENSORY ORGAN: Primary | ICD-10-CM

## 2018-11-07 PROCEDURE — G0463 HOSPITAL OUTPT CLINIC VISIT: HCPCS | Mod: ZF

## 2018-11-07 ASSESSMENT — REFRACTION_WEARINGRX
OS_AXIS: 055
OD_AXIS: 178
OD_SPHERE: +3.00
OD_CYLINDER: +1.75
OS_CYLINDER: +0.25
OD_ADD: +2.50
OS_ADD: +2.50
SPECS_TYPE: PAL
OS_SPHERE: +3.75

## 2018-11-07 ASSESSMENT — TONOMETRY
OS_IOP_MMHG: 19
IOP_METHOD: TONOPEN
OS_IOP_MMHG: 21
IOP_METHOD: APPLANATION
OD_IOP_MMHG: 14
OD_IOP_MMHG: 13

## 2018-11-07 ASSESSMENT — VISUAL ACUITY
OD_SC: 20/40
METHOD: SNELLEN - LINEAR
OS_SC: 20/30
OD_SC+: +2
OD_PH_SC: 20/25

## 2018-11-07 ASSESSMENT — EXTERNAL EXAM - LEFT EYE: OS_EXAM: NORMAL

## 2018-11-07 ASSESSMENT — SLIT LAMP EXAM - LIDS
COMMENTS: NORMAL
COMMENTS: NORMAL

## 2018-11-07 ASSESSMENT — EXTERNAL EXAM - RIGHT EYE: OD_EXAM: NORMAL

## 2018-11-07 NOTE — NURSING NOTE
Chief Complaints and History of Present Illnesses   Patient presents with     Post Op (Ophthalmology) Left Eye     left eye     HPI    Affected eye(s):  Left   Symptoms:     Photophobia      Duration:  5 days      Do you have eye pain now?:  Yes   Location:  OS   Pain Frequency:  Constant   Pain Characteristics:  Aching      Comments:  POP left eye.   The patient notes newer onset of left eye photophobia and ache.  DARRYL Mclain 2:23 PM 11/07/2018

## 2018-11-07 NOTE — MR AVS SNAPSHOT
After Visit Summary   11/7/2018    Sondra Castro    MRN: [id number]           Patient Information     Date Of Birth          1950        Visit Information        Provider Department      11/7/2018 2:15 PM [facility]i [facility]iscoe MD [facility]        Today's Diagnoses     Aftercare following surgery of a sensory organ - Left Eye    -  1       Follow-ups after your visit        Your next 10 appointments already scheduled     Nov 08, 2018  2:30 PM CST   NEW GENERAL with Bradley Frausto MD   Eye Clinic ([facility])    [address] [address]   [phone]            Nov 19, 2018  2:00 PM CST   Post-Op with Nanci Briscoe MD   Eye Clinic ([facility])    [address] [address]   [phone]            Mar 18, 2019 12:45 PM CDT   NEW RETINA with Layne Mena MD   Eye Clinic ([facility])    [address] [address]   [phone]            Jun 17, 2019  1:00 PM CDT   RETURN GLAUCOMA with Nanci Briscoe MD   Eye Clinic ([facility])    [address] [address]   [phone]              Who to contact     Please call your clinic at [phone] to:    Ask questions about your health    Make or cancel appointments    Discuss your medicines    Learn about your test results    Speak to your doctor            Additional Information About Your Visit        Care EveryWhere ID     This is your Care EveryWhere ID. This could be used by other organizations to access your [place] medical records  [id number]        Your Vitals Were     Last Period                   04/01/1998            Blood Pressure from Last 3 Encounters:   02/20/18 122/64   01/08/15 125/61   12/29/14 120/66    Weight from Last 3  Encounters:   02/20/18 54.4 kg (120 lb)   02/16/17 54 kg (119 lb)   01/08/15 54 kg (119 lb)              Today, you had the following     No orders found for display       Primary Care Provider Office Phone # Fax #    Noah Funes [facility] [phone] [address]  [place] [address]        Equal Access to Services     ESTEFANI ISAAC : Hadii aad ku hadasho Soomaali, waaxda luqadaha, qaybta kaalmada adeegyada, waxay kayceein haymarian adeeg nickhuma mehta . So [facility] [phone].    ATENCIÓN: Si don bauer, tiene a panchal disposición servicios gratuitos de asistencia lingüística. [place] al [phone].    We comply with applicable federal civil rights laws and [place] laws. We do not discriminate on the basis of race, color, national origin, age, disability, sex, sexual orientation, or gender identity.            Thank you!     Thank you for choosing EYE CLINIC  for your care. Our goal is always to provide you with excellent care. Hearing back from our patients is one way we can continue to improve our services. Please take a few minutes to complete the written survey that you may receive in the mail after your visit with us. Thank you!             Your Updated Medication List - Protect others around you: Learn how to safely use, store and throw away your medicines at www.disposemymeds.org.          This list is accurate as of 11/7/18  4:01 PM.  Always use your most recent med list.                   Brand Name Dispense Instructions for use Diagnosis    ascorbic acid 1000 MG Tabs    vitamin C     Take 1,000 mg by mouth daily        aspirin 81 MG tablet      Take 1 tablet by mouth daily        BACLOFEN PO      Take 10 mg by mouth 3 times daily as needed for muscle spasms        BEVACIZUMAB (AVASTIN) INJECTION 2.5MG           calcium 500 1250 (500 Ca) MG Tabs tablet   Generic drug:  calcium carbonate 500 mg (elemental)      Take 3 tablets by mouth        cyclobenzaprine 10 MG tablet     FLEXERIL    30 tablet    Take 1 tablet (10 mg) by mouth nightly as needed for muscle spasms    Sciatica, unspecified laterality       fish oil-omega-3 fatty acids 1000 MG capsule     180 capsule    Take 2 capsules by mouth daily.    Routine general medical examination at a health care facility       gabapentin 100 MG capsule    NEURONTIN     300 mg daily Pt to increase dosage starting at 100mg        hypromellose-dextran 0.3-0.1% 0.1-0.3 % Soln ophthalmic solution   Generic drug:  hypromellose-dextran      Apply 1 drop to eye as needed        ketorolac 0.5 % ophthalmic solution    ACULAR     Place 1 drop Into the left eye 2 times daily        prednisoLONE acetate 1 % ophthalmic susp    PRED FORTE     Place 1-2 drops Into the left eye 2 times daily        PRESERVISION AREDS 2 PO      Take 2 capsules by mouth daily        PROLIA SC           VITAMIN D (CHOLECALCIFEROL) PO      Take by mouth daily

## 2018-11-07 NOTE — PROGRESS NOTES
Postoperative month 1 Cataract extraction/IOL 10/9/18 left eye  Cataract extraction with intraocular lens 9/11/18 right eye   History of suspect glaucoma s/p SLT (or ALT) both eyes, presenting for follow up.    HPI:   Recent left eye dull ache and photophobia when tapered prednisolone  to daily left eye. Called on-call and per request, increased Pred to twice a day and restarted Ketorolac twice a day. Photophobia is mildly improved but still has dull ache that she likens to previous episode of optic neuritis about 15 years ago. Pain with eye movements persists. Mild blurred vision persists.     Current Medications:   None    Testing today  Color plates 11/11 right eye and 0/11 left eye     Assessment  New onset left eye pain   Occurred when tapered prednisolone  to daily; called on-call provider who asked patient to increase pred forte to twice a day and restart Ketorolac. Today symptoms persist and I do not see cell/flare on my exam. Unclear if patient had post-op rebound inflammation over the weekend.   She has an afferent pupillary defect that appears new (not recorded prior few visits) and she has 0/11 color plates left eye. She has had one episode of optic neuritis left eye in the past treated with IV steroids and has a confirmed history of MS (no treatment)   Unusual to have rebound inflammation while still on pred taper and no inflammation today with persistent symptoms   Dr. Eliana Durant is her neurologist   Saw Dr Hernandez 4/8/14 for ocular migraine, could see 11/11 color plates both eyes at that visit both eyes     Pseudophakia OU    Primary open angle glaucoma (POAG) suspect with mild cup:disc asymmetry   LVC full both eyes     Scatter on visual field left eye but also has diagnosis of optic neuritis left eye   OCT stable both eyes    Not on glaucoma drops currently   S/p Selected laser trabeculoplasty (SLT) or Argon laser trabeculoplasty both eyes     Narrow angle right eye with plateau configuation,  stable   - Consider laser peripheral iridotomy (LPI) if progressive    Optic neuritis left eye - history of multiple sclerosis     Migraine aura without headache    Now with brief aura (few seconds) fairly frequently   Seen by Dr Hernandez in past    Age related macular degeneration    Seen by Dr. Patel   History of Wet Age related macular degeneration right eye; Dry left eye      Avastin x 4 right eye (most recent 7/9/2013)    Plan:   Continue Pred Forte and ketorolac twice a day until her next visit with me in 2 weeks with manifest refraction at that itme  See Dr Frausto in continuity clinic tomorrow to staff with Dr Hernandez regarding possible optic neuritis left eye     Lamont Boateng M.D.  PGY-3, Ophthalmology    Attending Physician Attestation:  Complete documentation of historical and exam elements from today's encounter can be found in the full encounter summary report (not reduplicated in this progress note). I personally obtained the chief complaint(s) and history of present illness. I confirmed and edited asnecessary the review of systems, past medical/surgical history, family history, social history, and examination findings as documented by others; and I examined the patient myself. I personally reviewed the relevant tests, images, and reports as documented above. I formulated and edited as necessary the assessment and plan and discussed the findings and management plan with the patient and family.  - Nanci Briscoe MD 4:52 PM 11/7/2018

## 2018-11-08 ENCOUNTER — OFFICE VISIT (OUTPATIENT)
Dept: OPHTHALMOLOGY | Facility: CLINIC | Age: 68
End: 2018-11-08
Attending: OPHTHALMOLOGY
Payer: MEDICARE

## 2018-11-08 DIAGNOSIS — H46.9 OPTIC NEURITIS, LEFT: Primary | ICD-10-CM

## 2018-11-08 DIAGNOSIS — G35 MULTIPLE SCLEROSIS (H): ICD-10-CM

## 2018-11-08 PROCEDURE — 92133 CPTRZD OPH DX IMG PST SGM ON: CPT | Mod: ZF | Performed by: STUDENT IN AN ORGANIZED HEALTH CARE EDUCATION/TRAINING PROGRAM

## 2018-11-08 PROCEDURE — G0463 HOSPITAL OUTPT CLINIC VISIT: HCPCS | Mod: ZF | Performed by: TECHNICIAN/TECHNOLOGIST

## 2018-11-08 PROCEDURE — 92083 EXTENDED VISUAL FIELD XM: CPT | Mod: ZF | Performed by: STUDENT IN AN ORGANIZED HEALTH CARE EDUCATION/TRAINING PROGRAM

## 2018-11-08 ASSESSMENT — CONF VISUAL FIELD
OS_NORMAL: 1
METHOD: COUNTING FINGERS
OD_NORMAL: 1

## 2018-11-08 ASSESSMENT — VISUAL ACUITY
OD_PH_SC: 20/25+2
OS_PH_SC: 20/30-2
OD_SC: 20/30
OS_SC: 20/50
OD_SC+: -2
METHOD: SNELLEN - LINEAR

## 2018-11-08 ASSESSMENT — SLIT LAMP EXAM - LIDS
COMMENTS: NORMAL
COMMENTS: NORMAL

## 2018-11-08 ASSESSMENT — TONOMETRY
IOP_METHOD: ICARE
OD_IOP_MMHG: 18
OS_IOP_MMHG: 20

## 2018-11-08 ASSESSMENT — EXTERNAL EXAM - RIGHT EYE: OD_EXAM: NORMAL

## 2018-11-08 ASSESSMENT — EXTERNAL EXAM - LEFT EYE: OS_EXAM: NORMAL

## 2018-11-08 NOTE — PROGRESS NOTES
CC:     HPI: Sondra Castro is a 68 year old female who presents for acute visit. She was seen yesterday by Dr. Briscoe and referred for possible optic neuritis of the left eye. She had cataract surgery and 2-3 weeks post-op she noticed left eye light sensitivity.  Patient has a history of MS diagnosed in the 1990s and did receive IV steroids. Patient had been treated with Betaseron, but discontinued after flu-like side effects and switched to Copaxone before having side effects. Was switched to IVIG until 2011 or 2012. Patient is not using any treatment for MS currently. Reports pain with eye movements, persistent blurry vision. Pain with eye movements similar to prior episode of optic neuritis. She saw Dr. Hernandez in 2014 and had full color plates in both eyes. Denies diplopia, eye pain, redness, change in baseline from chronic symptoms of bladder issues, numbness.     MRI brain in August 2018 normal per patient.    POHx:  CEIOL each eye 2018 with Dr. Briscoe    Current Eye Medications:   Prednisolone twice daily left eye   Ketorolac twice daily left eye     Review of Testing:  OCT rNFL:  right eye: normal  Left eye: superotemporal thinning, stable back to 2014    Assessment & Plan   Sondra Castro is a 68 year old female with the following diagnoses:     1. History of multiple sclerosis:   Patient with longstanding history of MS dating back to the 1970s previously treated with Betaseron, Copaxone, and IVIG. Last treated in 2012 per patient. Notes new retro-orbital pain and pain with eye movement over the past week. Her visual acuity is 20/30 and 20/50. IOP normal. APD present left eye. Color plates full. Confrontational fields full. Motility full. Abducting nystagmus in right eye. Slit lamp exam unremarkable in both eyes. No optic disc edema in the left eye. Trace pallor in the left eye. OCT rNFL stable in the left eye. Normal right eye. Generalized depression in both eyes. Has been followed previously with [facility] fields. MRI  in August 2018 stable without evidence of new lesions per patient.      With her longstanding history of MS it is unlikely that she still has relapsing remitting MS especially with how well she has done off of treatment with stable MRI and unlikely that she has a new attack. She has stable OCT and visual fields without a central scotoma though not the usual protocol for her. The APD does not rule in optic neuritis now, but may have had prior episode with APD not documented before. Discussed with patient and will not obtain MRI presently and will have patient follow up with Dr. Briscoe and if not improved will consider MRI.     Discussed with Dr. Hernandez.    Bradley Frausto MD  Ophthalmology, PGY-4     Attending Physician Attestation:  Complete documentation of historical and exam elements from today's encounter can be found in the full encounter summary report (not reduplicated in this progress note).  I personally obtained the chief complaint(s) and history of present illness.  I confirmed and edited as necessary the review of systems, past medical/surgical history, family history, social history, and examination findings as documented by others; and I examined the patient myself.  I personally reviewed the relevant tests, images, and reports as documented above.  I formulated and edited as necessary the assessment and plan and discussed the findings and management plan with the patient and family. I personally reviewed the ophthalmic test(s) associated with this encounter, agree with the interpretation(s) as documented by the resident/fellow, and have edited the corresponding report(s) as necessary.  - Bradley Hernandez MD

## 2018-11-08 NOTE — MR AVS SNAPSHOT
After Visit Summary   11/8/2018    Sondra Castro    MRN: [id number]           Patient Information     Date Of Birth          1950        Visit Information        Provider Department      11/8/2018 2:30 PM Bradley Frausto MD [facility]        Today's Diagnoses     Optic neuritis, left    -  1    Multiple sclerosis (H)           Follow-ups after your visit        Follow-up notes from your care team     Return for Follow Up with Luis Felipe as scheduled.      Your next 10 appointments already scheduled     Nov 19, 2018  2:00 PM CST   Post-Op with Nanci Briscoe MD   Eye Clinic ([facility])    [address]  [facility] [address]   [phone]            Mar 18, 2019 12:45 PM CDT   NEW RETINA with Layne Mena MD   Eye Clinic ([facility])    [address]  [facility] [address]   [phone]            Jun 17, 2019  1:00 PM CDT   RETURN GLAUCOMA with Nanci Briscoe MD   Eye Clinic ([facility])    [address]  [facility] [address]   [phone]              Who to contact     Please call your clinic at [phone] to:    Ask questions about your health    Make or cancel appointments    Discuss your medicines    Learn about your test results    Speak to your doctor            Additional Information About Your Visit        Care EveryWhere ID     This is your Care EveryWhere ID. This could be used by other organizations to access your [place] medical records  [id number]        Your Vitals Were     Last Period                   04/01/1998            Blood Pressure from Last 3 Encounters:   02/20/18 122/64   01/08/15 125/61   12/29/14 120/66    Weight from Last 3 Encounters:   02/20/18 54.4 kg (120 lb)   02/16/17 54 kg (119 lb)   01/08/15 54 kg (119 lb)              We Performed the Following      Glaucoma Top OU     OCT Optic Nerve RNFL Spectralis OU (both eyes)        Primary Care Provider Office Phone # Fax #    [facility] [phone] [address]  [place] [address]        Equal Access to Services     ESTEFANI ISAAC : Hadeli villanueva hadmikeo Soomaali, waaxda luqadaha, qaybta kaalmada adeegyada, waxalfred inman afshann adeash silva janine pugh. So [facility] [phone].    ATENCIÓN: Si habla español, tiene a panchal disposición servicios gratuitos de asistencia lingüística. Llame al [phone].    We comply with applicable federal civil rights laws and [place] laws. We do not discriminate on the basis of race, color, national origin, age, disability, sex, sexual orientation, or gender identity.            Thank you!     Thank you for choosing EYE CLINIC  for your care. Our goal is always to provide you with excellent care. Hearing back from our patients is one way we can continue to improve our services. Please take a few minutes to complete the written survey that you may receive in the mail after your visit with us. Thank you!             Your Updated Medication List - Protect others around you: Learn how to safely use, store and throw away your medicines at www.disposemymeds.org.          This list is accurate as of 11/8/18 11:59 PM.  Always use your most recent med list.                   Brand Name Dispense Instructions for use Diagnosis    ascorbic acid 1000 MG Tabs    vitamin C     Take 1,000 mg by mouth daily        aspirin 81 MG tablet      Take 1 tablet by mouth daily        BACLOFEN PO      Take 10 mg by mouth 3 times daily as needed for muscle spasms        BEVACIZUMAB (AVASTIN) INJECTION 2.5MG           calcium 500 1250 (500 Ca) MG Tabs tablet   Generic drug:  calcium carbonate 500 mg (elemental)      Take 3 tablets by mouth        cyclobenzaprine 10 MG tablet    FLEXERIL    30 tablet    Take 1 tablet (10 mg) by mouth nightly as needed for muscle spasms     Sciatica, unspecified laterality       fish oil-omega-3 fatty acids 1000 MG capsule     180 capsule    Take 2 capsules by mouth daily.    Routine general medical examination at a health care facility       gabapentin 100 MG capsule    NEURONTIN     300 mg daily Pt to increase dosage starting at 100mg        hypromellose-dextran 0.3-0.1% 0.1-0.3 % Soln ophthalmic solution   Generic drug:  hypromellose-dextran      Apply 1 drop to eye as needed        ketorolac 0.5 % ophthalmic solution    ACULAR     Place 1 drop Into the left eye 2 times daily        prednisoLONE acetate 1 % ophthalmic susp    PRED FORTE     Place 1-2 drops Into the left eye 2 times daily        PRESERVISION AREDS 2 PO      Take 2 capsules by mouth daily        PROLIA SC           VITAMIN D (CHOLECALCIFEROL) PO      Take by mouth daily

## 2018-11-08 NOTE — NURSING NOTE
Chief Complaints and History of Present Illnesses   Patient presents with     Follow Up For     initial visit for optic neuritis     HPI    Symptoms:              Comments:  Last MRI of brain was a year ago, done at [facility].     Concerns for Optic neuritis in left eye. Recently had cataract surgery each eye by Dr. Briscoe. Vision in left eye is still not clear and sharp.   History of MS.   Denies double vision.     BERNARDINO Adame 11/8/2018 3:01 PM

## 2018-11-19 ENCOUNTER — OFFICE VISIT (OUTPATIENT)
Dept: OPHTHALMOLOGY | Facility: CLINIC | Age: 68
End: 2018-11-19
Attending: OPHTHALMOLOGY
Payer: MEDICARE

## 2018-11-19 DIAGNOSIS — Z48.810 AFTERCARE FOLLOWING SURGERY OF A SENSORY ORGAN: Primary | ICD-10-CM

## 2018-11-19 PROCEDURE — 92015 DETERMINE REFRACTIVE STATE: CPT | Mod: GY,ZF

## 2018-11-19 PROCEDURE — G0463 HOSPITAL OUTPT CLINIC VISIT: HCPCS | Mod: ZF

## 2018-11-19 ASSESSMENT — TONOMETRY
IOP_METHOD: TONOPEN
OS_IOP_MMHG: 19
IOP_METHOD: APPLANATION
OD_IOP_MMHG: 16
OS_IOP_MMHG: 17
OD_IOP_MMHG: 17

## 2018-11-19 ASSESSMENT — REFRACTION_WEARINGRX
OD_AXIS: 178
OS_CYLINDER: +0.25
OD_CYLINDER: +1.75
OS_AXIS: 055
OS_ADD: +2.50
OS_SPHERE: +3.75
SPECS_TYPE: PAL
OD_ADD: +2.50
OD_SPHERE: +3.00

## 2018-11-19 ASSESSMENT — VISUAL ACUITY
OD_SC: 20/25
OS_PH_SC: 20/60+2
OS_SC+: +1
OS_SC: 20/70
OD_SC+: -3
METHOD: SNELLEN - LINEAR

## 2018-11-19 ASSESSMENT — SLIT LAMP EXAM - LIDS
COMMENTS: NORMAL
COMMENTS: NORMAL

## 2018-11-19 ASSESSMENT — EXTERNAL EXAM - LEFT EYE: OS_EXAM: NORMAL

## 2018-11-19 ASSESSMENT — EXTERNAL EXAM - RIGHT EYE: OD_EXAM: NORMAL

## 2018-11-19 NOTE — NURSING NOTE
Chief Complaints and History of Present Illnesses   Patient presents with     Post Op (Ophthalmology) Left Eye     CE/IOL     HPI    Affected eye(s):  Left   Symptoms:     Blurred vision   Foreign body sensation   Photophobia         Do you have eye pain now?:  No      Comments:  POP left eye CE/IOL on 10/09/2018.  The patient notes she has a constant FB sensation in the left eye.  DARRYL Mclain 2:11 PM 11/19/2018

## 2018-11-19 NOTE — MR AVS SNAPSHOT
After Visit Summary   11/19/2018    Sondra Castro    MRN: [id number]           Patient Information     Date Of Birth          1950        Visit Information        Provider Department      11/19/2018 2:00 PM [facility]i [facility]iscoe MD [facility]        Today's Diagnoses     Aftercare following surgery of a sensory organ - Left Eye    -  1       Follow-ups after your visit        Follow-up notes from your care team     Return in about 2 weeks (around 12/3/2018).      Your next 10 appointments already scheduled     Dec 10, 2018  1:15 PM CST   RETURN GLAUCOMA with Nanci Briscoe MD   Eye Clinic ([facility])    [address]  [facility] [address]   [phone]            Mar 18, 2019 12:45 PM CDT   NEW RETINA with Layne Mena MD   Eye Clinic ([facility])    [address] [place] [id number]   [phone]            Jun 17, 2019  1:00 PM CDT   RETURN GLAUCOMA with Nanci Briscoe MD   Eye Clinic ([facility])    [address]  [facility] [address]   [phone]              Who to contact     Please call your clinic at [phone] to:    Ask questions about your health    Make or cancel appointments    Discuss your medicines    Learn about your test results    Speak to your doctor            Additional Information About Your Visit        Care EveryWhere ID     This is your Care EveryWhere ID. This could be used by other organizations to access your [place] medical records  [id number]        Your Vitals Were     Last Period                   04/01/1998            Blood Pressure from Last 3 Encounters:   02/20/18 122/64   01/08/15 125/61   12/29/14 120/66    Weight from Last 3 Encounters:   02/20/18 54.4 kg (120 lb)   02/16/17 54 kg (119 lb)   01/08/15 54 kg (119 lb)              Today, you had the  following     No orders found for display       Primary Care Provider Office Phone # Fax #    Noah Morenopee [facility] [phone] [address]  [facility] [address]        Equal Access to Services     ESTEFANI ISAAC : Angel villanueva víctor Sosudhaali, waaxda luqadaha, qaybta kaalmada ademariama, mary ascencio laCarlotajosey pugh. So [facility] [place] [facility] [phone].    ATENCIÓN: Si habla español, tiene a panchal disposición servicios gratuitos de asistencia lingüística. [facility] [phone].    We comply with applicable federal civil rights laws and [place] laws. We do not discriminate on the basis of race, color, national origin, age, disability, sex, sexual orientation, or gender identity.            Thank you!     Thank you for choosing EYE CLINIC  for your care. Our goal is always to provide you with excellent care. Hearing back from our patients is one way we can continue to improve our services. Please take a few minutes to complete the written survey that you may receive in the mail after your visit with us. Thank you!             Your Updated Medication List - Protect others around you: Learn how to safely use, store and throw away your medicines at www.disposemymeds.org.          This list is accurate as of 11/19/18  3:27 PM.  Always use your most recent med list.                   Brand Name Dispense Instructions for use Diagnosis    ascorbic acid 1000 MG Tabs    vitamin C     Take 1,000 mg by mouth daily        aspirin 81 MG tablet      Take 1 tablet by mouth daily        BACLOFEN PO      Take 10 mg by mouth 3 times daily as needed for muscle spasms        BEVACIZUMAB (AVASTIN) INJECTION 2.5MG           calcium 500 1250 (500 Ca) MG Tabs tablet   Generic drug:  calcium carbonate 500 mg (elemental)      Take 3 tablets by mouth        cyclobenzaprine 10 MG tablet    FLEXERIL    30 tablet    Take 1 tablet (10 mg) by mouth nightly as needed for muscle spasms    Sciatica, unspecified  laterality       fish oil-omega-3 fatty acids 1000 MG capsule     180 capsule    Take 2 capsules by mouth daily.    Routine general medical examination at a health care facility       gabapentin 100 MG capsule    NEURONTIN     300 mg daily Pt to increase dosage starting at 100mg        hypromellose-dextran 0.3-0.1% 0.1-0.3 % ophthalmic solution   Generic drug:  hypromellose-dextran      Apply 1 drop to eye as needed        ketorolac 0.5 % ophthalmic solution    ACULAR     Place 1 drop Into the left eye 2 times daily        prednisoLONE acetate 1 % ophthalmic susp    PRED FORTE     Place 1-2 drops Into the left eye 2 times daily        PRESERVISION AREDS 2 PO      Take 2 capsules by mouth daily        PROLIA SC           VITAMIN D (CHOLECALCIFEROL) PO      Take by mouth daily

## 2018-11-19 NOTE — PROGRESS NOTES
Postoperative month 1.5 Cataract extraction/IOL 10/9/18 left eye  Cataract extraction with intraocular lens 9/11/18 right eye   History of suspect glaucoma s/p SLT (or ALT) both eyes, presenting for follow up.    HPI:   Continued light sensitivity, aching improved mid week. Pain with eye movements has also resolved. Vision feels more blurry than last visit. She is still on the 2 drops twice a day (did not take today). Saw Dr. Frausto/ Dr. Hernandez in neuroophthalmology clinic, thought highly unlikely to be optic neuritis. Did not get an MRI, but would recommend if continues to worsen.    Current Medications:   stopped 11/19/18 stopped 11/19/18    Testing today  Color plates 11/11 right eye and 11/11 left eye today  11/11 right eye and 0/11 left eye (11/7/18)    Assessment  Medicamentosa with decreased vision and pain- likely accounts for decline in VA, color plates are 11/11 today, no definite APD noted.    Occurred when tapered prednisolone  to daily; called on-call provider who asked patient to increase pred forte to twice a day and restart Ketorolac.      History of optic neuritis and MS   Dr. Eliana Durant is her neurologist   Saw Dr Hernandez 4/8/14 for ocular migraine, could see 11/11 color plates both eyes at that visit both eyes    Saw Dr. Frausto 11/7, staffed with Dr. Hernandez, low suspicion for recurrence of optic neuritis given 11/11 color plates, APD that was more likely chronic    Pseudophakia OU    Primary open angle glaucoma (POAG) suspect with mild cup:disc asymmetry   LVC full both eyes     Scatter on visual field left eye but also has diagnosis of optic neuritis left eye   OCT stable both eyes    Not on glaucoma drops currently   S/p Selected laser trabeculoplasty (SLT) or Argon laser trabeculoplasty both eyes     Narrow angle right eye with plateau configuation, stable   - Consider laser peripheral iridotomy (LPI) if progressive    Migraine aura without headache    Now with brief aura (few seconds) fairly  frequently   Seen by Dr Hernandez in past    Age related macular degeneration    Seen by Dr. Patel   History of Wet Age related macular degeneration right eye; Dry left eye      Avastin x 4 right eye (most recent 7/9/2013)    Plan:    Stop ketorolac and prednisolone   Begin pres free artificial tears hourly left eye    Return to clinic 2-3 weeks    Tyra Alcantara MD  PGY-3 Ophthalmology    Attending Physician Attestation:  Complete documentation of historical and exam elements from today's encounter can be found in the full encounter summary report (not reduplicated in this progress note). I personally obtained the chief complaint(s) and history of present illness. I confirmed and edited asnecessary the review of systems, past medical/surgical history, family history, social history, and examination findings as documented by others; and I examined the patient myself. I personally reviewed the relevant tests, images, and reports as documented above. I formulated and edited as necessary the assessment and plan and discussed the findings and management plan with the patient and family.  - Nanci Briscoe MD 3:26 PM 11/19/2018

## 2018-12-10 ENCOUNTER — OFFICE VISIT (OUTPATIENT)
Dept: OPHTHALMOLOGY | Facility: CLINIC | Age: 68
End: 2018-12-10
Attending: OPHTHALMOLOGY
Payer: MEDICARE

## 2018-12-10 DIAGNOSIS — Z48.810 AFTERCARE FOLLOWING SURGERY OF A SENSORY ORGAN: Primary | ICD-10-CM

## 2018-12-10 PROCEDURE — 92015 DETERMINE REFRACTIVE STATE: CPT | Mod: GY,ZF

## 2018-12-10 PROCEDURE — G0463 HOSPITAL OUTPT CLINIC VISIT: HCPCS | Mod: ZF

## 2018-12-10 ASSESSMENT — TONOMETRY
OS_IOP_MMHG: 14
OD_IOP_MMHG: 12
IOP_METHOD: APPLANATION

## 2018-12-10 ASSESSMENT — REFRACTION_MANIFEST
OD_ADD: +2.75
OS_CYLINDER: +1.75
OS_AXIS: 180
OD_CYLINDER: +1.50
OD_AXIS: 005
OD_SPHERE: -0.25
OS_ADD: +2.75
OS_SPHERE: -0.50

## 2018-12-10 ASSESSMENT — VISUAL ACUITY
METHOD: SNELLEN - LINEAR
OS_PH_SC: 20/25
OD_SC+: -2
OD_SC: 20/40
OD_PH_SC: 20/25-2
OS_PH_SC+: -2
OS_SC+: +1
OS_SC: 20/40

## 2018-12-10 ASSESSMENT — EXTERNAL EXAM - RIGHT EYE: OD_EXAM: NORMAL

## 2018-12-10 ASSESSMENT — SLIT LAMP EXAM - LIDS
COMMENTS: NORMAL
COMMENTS: NORMAL

## 2018-12-10 ASSESSMENT — EXTERNAL EXAM - LEFT EYE: OS_EXAM: NORMAL

## 2018-12-10 NOTE — PROGRESS NOTES
Postoperative month 2 Cataract extraction/IOL 10/9/18 left eye  Cataract extraction with intraocular lens 9/11/18 right eye   History of suspect glaucoma s/p SLT (or ALT) both eyes, presenting for follow up.    HPI:   Light sensitivity improved almost immediately after stopping drops. Feeling significantly better, vision is better. Some residual aching in left eye socket. Intermittent, improved. Saw Dr. Frausto/ Dr. Hernandez in neuroophthalmology clinic, thought highly unlikely to be optic neuritis. Did not get an MRI, but would recommend if continues to worsen.     Current Medications:   stopped 11/19/18 stopped 11/19/18  PF Artificial tears four times a day both eyes     Testing Nov 2018  Color plates 11/11 right eye and 11/11 left eye 11/19/18  11/11 right eye and 0/11 left eye (11/7/18)    Assessment  Medicamentosa with decreased vision and pain-resolved   -Continue artificial tears     History of optic neuritis and MS   Dr. Eliana Durant is her neurologist   Saw Dr Hernandez 4/8/14 for ocular migraine, could see 11/11 color plates both eyes at that visit both eyes    Saw Dr. Frausto 11/7, staffed with Dr. Hernandez, low suspicion for recurrence of optic neuritis given 11/11 color plates, APD that was more likely chronic    Pseudophakia OU    Primary open angle glaucoma (POAG) suspect with mild cup:disc asymmetry   LVC full both eyes     Scatter on visual field left eye but also has diagnosis of optic neuritis left eye   OCT stable both eyes    Not on glaucoma drops currently with excellent intraocular pressure    S/p Selected laser trabeculoplasty (SLT) or Argon laser trabeculoplasty both eyes     History of narrow angle, now pseudophakic    Migraine aura without headache    Now with brief aura (few seconds) fairly frequently   Seen by Dr Hernandez in past    Age related macular degeneration    Seen by Dr. Patel   History of Wet Age related macular degeneration right eye; Dry left eye      Avastin x 4 right eye (most recent  7/9/2013)    Plan:    Continue artificial tears Four times a day both eyes  Return to clinic 6 months for OCT retinal nerve fiber layer and Octopus visual field GTOP, yearly thereafter if stable  Prescription per manifest refraction both eyes today    Tyra Alcantara MD  PGY-3 Ophthalmology

## 2019-01-02 DIAGNOSIS — N31.9 NEUROGENIC BLADDER: ICD-10-CM

## 2019-01-02 DIAGNOSIS — G35 MS (MULTIPLE SCLEROSIS) (H): Primary | ICD-10-CM

## 2019-02-18 DIAGNOSIS — N31.9 NEUROGENIC BLADDER: Primary | ICD-10-CM

## 2019-03-11 ENCOUNTER — OFFICE VISIT (OUTPATIENT)
Dept: UROLOGY | Facility: CLINIC | Age: 69
End: 2019-03-11
Payer: MEDICARE

## 2019-03-11 ENCOUNTER — HOSPITAL ENCOUNTER (OUTPATIENT)
Dept: CT IMAGING | Facility: CLINIC | Age: 69
Discharge: HOME | End: 2019-03-11
Attending: UROLOGY | Admitting: UROLOGY
Payer: MEDICARE

## 2019-03-11 VITALS
OXYGEN SATURATION: 99 % | WEIGHT: 120 LBS | BODY MASS INDEX: 21.26 KG/M2 | HEART RATE: 62 BPM | DIASTOLIC BLOOD PRESSURE: 60 MMHG | HEIGHT: 63 IN | SYSTOLIC BLOOD PRESSURE: 98 MMHG

## 2019-03-11 DIAGNOSIS — G35 MS (MULTIPLE SCLEROSIS) (H): ICD-10-CM

## 2019-03-11 DIAGNOSIS — N31.9 NEUROGENIC BLADDER: ICD-10-CM

## 2019-03-11 LAB — RESIDUAL VOLUME (RV) (EXTERNAL): 41

## 2019-03-11 PROCEDURE — 74178 CT ABD&PLV WO CNTR FLWD CNTR: CPT

## 2019-03-11 PROCEDURE — 51798 US URINE CAPACITY MEASURE: CPT | Performed by: UROLOGY

## 2019-03-11 PROCEDURE — 99214 OFFICE O/P EST MOD 30 MIN: CPT | Mod: 25 | Performed by: UROLOGY

## 2019-03-11 PROCEDURE — 25000128 H RX IP 250 OP 636: Performed by: UROLOGY

## 2019-03-11 PROCEDURE — 25000125 ZZHC RX 250: Performed by: UROLOGY

## 2019-03-11 RX ORDER — VERAPAMIL HYDROCHLORIDE 240 MG/1
240 CAPSULE, EXTENDED RELEASE ORAL
COMMUNITY
Start: 2019-02-04

## 2019-03-11 RX ORDER — IOPAMIDOL 755 MG/ML
100 INJECTION, SOLUTION INTRAVASCULAR ONCE
Status: COMPLETED | OUTPATIENT
Start: 2019-03-11 | End: 2019-03-11

## 2019-03-11 RX ADMIN — IOPAMIDOL 100 ML: 755 INJECTION, SOLUTION INTRAVENOUS at 14:20

## 2019-03-11 RX ADMIN — SODIUM CHLORIDE 60 ML: 9 INJECTION, SOLUTION INTRAVENOUS at 14:20

## 2019-03-11 ASSESSMENT — PAIN SCALES - GENERAL: PAINLEVEL: NO PAIN (0)

## 2019-03-11 ASSESSMENT — MIFFLIN-ST. JEOR: SCORE: 1035.51

## 2019-03-11 NOTE — LETTER
RE: Sondra Castro  [address] [facility] [place] [address]     Dear Colleague,    Thank you for referring your patient, Sondra Castro, to the [facility] [place] at [facility]. Please see a copy of my visit note below.    History: It is a great pleasure to see this very pleasant 68-year-old lady in follow-up consultation today.  She has a history of neurogenic bladder as a result of multiple sclerosis; she has had about 2 infections in the last year, but has had no major exacerbations of multiple sclerosis during this time.  At times she can have some difficulty emptying the bladder with frequency and some nocturia.  Symptom score today was 30, although she states she is fairly satisfied, postvoid residual today was 41 cc.  Urinalysis was negative.  General medical condition is otherwise stable  The ultrasound last year showed some mild hydronephrosis of the lower pole of the left kidney although this had not changed significantly.  Renal function is been consistently excellent with the most recent, 0.62 in January of this year.  We decided to perform a CT scan today to more closely evaluate the upper urinary tract  CT ABDOMEN AND PELVIS WITHOUT AND WITH CONTRAST   3/11/2019 2:41 PM      HISTORY: Bladder disorder. MS (multiple sclerosis) (H). Neurogenic  bladder.     TECHNIQUE: CT of the abdomen and pelvis was performed without and  following the administration of 100 mL Isovue-370. Radiation dose for  this scan was reduced using automated exposure control, adjustment of  the mA and/or kV according to patient size, or iterative  reconstruction technique.     COMPARISON: None.     FINDINGS:   On the noncontrast enhanced images, there are no stones in the  kidneys.     On the postcontrast enhanced images, there appears to be a duplicated  left renal collecting system. There is moderate hydronephrosis of the  lower pole moiety. This may partly relate to  a ureteropelvic junction  obstruction. No evidence for hydroureter. The left ureters are  incompletely opacified but opacified portions appear grossly  unremarkable. There is a small filling defect measuring approximately  3 mm in a calyx of the left lower pole moiety.     The opacified portions of the intra and extrarenal collecting systems  appear grossly unremarkable.     The bladder is poorly opacified.     There are scattered colonic diverticuli. These are most concentrated  in the sigmoid colon.                                                                      IMPRESSION:   1. Duplicated left renal collecting system. Moderate hydronephrosis of  the lower pole moiety likely secondary to a ureteral pelvic junction  obstruction. Tiny nonspecific filling defect in a lower pole moiety  calyx best seen on image 30 series 5.  2. Colonic diverticulosis.     SHY SALAZAR MD    I have carefully reviewed these films which does show duplication of the left collecting system with moderate hydronephrosis of the lower pole moiety which is most likely secondary to obstruction in the region of the left ureteropelvic junction.  There is no evidence of lower pole atrophy.  In addition the patient is not complaining of any flank pain  Past Medical History:   Diagnosis Date     Anxiety      Fracture of right lower leg      Macular degeneration, dry     Left Eye     Macular degeneration, wet (H)     Right Eye     Menopausal disorder     age 49     Mitral valve disorders(424.0)      Multiple sclerosis (H)      Optic neuritis 2007     Palpitations      PVD (posterior vitreous detachment), right eye      Unspecified nonsenile cataract     Right eye       Social History     Socioeconomic History     Marital status:      Spouse name: None     Number of children: 7     Years of education: None     Highest education level: None   Occupational History     Occupation: homemaker   Social Needs     Financial resource strain:  "None     Food insecurity:     Worry: None     Inability: None     Transportation needs:     Medical: None     Non-medical: None   Tobacco Use     Smoking status: Former Smoker     Packs/day: 1.00     Types: Cigarettes     Last attempt to quit: 1981     Years since quittin.2     Smokeless tobacco: Never Used   Substance and Sexual Activity     Alcohol use: No     Drug use: No     Sexual activity: Not Currently   Lifestyle     Physical activity:     Days per week: None     Minutes per session: None     Stress: None   Relationships     Social connections:     Talks on phone: None     Gets together: None     Attends Je[employer] witness service: None     Active member of club or organization: None     Attends meetings of clubs or organizations: None     Relationship status: None     Intimate partner violence:     Fear of current or ex partner: None     Emotionally abused: None     Physically abused: None     Forced sexual activity: None   Other Topics Concern      Service No     Blood Transfusions No     Comment: IVIG every 6 weeks     Caffeine Concern Yes     Comment: 7 cups of coffee per day     Occupational Exposure No     Hobby Hazards No     Sleep Concern Yes     Comment: MS - night leg spasms     Stress Concern Yes     Weight Concern No     Special Diet Yes     Comment: avoids sugar     Back Care Yes     Comment: back pain     Exercise Yes     Comment: 5 times per week - biking     Bike Helmet No     Seat Belt Yes     Self-Exams No     Parent/sibling w/ CABG, MI or angioplasty before 65F 55M? Not Asked   Social History Narrative     None       Past Surgical History:   Procedure Laterality Date     AVASTIN (BEVACIZUMAB) 1.25 MG INTRAVITREAL INJECTION OD (RIGHT EYE) Right     last 13     CATARACT IOL, RT/LT Left 10/09/2018     CATARACT IOL, RT/LT Right 2018     HC VITRECTOMY WITH FOCAL ENDOLASER PHOTOCOAGULATION  2013    \"for glaucoma\" 5 separate times LE     SURGICAL HISTORY OF -   10/05/04    " Colonoscopy     SURGICAL HISTORY OF -       Neuroma x 2        Family History   Problem Relation Age of Onset     C.A.D. Mother      Cerebrovascular Disease Mother      Macular Degeneration Mother      Rheumatologic Disease Mother      Retinal detachment Mother      C.A.D. Father      Diabetes Father      Prostate Cancer Father      Macular Degeneration Father      Glaucoma Father      Thyroid Disease Son      Hypertension No family hx of      Breast Cancer No family hx of      Cancer - colorectal No family hx of      Current Outpatient Medications:      ARTIFICIAL TEARS 0.1-0.3 % SOLN, Apply 1 drop to eye as needed, Disp: , Rfl:      ascorbic acid (VITAMIN C) 1000 MG TABS, Take 1,000 mg by mouth daily, Disp: , Rfl:      CALCIUM 500 500 MG OR TABS, Take 3 tablets by mouth, Disp: , Rfl:      Denosumab (PROLIA SC), , Disp: , Rfl:      fish oil-omega-3 fatty acids (FISH OIL) 1000 MG capsule, Take 2 capsules by mouth daily., Disp: 180 capsule, Rfl: 3     Multiple Vitamins-Minerals (PRESERVISION AREDS 2 PO), Take 2 capsules by mouth daily, Disp: , Rfl:      verapamil ER (VERELAN) 240 MG 24 hr capsule, Take 240 mg by mouth, Disp: , Rfl:      VITAMIN D, CHOLECALCIFEROL, PO, Take by mouth daily, Disp: , Rfl:      aspirin 81 MG tablet, Take 1 tablet by mouth daily, Disp: , Rfl:      BACLOFEN PO, Take 10 mg by mouth 3 times daily as needed for muscle spasms, Disp: , Rfl:      BEVACIZUMAB, AVASTIN, INJECTION 2.5MG, , Disp: , Rfl:      cyclobenzaprine (FLEXERIL) 10 MG tablet, Take 1 tablet (10 mg) by mouth nightly as needed for muscle spasms (Patient not taking: Reported on 3/11/2019), Disp: 30 tablet, Rfl: 5     gabapentin (NEURONTIN) 100 MG capsule, 300 mg daily Pt to increase dosage starting at 100mg, Disp: , Rfl:      ketorolac (ACULAR) 0.5 % ophthalmic solution, Place 1 drop Into the left eye 2 times daily, Disp: , Rfl:      prednisoLONE acetate (PRED FORTE) 1 % ophthalmic susp, Place 1-2 drops Into the left eye 2 times  "daily, Disp: , Rfl:   No current facility-administered medications for this visit.     Examination:   BP 98/60 (BP Location: Left arm, Patient Position: Sitting, Cuff Size: Adult Regular)   Pulse 62   Ht 1.588 m (5' 2.5\")   Wt 54.4 kg (120 lb)   LMP 04/01/1998   SpO2 99%   BMI 21.60 kg/m     General Impression: Very pleasant lady in no acute distress, well oriented in time place and person  Mental Status: Normal.  HEENT.  There is no clinical evidence of jaundice, the mucous membranes are normal  Skin: The skin is normal to examination  Respiratory System: The respiratory cycle is normal  Lymph Nodes: Not examined  Back/Flank Tenderness: There is no flank tenderness  Cardiovascular System: There is no significant peripheral edema  Abdominal Examination: Not examined  Extremities: The extremities are otherwise unremarkable  Genitial: Not examined  Rectal Examination: Not examined  Neurologic System: There is no evidence of central scotoma, the visual fields are normal.  Her dexterity is good, she is able to ambulate well, some balance is sometimes troublesome to her.  There is no evidence of tremor.  There is no other evidence of change since the examination last year    Impression: The patient is doing well.  She is getting occasional infections but I do not think this would warrant at this point daily prophylactic antibiotics.  I have reviewed the recent urinalyses most of which seem to be negative and it is possible the symptoms that were felt to be due to UTI may have been due to some other factor.  She is not retaining urine in the bladder so there is no reason to consider other such measures such as sterile self intermittent catheterization.  The CT scan shows that although the lower pole of the left kidney is moderately hydronephrotic with probable ureteropelvic junction obstruction at that level, there is no evidence of renal atrophy of the lower pole moiety, she is not complaining of flank pain.  In my " "opinion therefore this does not need to be indicative of any needed therapy.  Overall I am satisfied with her progress and would recommend we check her again in 1 years time with an ultrasound of the kidneys and the serum creatinine and that she contact me promptly should there be any evidence of significant problems in the interim.  I did discuss the entire situation with the patient in detail today.  I answered all the questions    Plan: I will see the patient in 1 year for an ultrasound of the kidneys and clinical examination.      Time: Complicated issue due to the effects of multiple sclerosis on number of different organ systems.  Add explanations as to the mechanism by which renal damage can occur in patients with multiple sclerosis and a discussion on how we may address this situation should this become an issue in the future.    \"This dictation was performed with voice recognition software and may contain errors,  omissions and inadvertent word substitution.\"    Again, thank you for allowing me to participate in the care of your patient.      Sincerely,    Pee Agee MD      "

## 2019-03-11 NOTE — NURSING NOTE
Chief Complaint   Patient presents with     Clinic Care Coordination - Follow-up     Pt here for annual follow-up with imaging   PVR is 41mL  Lucia Drake CMA

## 2019-03-11 NOTE — PROGRESS NOTES
History: It is a great pleasure to see this very pleasant 68-year-old lady in follow-up consultation today.  She has a history of neurogenic bladder as a result of multiple sclerosis; she has had about 2 infections in the last year, but has had no major exacerbations of multiple sclerosis during this time.  At times she can have some difficulty emptying the bladder with frequency and some nocturia.  Symptom score today was 30, although she states she is fairly satisfied, postvoid residual today was 41 cc.  Urinalysis was negative.  General medical condition is otherwise stable  The ultrasound last year showed some mild hydronephrosis of the lower pole of the left kidney although this had not changed significantly.  Renal function is been consistently excellent with the most recent, 0.62 in January of this year.  We decided to perform a CT scan today to more closely evaluate the upper urinary tract  CT ABDOMEN AND PELVIS WITHOUT AND WITH CONTRAST   3/11/2019 2:41 PM      HISTORY: Bladder disorder. MS (multiple sclerosis) (H). Neurogenic  bladder.     TECHNIQUE: CT of the abdomen and pelvis was performed without and  following the administration of 100 mL Isovue-370. Radiation dose for  this scan was reduced using automated exposure control, adjustment of  the mA and/or kV according to patient size, or iterative  reconstruction technique.     COMPARISON: None.     FINDINGS:   On the noncontrast enhanced images, there are no stones in the  kidneys.     On the postcontrast enhanced images, there appears to be a duplicated  left renal collecting system. There is moderate hydronephrosis of the  lower pole moiety. This may partly relate to a ureteropelvic junction  obstruction. No evidence for hydroureter. The left ureters are  incompletely opacified but opacified portions appear grossly  unremarkable. There is a small filling defect measuring approximately  3 mm in a calyx of the left lower pole moiety.     The opacified  portions of the intra and extrarenal collecting systems  appear grossly unremarkable.     The bladder is poorly opacified.     There are scattered colonic diverticuli. These are most concentrated  in the sigmoid colon.                                                                      IMPRESSION:   1. Duplicated left renal collecting system. Moderate hydronephrosis of  the lower pole moiety likely secondary to a ureteral pelvic junction  obstruction. Tiny nonspecific filling defect in a lower pole moiety  calyx best seen on image 30 series 5.  2. Colonic diverticulosis.     SHY SALAZAR MD    I have carefully reviewed these films which does show duplication of the left collecting system with moderate hydronephrosis of the lower pole moiety which is most likely secondary to obstruction in the region of the left ureteropelvic junction.  There is no evidence of lower pole atrophy.  In addition the patient is not complaining of any flank pain  Past Medical History:   Diagnosis Date     Anxiety      Fracture of right lower leg      Macular degeneration, dry     Left Eye     Macular degeneration, wet (H)     Right Eye     Menopausal disorder     age 49     Mitral valve disorders(424.0)      Multiple sclerosis (H)      Optic neuritis      Palpitations      PVD (posterior vitreous detachment), right eye      Unspecified nonsenile cataract     Right eye       Social History     Socioeconomic History     Marital status:      Spouse name: None     Number of children: 7     Years of education: None     Highest education level: None   Occupational History     Occupation: homemaker   Social Needs     Financial resource strain: None     Food insecurity:     Worry: None     Inability: None     Transportation needs:     Medical: None     Non-medical: None   Tobacco Use     Smoking status: Former Smoker     Packs/day: 1.00     Types: Cigarettes     Last attempt to quit: 1981     Years since quittin.2      "Smokeless tobacco: Never Used   Substance and Sexual Activity     Alcohol use: No     Drug use: No     Sexual activity: Not Currently   Lifestyle     Physical activity:     Days per week: None     Minutes per session: None     Stress: None   Relationships     Social connections:     Talks on phone: None     Gets together: None     Attends Hindu service: None     Active member of club or organization: None     Attends meetings of clubs or organizations: None     Relationship status: None     Intimate partner violence:     Fear of current or ex partner: None     Emotionally abused: None     Physically abused: None     Forced sexual activity: None   Other Topics Concern      Service No     Blood Transfusions No     Comment: IVIG every 6 weeks     Caffeine Concern Yes     Comment: 7 cups of coffee per day     Occupational Exposure No     Hobby Hazards No     Sleep Concern Yes     Comment: MS - night leg spasms     Stress Concern Yes     Weight Concern No     Special Diet Yes     Comment: avoids sugar     Back Care Yes     Comment: back pain     Exercise Yes     Comment: 5 times per week - biking     Bike Helmet No     Seat Belt Yes     Self-Exams No     Parent/sibling w/ CABG, MI or angioplasty before 65F 55M? Not Asked   Social History Narrative     None       Past Surgical History:   Procedure Laterality Date     AVASTIN (BEVACIZUMAB) 1.25 MG INTRAVITREAL INJECTION OD (RIGHT EYE) Right     last 7/9/13     CATARACT IOL, RT/LT Left 10/09/2018     CATARACT IOL, RT/LT Right 09/11/2018     HC VITRECTOMY WITH FOCAL ENDOLASER PHOTOCOAGULATION  8/8/2013    \"for glaucoma\" 5 separate times LE     SURGICAL HISTORY OF -   10/05/04    Colonoscopy     SURGICAL HISTORY OF -       Neuroma x 2        Family History   Problem Relation Age of Onset     C.A.D. Mother      Cerebrovascular Disease Mother      Macular Degeneration Mother      Rheumatologic Disease Mother      Retinal detachment Mother      C.A.D. Father      " Diabetes Father      Prostate Cancer Father      Macular Degeneration Father      Glaucoma Father      Thyroid Disease Son      Hypertension No family hx of      Breast Cancer No family hx of      Cancer - colorectal No family hx of          Current Outpatient Medications:      ARTIFICIAL TEARS 0.1-0.3 % SOLN, Apply 1 drop to eye as needed, Disp: , Rfl:      ascorbic acid (VITAMIN C) 1000 MG TABS, Take 1,000 mg by mouth daily, Disp: , Rfl:      CALCIUM 500 500 MG OR TABS, Take 3 tablets by mouth, Disp: , Rfl:      Denosumab (PROLIA SC), , Disp: , Rfl:      fish oil-omega-3 fatty acids (FISH OIL) 1000 MG capsule, Take 2 capsules by mouth daily., Disp: 180 capsule, Rfl: 3     Multiple Vitamins-Minerals (PRESERVISION AREDS 2 PO), Take 2 capsules by mouth daily, Disp: , Rfl:      verapamil ER (VERELAN) 240 MG 24 hr capsule, Take 240 mg by mouth, Disp: , Rfl:      VITAMIN D, CHOLECALCIFEROL, PO, Take by mouth daily, Disp: , Rfl:      aspirin 81 MG tablet, Take 1 tablet by mouth daily, Disp: , Rfl:      BACLOFEN PO, Take 10 mg by mouth 3 times daily as needed for muscle spasms, Disp: , Rfl:      BEVACIZUMAB, AVASTIN, INJECTION 2.5MG, , Disp: , Rfl:      cyclobenzaprine (FLEXERIL) 10 MG tablet, Take 1 tablet (10 mg) by mouth nightly as needed for muscle spasms (Patient not taking: Reported on 3/11/2019), Disp: 30 tablet, Rfl: 5     gabapentin (NEURONTIN) 100 MG capsule, 300 mg daily Pt to increase dosage starting at 100mg, Disp: , Rfl:      ketorolac (ACULAR) 0.5 % ophthalmic solution, Place 1 drop Into the left eye 2 times daily, Disp: , Rfl:      prednisoLONE acetate (PRED FORTE) 1 % ophthalmic susp, Place 1-2 drops Into the left eye 2 times daily, Disp: , Rfl:   No current facility-administered medications for this visit.     10 point ROS of systems including Constitutional, Eyes, Respiratory, Cardiovascular, Gastroenterology, Genitourinary, Integumentary, Muscularskeletal, Psychiatric were all negative except for  "pertinent positives noted in my HPI.    Examination:   BP 98/60 (BP Location: Left arm, Patient Position: Sitting, Cuff Size: Adult Regular)   Pulse 62   Ht 1.588 m (5' 2.5\")   Wt 54.4 kg (120 lb)   LMP 04/01/1998   SpO2 99%   BMI 21.60 kg/m    General Impression: Very pleasant lady in no acute distress, well oriented in time place and person  Mental Status: Normal.  HEENT.  There is no clinical evidence of jaundice, the mucous membranes are normal  Skin: The skin is normal to examination  Respiratory System: The respiratory cycle is normal  Lymph Nodes: Not examined  Back/Flank Tenderness: There is no flank tenderness  Cardiovascular System: There is no significant peripheral edema  Abdominal Examination: Not examined  Extremities: The extremities are otherwise unremarkable  Genitial: Not examined  Rectal Examination: Not examined  Neurologic System: There is no evidence of central scotoma, the visual fields are normal.  Her dexterity is good, she is able to ambulate well, some balance is sometimes troublesome to her.  There is no evidence of tremor.  There is no other evidence of change since the examination last year    Impression: The patient is doing well.  She is getting occasional infections but I do not think this would warrant at this point daily prophylactic antibiotics.  I have reviewed the recent urinalyses most of which seem to be negative and it is possible the symptoms that were felt to be due to UTI may have been due to some other factor.  She is not retaining urine in the bladder so there is no reason to consider other such measures such as sterile self intermittent catheterization.  The CT scan shows that although the lower pole of the left kidney is moderately hydronephrotic with probable ureteropelvic junction obstruction at that level, there is no evidence of renal atrophy of the lower pole moiety, she is not complaining of flank pain.  In my opinion therefore this does not need to be " "indicative of any needed therapy.  Overall I am satisfied with her progress and would recommend we check her again in 1 years time with an ultrasound of the kidneys and the serum creatinine and that she contact me promptly should there be any evidence of significant problems in the interim.  I did discuss the entire situation with the patient in detail today.  I answered all the questions    Plan: I will see the patient in 1 year for an ultrasound of the kidneys and clinical examination.      Time: Complicated issue due to the effects of multiple sclerosis on number of different organ systems.  Add explanations as to the mechanism by which renal damage can occur in patients with multiple sclerosis and a discussion on how we may address this situation should this become an issue in the future.      \"This dictation was performed with voice recognition software and may contain errors,  omissions and inadvertent word substitution.\"      "

## 2019-03-27 ENCOUNTER — OFFICE VISIT (OUTPATIENT)
Dept: OPHTHALMOLOGY | Facility: CLINIC | Age: 69
End: 2019-03-27
Attending: OPHTHALMOLOGY
Payer: MEDICARE

## 2019-03-27 DIAGNOSIS — H35.3190 NONEXUDATIVE SENILE MACULAR DEGENERATION OF RETINA: Primary | ICD-10-CM

## 2019-03-27 DIAGNOSIS — H35.3130 BILATERAL NONEXUDATIVE AGE-RELATED MACULAR DEGENERATION: ICD-10-CM

## 2019-03-27 PROCEDURE — 92134 CPTRZ OPH DX IMG PST SGM RTA: CPT | Mod: ZF | Performed by: OPHTHALMOLOGY

## 2019-03-27 PROCEDURE — G0463 HOSPITAL OUTPT CLINIC VISIT: HCPCS | Mod: ZF

## 2019-03-27 PROCEDURE — 92250 FUNDUS PHOTOGRAPHY W/I&R: CPT | Mod: ZF,59 | Performed by: OPHTHALMOLOGY

## 2019-03-27 ASSESSMENT — CUP TO DISC RATIO
OD_RATIO: 0.3
OS_RATIO: 0.4

## 2019-03-27 ASSESSMENT — EXTERNAL EXAM - LEFT EYE: OS_EXAM: NORMAL

## 2019-03-27 ASSESSMENT — VISUAL ACUITY
METHOD: SNELLEN - LINEAR
OS_CC+: -1
OD_CC: 20/20
CORRECTION_TYPE: GLASSES
OS_CC: 20/20

## 2019-03-27 ASSESSMENT — CONF VISUAL FIELD
OS_NORMAL: 1
METHOD: COUNTING FINGERS

## 2019-03-27 ASSESSMENT — TONOMETRY
OD_IOP_MMHG: 14
IOP_METHOD: TONOPEN
OS_IOP_MMHG: 14

## 2019-03-27 ASSESSMENT — EXTERNAL EXAM - RIGHT EYE: OD_EXAM: NORMAL

## 2019-03-27 ASSESSMENT — SLIT LAMP EXAM - LIDS
COMMENTS: 3+ MGD
COMMENTS: 3+ MGD

## 2019-03-27 NOTE — NURSING NOTE
Patient presents for 6-9 mo f/u of Wet Age related macular degeneration right eye; Dry left eye with Macular OCT & possible FAF. Since the last visit the vision has been stable. No pain or discomfort, no redness, severe lid itching, no tears. Increased floaters RE- f/f. Since glaucoma surgery the eye lashes have itched terribly, any tx? ATS eye drops for dryness PRN. Gemma South COT 2:24 PM March 27, 2019

## 2019-03-27 NOTE — PROGRESS NOTES
CC: follow up Age related macular degeneration     HPI:   s/p  CE/IOL right eye 9/11/18 and left eye scheduled for 10/10/18  Feels like there is a major improvement with right eye: improvements in discerning contrast, no new flashes, stable floaters.  She can still enjoy reading and denies scotomas, metamorphopsias.   Glaucoma under control  MS under control    OCT 3-27-19  Right eye: drusen Retinal pigment epithelium changes, no subretinal fluid; PHF attached, stable  Left eye: few drusen; stable. Vitreous attached    FAF 3-27-19  RE: mottled nasal macular hypo/hyperAF, stable  LE: minimal ST macular hypoAF, stable    Assessment & Plan:  1. History of Exudative Age related macular degeneration right eye    - no exudative changes    - stable    - s/p previous Avastin (x4 in 7/9/2013)   - No activity, Observe    2. mild Age related macular degeneration left eye    - AREDS vitamins, Amsler grid monitoring weekly OCT stable, no injections needed    - (+) FH: both parents had Age related macular degeneration     3. Primary open angle glaucoma (POAG) suspect with mild cup:disc asymmetry   - IOP excellent, follows with Dr. Briscoe, last visit 12/10/18    4. Optic neuritis left eye and history of Migraine aura without headache    - seen and evaluated by Dr. Hernandez in the past     6. History of multiple sclerosis diagnosed several ys ago   History of uveitis both eyes    History of taking IV IG  and steroids   currently without Treatment    7- MGD each eye   - viscous discharged expressed from Meiboman glands   - warm compression discussed   - consider a course of doxycycline if no improvement    Plan: Return to retina clinic 6-9 months, OCT and autofluorescence both eyes   Patient might be interested in conjunctiva bx stem cell research     Pedro Salinas MD, PhD  Vitreoretinal Surgery Fellow      ~~~~~~~~~~~~~~~~~~~~~~~~~~~~~~~~~~   Complete documentation of historical and exam elements from today's encounter can be found in  the full encounter summary report (not reduplicated in this progress note).  I personally obtained the chief complaint(s) and history of present illness.  I confirmed and edited as necessary the review of systems, past medical/surgical history, family history, social history, and examination findings as documented by others; and I examined the patient myself.  I personally reviewed the relevant tests, images, and reports as documented above.  I personally reviewed the ophthalmic test(s) associated with this encounter, agree with the interpretation(s) as documented by the resident/fellow, and have edited the corresponding report(s) as necessary.   I formulated and edited as necessary the assessment and plan and discussed the findings and management plan with the patient and family    Kesha Patel MD  .  Retina Service   Department of Ophthalmology and Visual Neurosciences   [facility]  Phone: [phone]   Fax: [phone]

## 2019-06-17 ENCOUNTER — OFFICE VISIT (OUTPATIENT)
Dept: OPHTHALMOLOGY | Facility: CLINIC | Age: 69
End: 2019-06-17
Attending: OPHTHALMOLOGY
Payer: MEDICARE

## 2019-06-17 DIAGNOSIS — H25.13 NUCLEAR SENILE CATARACT OF BOTH EYES: Primary | ICD-10-CM

## 2019-06-17 DIAGNOSIS — H40.003 GLAUCOMA SUSPECT OF BOTH EYES: ICD-10-CM

## 2019-06-17 PROBLEM — R29.898 RIGHT LEG WEAKNESS: Status: ACTIVE | Noted: 2019-01-04

## 2019-06-17 PROBLEM — R20.0 NUMBNESS AND TINGLING OF RIGHT LEG: Status: ACTIVE | Noted: 2019-01-04

## 2019-06-17 PROBLEM — M51.369 LUMBAR DEGENERATIVE DISC DISEASE: Status: ACTIVE | Noted: 2019-01-11

## 2019-06-17 PROBLEM — I47.10 SUPRAVENTRICULAR TACHYCARDIA, PAROXYSMAL (H): Status: ACTIVE | Noted: 2019-01-21

## 2019-06-17 PROBLEM — N95.2 VAGINAL ATROPHY: Status: ACTIVE | Noted: 2019-03-26

## 2019-06-17 PROBLEM — R00.0 SYMPTOMATIC TACHYCARDIA: Status: ACTIVE | Noted: 2019-01-21

## 2019-06-17 PROBLEM — Z71.89 ACP (ADVANCE CARE PLANNING): Status: ACTIVE | Noted: 2017-10-20

## 2019-06-17 PROBLEM — M51.26 HERNIATION OF INTERVERTEBRAL DISC OF LUMBAR REGION: Status: ACTIVE | Noted: 2019-01-11

## 2019-06-17 PROBLEM — R20.2 NUMBNESS AND TINGLING OF RIGHT LEG: Status: ACTIVE | Noted: 2019-01-04

## 2019-06-17 PROBLEM — M79.604 RIGHT LEG PAIN: Status: ACTIVE | Noted: 2019-01-04

## 2019-06-17 PROBLEM — M47.26 OSTEOARTHRITIS OF SPINE WITH RADICULOPATHY, LUMBAR REGION: Status: ACTIVE | Noted: 2019-01-11

## 2019-06-17 PROBLEM — M54.41 ACUTE RIGHT-SIDED LOW BACK PAIN WITH RIGHT-SIDED SCIATICA: Status: ACTIVE | Noted: 2019-01-11

## 2019-06-17 PROCEDURE — 92133 CPTRZD OPH DX IMG PST SGM ON: CPT | Mod: ZF | Performed by: OPHTHALMOLOGY

## 2019-06-17 PROCEDURE — G0463 HOSPITAL OUTPT CLINIC VISIT: HCPCS | Mod: ZF

## 2019-06-17 PROCEDURE — 92083 EXTENDED VISUAL FIELD XM: CPT | Mod: ZF | Performed by: OPHTHALMOLOGY

## 2019-06-17 ASSESSMENT — VISUAL ACUITY
OD_CC: 20/25+2
CORRECTION_TYPE: GLASSES
METHOD: SNELLEN - LINEAR
OS_CC: 20/25+2

## 2019-06-17 ASSESSMENT — REFRACTION_WEARINGRX
OS_AXIS: 180
OD_ADD: +2.75
OS_CYLINDER: +1.75
OS_SPHERE: -0.50
OD_AXIS: 005
OS_ADD: +2.75
OD_SPHERE: -0.25
OD_CYLINDER: +1.50

## 2019-06-17 ASSESSMENT — CONF VISUAL FIELD
OD_NORMAL: 1
OS_NORMAL: 1
METHOD: COUNTING FINGERS

## 2019-06-17 ASSESSMENT — SLIT LAMP EXAM - LIDS
COMMENTS: NORMAL
COMMENTS: NORMAL

## 2019-06-17 ASSESSMENT — EXTERNAL EXAM - LEFT EYE: OS_EXAM: NORMAL

## 2019-06-17 ASSESSMENT — TONOMETRY
OS_IOP_MMHG: 14
IOP_METHOD: APPLANATION
OD_IOP_MMHG: 12

## 2019-06-17 ASSESSMENT — EXTERNAL EXAM - RIGHT EYE: OD_EXAM: NORMAL

## 2019-06-17 NOTE — PROGRESS NOTES
CC:  History of suspect glaucoma s/p SLT (or ALT) both eyes, presenting for follow up.    HPI:   Light sensitivity improved almost immediately after stopping drops. Feeling significantly better, vision is better. Some residual aching in left eye socket. Intermittent, improved. Saw Dr. Frausto/ Dr. Hernandez in neuroophthalmology clinic, thought highly unlikely to be optic neuritis. Did not get an MRI, but would recommend if continues to worsen.    Cataract extraction/IOL 10/9/18 left eye  Cataract extraction with intraocular lens 9/11/18 right eye      Current Medications:   stopped 11/19/18 stopped 11/19/18  PF Artificial tears four times a day both eyes     Testing Nov 2018  Color plates 11/11 right eye and 11/11 left eye 11/19/18  11/11 right eye and 0/11 left eye (11/7/18)    Assessment  Medicamentosa with decreased vision and pain-resolved   -Continue artificial tears     History of optic neuritis and MS   Dr. Eliana Durant is her neurologist   Saw Dr Hernandez 4/8/14 for ocular migraine, could see 11/11 color plates both eyes at that visit both eyes    Saw Dr. Frausto 11/7, staffed with Dr. Hernandez, low suspicion for recurrence of optic neuritis given 11/11 color plates, APD that was more likely chronic    Pseudophakia OU    Primary open angle glaucoma (POAG) suspect with mild cup:disc asymmetry   GTOP stable right eye and variable left eye     Scatter on visual field left eye but also has diagnosis of optic neuritis left eye   OCT stable both eyes    Not on glaucoma drops currently with excellent intraocular pressure    S/p Selected laser trabeculoplasty (SLT) or Argon laser trabeculoplasty both eyes     History of narrow angle, now pseudophakic    Migraine aura without headache    Now with brief aura (few seconds) fairly frequently   Seen by Dr Hernandez in past    Age related macular degeneration    Seen by Dr. Patel   History of Wet Age related macular degeneration right eye; Dry left eye      Avastin x 4 right eye (most  recent 7/9/2013)    Plan:    Continue artificial tears Four times a day both eyes  Return to clinic 1 year for OCT retinal nerve fiber layer and Octopus visual field GTOP    Attending Physician Attestation:  Complete documentation of historical and exam elements from today's encounter can be found in the full encounter summary report (not reduplicated in this progress note). I personally obtained the chief complaint(s) and history of present illness. I confirmed and edited asnecessary the review of systems, past medical/surgical history, family history, social history, and examination findings as documented by others; and I examined the patient myself. I personally reviewed the relevant tests, images, and reports as documented above. I formulated and edited as necessary the assessment and plan and discussed the findings and management plan with the patient and family.  - Nanci Briscoe MD 2:24 PM 6/17/2019

## 2019-07-11 NOTE — PROGRESS NOTES
CC: follow up Age related macular degeneration     HPI: no change from last visit. Vision is worse at near. Still notes persistent shimmering lights right eye  especially when exercising. Has previously been diagnosed with Ocular Migraines and has seen Dr. Hernandez and is followed by Dr. Ames. Symptoms have been different as of late. No pain, irritation, discomfort or changes in floaters.    OCT 7-20-17  Right eye: drusen Retinal pigment epithelium changes, no subretinal fluid; stable  Left eye: few drusen; stable    FAF 7-20-17  RE: mottled nasal macular hypo/hyperAF, stable  LE: minimal ST macular hypoAF, stable    Assessment & Plan:  1. History of Exudative Age related macular degeneration right eye     - s/p previous Avastin (x4 in 7/9/2013)   - No activity   - Observe    2. mild Age related macular degeneration left eye    - AREDS vitamins, Amsler grid monitoring weekly OCT stable, no injections needed    - Continue to Monitor    3. Posterior vitreous detachment (PVD) both eyes    - Retina detachment precautions were discussed with the patient (presence or increased in flashes, floaters or a curtain in the visual field) and was asked to return if any of the those occur    4. Primary open angle glaucoma (POAG) suspect with mild cup:disc asymmetry  - Mildly abnormal visual field left eye in April  - IOP and exam stable, not on glaucoma drops currently  - S/p Selected laser trabeculoplasty (SLT) or Argon laser trabeculoplasty both eyes   - Narrow angle right eye with plateau configuation but both angles open.  - Followed by Dr. Briscoe    4. Optic neuritis left eye and history of Migraine aura without headache   - f/b Dr Hernandez. History of multiple sclerosis     5. Cataracts both eyes - observe for now    6. History of multiple sclerosis diagnosed several ys ago  History of uveitis both eyes   History of taken IV IG  And steroids  currently without  Treatment    Return to clinic 6-9 months, OCT and autofluorescence  Size Of Lesion In Cm (Optional): 0 Detail Level: Detailed both eyes     Pedro Salinas MD, PhD  Vitreoretinal Surgery Fellow      ~~~~~~~~~~~~~~~~~~~~~~~~~~~~~~~~~~   Complete documentation of historical and exam elements from today's encounter can be found in the full encounter summary report (not reduplicated in this progress note).  I personally obtained the chief complaint(s) and history of present illness.  I confirmed and edited as necessary the review of systems, past medical/surgical history, family history, social history, and examination findings as documented by others; and I examined the patient myself.  I personally reviewed the relevant tests, images, and reports as documented above.  I formulated and edited as necessary the assessment and plan and discussed the findings and management plan with the patient and family    Kesha Patel MD  .  Retina Service   Department of Ophthalmology and Visual Neurosciences   [facility]  Phone: [phone]   Fax: [phone]          Morphology Per Location (Optional): EXC:01/19

## 2019-09-23 DIAGNOSIS — H35.3131 EARLY DRY STAGE NONEXUDATIVE AGE-RELATED MACULAR DEGENERATION OF BOTH EYES: Primary | ICD-10-CM

## 2019-09-26 ENCOUNTER — OFFICE VISIT (OUTPATIENT)
Dept: OPHTHALMOLOGY | Facility: CLINIC | Age: 69
End: 2019-09-26
Attending: OPHTHALMOLOGY
Payer: MEDICARE

## 2019-09-26 DIAGNOSIS — H35.3131 EARLY DRY STAGE NONEXUDATIVE AGE-RELATED MACULAR DEGENERATION OF BOTH EYES: ICD-10-CM

## 2019-09-26 PROCEDURE — G0463 HOSPITAL OUTPT CLINIC VISIT: HCPCS | Mod: ZF

## 2019-09-26 PROCEDURE — 92134 CPTRZ OPH DX IMG PST SGM RTA: CPT | Mod: ZF | Performed by: OPHTHALMOLOGY

## 2019-09-26 PROCEDURE — 92250 FUNDUS PHOTOGRAPHY W/I&R: CPT | Mod: ZF,59 | Performed by: OPHTHALMOLOGY

## 2019-09-26 ASSESSMENT — VISUAL ACUITY
CORRECTION_TYPE: GLASSES
OD_CC: 20/25
OD_CC+: +2
METHOD: SNELLEN - LINEAR
OS_CC: 20/20
OS_CC+: -2

## 2019-09-26 ASSESSMENT — CONF VISUAL FIELD
METHOD: COUNTING FINGERS
OS_NORMAL: 1
OD_NORMAL: 1

## 2019-09-26 ASSESSMENT — REFRACTION_WEARINGRX
OD_SPHERE: -0.25
OD_CYLINDER: +1.50
OD_AXIS: 005
OS_SPHERE: -0.50
OS_ADD: +2.75
OD_ADD: +2.75
OS_AXIS: 180
OS_CYLINDER: +1.75

## 2019-09-26 ASSESSMENT — SLIT LAMP EXAM - LIDS
COMMENTS: 3+ MGD
COMMENTS: 3+ MGD

## 2019-09-26 ASSESSMENT — TONOMETRY
OS_IOP_MMHG: 18
OD_IOP_MMHG: 17
IOP_METHOD: TONOPEN

## 2019-09-26 ASSESSMENT — EXTERNAL EXAM - LEFT EYE: OS_EXAM: NORMAL

## 2019-09-26 ASSESSMENT — CUP TO DISC RATIO
OD_RATIO: 0.3
OS_RATIO: 0.4

## 2019-09-26 ASSESSMENT — EXTERNAL EXAM - RIGHT EYE: OD_EXAM: NORMAL

## 2019-09-26 NOTE — PROGRESS NOTES
CC: follow up Age related macular degeneration     HPI:   s/p  CE/IOL right eye 9/11/18 and left eye scheduled for 10/10/18  Feels like there is a major improvement with right eye: improvements in discerning contrast, no new flashes, stable floaters.  She can still enjoy reading and denies scotomas, metamorphopsias.   Glaucoma under control  MS under control    Interval Hx: Stable vision. Continues to have migraine w/ visual aura's almost every night before. Occasional headaches       OCT 9-26-19  Right eye: drusen Retinal pigment epithelium changes, no subretinal fluid; PHF attached, stable  Left eye: few drusen; stable. PHF attached    FAF 9-26-19  RE: mottled nasal macular hypo/hyperAF, stable  LE: minimal ST macular hypoAF, stable    Assessment & Plan:  1. History of Exudative Age related macular degeneration right eye    - no exudative changes    - stable    - s/p previous Avastin (x4 in 7/9/2013)   - No activity, Observe    2. mild Age related macular degeneration left eye    - AREDS vitamins, Amsler grid monitoring weekly OCT stable, no injections needed    - (+) FH: both parents had Age related macular degeneration     3. Primary open angle glaucoma (POAG) suspect with mild cup:disc asymmetry   - IOP excellent, follows with Dr. Briscoe, last visit 6/2019 - no changes     4. Optic neuritis left eye and history of Migraine aura without headache    - seen and evaluated by Dr. Hernandez in the past     6. History of multiple sclerosis diagnosed several ys ago   History of uveitis both eyes    History of taking IV IG  and steroids   currently without Treatment    7- MGD each eye   - viscous discharged expressed from Meiboman glands   - warm compression discussed   - consider a course of doxycycline if no improvement    Plan: Return to retina clinic 6-9 months, OCT and autofluorescence both eyes   Patient not interested in conjunctiva bx stem cell research       Francisco Bales MD  Ophthalmology Resident  PGY-3        ~~~~~~~~~~~~~~~~~~~~~~~~~~~~~~~~~~   Complete documentation of historical and exam elements from today's encounter can be found in the full encounter summary report (not reduplicated in this progress note).  I personally obtained the chief complaint(s) and history of present illness.  I confirmed and edited as necessary the review of systems, past medical/surgical history, family history, social history, and examination findings as documented by others; and I examined the patient myself.  I personally reviewed the relevant tests, images, and reports as documented above.  I personally reviewed the ophthalmic test(s) associated with this encounter, agree with the interpretation(s) as documented by the resident/fellow, and have edited the corresponding report(s) as necessary.   I formulated and edited as necessary the assessment and plan and discussed the findings and management plan with the patient and family    Kesha Patel MD  .  Retina Service   Department of Ophthalmology and Visual Neurosciences   [facility]  Phone: [phone]   Fax: [phone]

## 2019-09-26 NOTE — NURSING NOTE
Chief Complaints and History of Present Illnesses   Patient presents with     Follow Up     6 month follow up History of Exudative Age related macular degeneration right eye      Chief Complaint(s) and History of Present Illness(es)     Follow Up     Comments: 6 month follow up History of Exudative Age related macular degeneration right eye               Comments     Pt states vision has been good since last visit. No eye pain today.   No flashes or floaters. No redness. Dryness in both eyes, relief with drops.    AVANI Lew  September 26, 2019 1:37 PM

## 2020-04-13 DIAGNOSIS — H35.3130 BILATERAL NONEXUDATIVE AGE-RELATED MACULAR DEGENERATION, UNSPECIFIED STAGE: Primary | ICD-10-CM

## 2020-09-11 DIAGNOSIS — H35.3130 BILATERAL NONEXUDATIVE AGE-RELATED MACULAR DEGENERATION, UNSPECIFIED STAGE: Primary | ICD-10-CM

## 2020-10-08 ENCOUNTER — OFFICE VISIT (OUTPATIENT)
Dept: OPHTHALMOLOGY | Facility: CLINIC | Age: 70
End: 2020-10-08
Attending: OPHTHALMOLOGY
Payer: MEDICARE

## 2020-10-08 DIAGNOSIS — H35.3130 BILATERAL NONEXUDATIVE AGE-RELATED MACULAR DEGENERATION, UNSPECIFIED STAGE: ICD-10-CM

## 2020-10-08 PROCEDURE — G0463 HOSPITAL OUTPT CLINIC VISIT: HCPCS

## 2020-10-08 PROCEDURE — 99207 FUNDUS AUTOFLUORESCENCE IMAGE (FAF) OU (BOTH EYES): CPT | Performed by: OPHTHALMOLOGY

## 2020-10-08 PROCEDURE — 92014 COMPRE OPH EXAM EST PT 1/>: CPT | Performed by: OPHTHALMOLOGY

## 2020-10-08 PROCEDURE — 92134 CPTRZ OPH DX IMG PST SGM RTA: CPT | Performed by: OPHTHALMOLOGY

## 2020-10-08 PROCEDURE — 92250 FUNDUS PHOTOGRAPHY W/I&R: CPT | Performed by: OPHTHALMOLOGY

## 2020-10-08 RX ORDER — BETHANECHOL CHLORIDE 25 MG/1
TABLET ORAL
COMMUNITY
Start: 2020-07-24

## 2020-10-08 ASSESSMENT — VISUAL ACUITY
METHOD: SNELLEN - LINEAR
OD_CC+: -2
OS_CC: 20/20
OS_CC+: -3
CORRECTION_TYPE: GLASSES
OD_CC: 20/20

## 2020-10-08 ASSESSMENT — CONF VISUAL FIELD
METHOD: COUNTING FINGERS
OS_NORMAL: 1
OD_NORMAL: 1

## 2020-10-08 ASSESSMENT — REFRACTION_WEARINGRX
OS_ADD: +2.75
OS_CYLINDER: +1.75
OD_ADD: +2.75
OS_SPHERE: -0.50
OD_SPHERE: -0.25
OS_AXIS: 180
OD_CYLINDER: +1.50
OD_AXIS: 005

## 2020-10-08 ASSESSMENT — CUP TO DISC RATIO
OS_RATIO: 0.4
OD_RATIO: 0.3

## 2020-10-08 ASSESSMENT — EXTERNAL EXAM - LEFT EYE: OS_EXAM: NORMAL

## 2020-10-08 ASSESSMENT — EXTERNAL EXAM - RIGHT EYE: OD_EXAM: NORMAL

## 2020-10-08 ASSESSMENT — SLIT LAMP EXAM - LIDS
COMMENTS: 3+ MGD
COMMENTS: 3+ MGD

## 2020-10-08 ASSESSMENT — TONOMETRY
OD_IOP_MMHG: 16
OS_IOP_MMHG: 19
IOP_METHOD: TONOPEN

## 2020-10-08 NOTE — PROGRESS NOTES
CC: follow up Age related macular degeneration     HPI:   s/p  CE/IOL right eye 9/11/18 and left eye 10/10/18  Feels like there is a major improvement with right eye: improvements in discerning contrast, no new flashes, stable floaters.  She can still enjoy reading and denies scotomas, metamorphopsias.   Glaucoma under control  MS under control    Interval Hx: Stable vision. Reports that for the past 3 weeks has periocular pain left eye, pain when moving the eyes. occ migraine w/ visual aura's almost every night before. Occasional headaches       OCT 10-8-20  Right eye: drusen Retinal pigment epithelium changes, no subretinal fluid; PHF attached, stable  Left eye: few drusen; stable. PHF attached    FAF 10-8-20  RE: mottled nasal macular hypo/hyperAF, stable  LE: minimal ST macular hypoAF, stable    Assessment & Plan:  1. History of Exudative Age related macular degeneration right eye   Possible pattern dystrophy   - no exudative changes    - stable    - s/p previous Avastin (x4 in 7/9/2013)   - No activity, Observe    2. mild Age related macular degeneration left eye    - AREDS vitamins, Amsler grid monitoring weekly OCT stable, no injections needed    - (+) FH: both parents had Age related macular degeneration     3. Primary open angle glaucoma (POAG) suspect with mild cup:disc asymmetry   - IOP excellent    4. Optic neuritis left eye and history of Migraine aura without headache    - seen and evaluated by Dr. Hernandez in the past  New periocular pain left eye   Patient now with periocular pain left eye x 3 weeks, pain when moving the eyes.  Per tech note trace afferent pupillary defect?  Pupil dilated upon my examination  No intraocular inflammation  Color vision: 11/11 both eyes   Recommend to follow up with Dr. Hernandez next available     6. 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 Optical Coherence Tomography   Patient not interested in conjunctiva bx stem cell research in the future        ~~~~~~~~~~~~~~~~~~~~~~~~~~~~~~~~~~   Complete documentation of historical and exam elements from today's encounter can be found in the full encounter summary report (not reduplicated in this progress note).  I personally obtained the chief complaint(s) and history of present illness.  I confirmed and edited as necessary the review of systems, past medical/surgical history, family history, social history, and examination findings as documented by others; and I examined the patient myself.  I personally reviewed the relevant tests, images, and reports as documented above.  I formulated and edited as necessary the assessment and plan and discussed the findings and management plan with the patient and family    Kesha Patel MD  .  Retina Service   Department of Ophthalmology and Visual Neurosciences   [facility]  Phone: [phone]   Fax: [phone]

## 2020-10-08 NOTE — NURSING NOTE
Chief Complaints and History of Present Illnesses   Patient presents with     Follow Up     Nonexudative age-related macular degeneration     Chief Complaint(s) and History of Present Illness(es)     Follow Up     Laterality: both eyes    Course: stable    Associated symptoms: photophobia and eye pain (left eye).  Negative for redness and tearing    Treatments tried: artificial tears    Pain scale: 3/10    Comments: Nonexudative age-related macular degeneration              Comments     She states that she has been having aching around her left eye socket for the past week, which seems to be resolving.  She has also been light sensitive for two plus weeks.  Her vision has seemed stable in both eyes since her last exam.    She is using Refresh drops 2-4 times a day in both eyes.    Eliana Marti, COT 2:44 PM  October 8, 2020

## 2020-11-05 ENCOUNTER — OFFICE VISIT (OUTPATIENT)
Dept: OPHTHALMOLOGY | Facility: CLINIC | Age: 70
End: 2020-11-05
Attending: OPHTHALMOLOGY
Payer: MEDICARE

## 2020-11-05 DIAGNOSIS — Z86.69 HISTORY OF OPTIC NEURITIS: Primary | ICD-10-CM

## 2020-11-05 DIAGNOSIS — H53.40 VISUAL FIELD DEFECT: Primary | ICD-10-CM

## 2020-11-05 DIAGNOSIS — H57.11 PAIN IN OR AROUND EYE, RIGHT: ICD-10-CM

## 2020-11-05 DIAGNOSIS — H53.40 VISUAL FIELD DEFECT: ICD-10-CM

## 2020-11-05 PROCEDURE — 92133 CPTRZD OPH DX IMG PST SGM ON: CPT | Performed by: OPHTHALMOLOGY

## 2020-11-05 PROCEDURE — 99213 OFFICE O/P EST LOW 20 MIN: CPT | Mod: GC | Performed by: OPHTHALMOLOGY

## 2020-11-05 PROCEDURE — 92083 EXTENDED VISUAL FIELD XM: CPT | Performed by: OPHTHALMOLOGY

## 2020-11-05 PROCEDURE — G0463 HOSPITAL OUTPT CLINIC VISIT: HCPCS

## 2020-11-05 ASSESSMENT — EXTERNAL EXAM - LEFT EYE: OS_EXAM: NORMAL

## 2020-11-05 ASSESSMENT — CONF VISUAL FIELD
OD_NORMAL: 1
OS_NORMAL: 1

## 2020-11-05 ASSESSMENT — VISUAL ACUITY
OS_CC: 20/20
OD_CC: 20/20
METHOD: SNELLEN - LINEAR

## 2020-11-05 ASSESSMENT — REFRACTION_WEARINGRX
OS_CYLINDER: +1.75
OS_ADD: +2.75
OD_ADD: +2.75
OD_CYLINDER: +1.50
OD_SPHERE: -0.25
OD_AXIS: 005
OS_SPHERE: -0.50
OS_AXIS: 180

## 2020-11-05 ASSESSMENT — TONOMETRY
OS_IOP_MMHG: 19
OD_IOP_MMHG: 18
IOP_METHOD: ICARE

## 2020-11-05 ASSESSMENT — CUP TO DISC RATIO
OD_RATIO: 0.3
OS_RATIO: 0.4

## 2020-11-05 ASSESSMENT — EXTERNAL EXAM - RIGHT EYE: OD_EXAM: NORMAL

## 2020-11-05 ASSESSMENT — SLIT LAMP EXAM - LIDS
COMMENTS: MGD
COMMENTS: MGD

## 2020-11-05 NOTE — NURSING NOTE
Chief Complaints and History of Present Illnesses   Patient presents with     Blurred Vision Follow-Up     Chief Complaint(s) and History of Present Illness(es)     Blurred Vision Follow-Up               Comments     Sondra Castro is a 70 year old female who presents today for     Optic neuritis left eye and history of Migraine aura without headache   No vision changes since last visit.     Laquita LEBRON 2:03 PM November 5, 2020

## 2020-11-05 NOTE — PROGRESS NOTES
Assessment & Plan     Sondra Castro is a 70 year old female with the following diagnoses:   1. History of optic neuritis    2. Visual field defect    3. Pain in or around eye, right         Sondra Castro is a 70 year old White female who presents to neuro-ophthalmology clinic today for consultation from Dr. Patel for left eye pain. Patient was previously seen in 2014 in neuro-ophthalmology clinic for acephalgic migraines and optic neuritis. Optic neuritis was many years ago in the setting of multiple sclerosis. She was referred for left eye pain. She reports photophobia for a week as well as pain with eye movement about 6 weeks ago. She reports improved but persistent pain with eye movements. She denies diplopia or decreased vision. She was previously on IVIG for multiple sclerosis for 30 years (stopped about 8 years ago). She is followed at [facility] for Multiple sclerosis    POH: macular degeneration s/p avastin injections, Glaucoma, history of uveitis, dry eyes, s/p cataract surgery, ocular migraines  PMH: multiple sclerosis, osteoporosis  FH: diabetic retinopathy, macular degeneration- both parents and grandmother lost vision  Meds: Bethanechol, Prolia, gabapentin PRN, AREDs, Verapamil     Visual acuity is 20/20 each eye. Intraocular pressure is 18 and 19. Pupils are reactive without afferent pupillary defect. Color plates, Confrontational visual fields and Motility are full. Slit lamp exam is unremarkable . Dilated fundus exam shows trace pallor in left nerve; there is drusen in macula in both eyes    Visual fields is normal in right eye; left eye has inferior arcuate defect and nasal step. OCT rNFL shows normal right nerve and atrophy superiorly in the left eye.    It is my impression that patient has no sign of optic neuritis on exam today. However, it is possible she could have had optic neuritis about 6 weeks ago.  It is too late to treat this.  It is also too late to obtain an MRI to look for  optic neuritis.  We can repeat RNFL in 3 month to confirm if treatment plan for multiple sclerosis changes, but patient is not interested in restarting treatment at this time.  Therefore, she does not wish to have a repeat OCT in 3 months time.  I think this is reasonable.  Follow up as needed         Attending Physician Attestation:  Complete documentation of historical and exam elements from today's encounter can be found in the full encounter summary report (not reduplicated in this progress note).  I personally obtained the chief complaint(s) and history of present illness.  I confirmed and edited as necessary the review of systems, past medical/surgical history, family history, social history, and examination findings as documented by others; and I examined the patient myself.  I personally reviewed the relevant tests, images, and reports as documented above.  I formulated and edited as necessary the assessment and plan and discussed the findings and management plan with the patient and family. I personally reviewed the ophthalmic test(s) associated with this encounter, agree with the interpretation(s) as documented by the resident/fellow, and have edited the corresponding report(s) as necessary.  - Bradley Darden MD  Ophthalmology PGY-3  [facility]

## 2020-11-05 NOTE — LETTER
2020         RE:  :  MRN: Sondra Castro  1950  [id number]     Dear Dr. Patel,    Thank you for asking me to see your very pleasant patient, Sondra Castro, in neuro-ophthalmic consultation. I would like to thank you for sending your records and I have summarized them in the history of present illness. My assessment and plan are below. For further details, please see my attached clinic note.      Assessment & Plan     Sondra Castro is a 70 year old female with the following diagnoses:   1. History of optic neuritis    2. Visual field defect    3. Pain in or around eye, right         Sondra Castro is a 70 year old White female who presents to neuro-ophthalmology clinic today for consultation from Dr. Patel for left eye pain. Patient was previously seen in  in neuro-ophthalmology clinic for acephalgic migraines and optic neuritis. Optic neuritis was many years ago in the setting of multiple sclerosis. She was referred for left eye pain. She reports photophobia for a week as well as pain with eye movement about 6 weeks ago. She reports improved but persistent pain with eye movements. She denies diplopia or decreased vision. She was previously on IVIG for multiple sclerosis for 30 years (stopped about 8 years ago). She is followed at [facility] for Multiple sclerosis    POH: macular degeneration s/p avastin injections, Glaucoma, history of uveitis, dry eyes, s/p cataract surgery, ocular migraines  PMH: multiple sclerosis, osteoporosis  FH: diabetic retinopathy, macular degeneration- both parents and grandmother lost vision  Meds: Bethanechol, Prolia, gabapentin PRN, AREDs, Verapamil     Visual acuity is 20/20 each eye. Intraocular pressure is 18 and 19. Pupils are reactive without afferent pupillary defect. Color plates, Confrontational visual fields and Motility are full. Slit lamp exam is unremarkable . Dilated fundus exam shows trace pallor in left nerve; there is drusen in macula in both  eyes    Visual fields is normal in right eye; left eye has inferior arcuate defect and nasal step. OCT rNFL shows normal right nerve and atrophy superiorly in the left eye.  It is my impression that patient has no sign of optic neuritis on exam today. However, it is possible she could have had optic neuritis about 6 weeks ago.  It is too late to treat this.  It is also too late to obtain an MRI to look for optic neuritis.  We can repeat RNFL in 3 month to confirm if treatment plan for multiple sclerosis changes, but patient is not interested in restarting treatment at this time.  Therefore, she does not wish to have a repeat OCT in 3 months time.  I think this is reasonable.  Follow up as needed      Again, thank you for allowing me to participate in the care of your patient.      Sincerely,    Bradley Hernandez MD  Professor  Ophthalmology Residency   Director of Neuro-Ophthalmology  [facility] Endowed Chair  Departments of Ophthalmology, Neurology, and Neurosurgery  [address]  [address]  T - [phone]  F - [phone]  [email or web address]      CC: Kesha Patel MD  [address] [place] [id number]  Via In Basket

## 2020-11-05 NOTE — Clinical Note
11/5/2020       RE: Sondra Castro  [address]  [place] [address]     Dear Colleague,    Thank you for referring your patient, Sondra Castro, to the [facility] at [facility]. Please see a copy of my visit note below.         Assessment & Plan     Sondra Castro is a 70 year old female with the following diagnoses:   1. History of optic neuritis    2. Visual field defect    3. Pain in or around eye, right         Sondra Castro is a 70 year old White female who presents to neuro-ophthalmology clinic today for consultation from Dr. Patel for left eye pain. Patient was previously seen in 2014 in neuro-ophthalmology clinic for acephalgic migraines and optic neuritis. Optic neuritis was many years ago in the setting of multiple sclerosis. She was referred for left eye pain. She reports photophobia for a week as well as pain with eye movement about 6 weeks ago. She reports improved but persistent pain with eye movements. She denies diplopia or decreased vision. She was previously on IVIG for multiple sclerosis for 30 years (stopped about 8 years ago). She is followed at [facility] for Multiple sclerosis    POH: macular degeneration s/p avastin injections, Glaucoma, history of uveitis, dry eyes, s/p cataract surgery, ocular migraines  PMH: multiple sclerosis, osteoporosis  FH: diabetic retinopathy, macular degeneration- both parents and grandmother lost vision  Meds: Bethanechol, Prolia, gabapentin PRN, AREDs, Verapamil     Visual acuity is 20/20 each eye. Intraocular pressure is 18 and 19. Pupils are reactive without afferent pupillary defect. Color plates, Confrontational visual fields and Motility are full. Slit lamp exam is unremarkable . Dilated fundus exam shows trace pallor in left nerve; there is drusen in macula in both eyes    Visual fields is normal in right eye; left eye has inferior arcuate defect and nasal step. OCT rNFL shows normal right nerve  and atrophy superiorly in the left eye.    It is my impression that patient has no sign of optic neuritis on exam today. However, it is possible she could have had optic neuritis about 6 weeks ago.  It is too late to treat this.  It is also too late to obtain an MRI to look for optic neuritis.  We can repeat RNFL in 3 month to confirm if treatment plan for multiple sclerosis changes, but patient is not interested in restarting treatment at this time.  Therefore, she does not wish to have a repeat OCT in 3 months time.  I think this is reasonable.  Follow up as needed         Attending Physician Attestation:  Complete documentation of historical and exam elements from today's encounter can be found in the full encounter summary report (not reduplicated in this progress note).  I personally obtained the chief complaint(s) and history of present illness.  I confirmed and edited as necessary the review of systems, past medical/surgical history, family history, social history, and examination findings as documented by others; and I examined the patient myself.  I personally reviewed the relevant tests, images, and reports as documented above.  I formulated and edited as necessary the assessment and plan and discussed the findings and management plan with the patient and family. I personally reviewed the ophthalmic test(s) associated with this encounter, agree with the interpretation(s) as documented by the resident/fellow, and have edited the corresponding report(s) as necessary.  - Bradley Darden MD  Ophthalmology PGY-3  [facility]      Again, thank you for allowing me to participate in the care of your patient.      Sincerely,    Bradley Hernandez MD

## 2021-06-21 DIAGNOSIS — H35.3130 BILATERAL NONEXUDATIVE AGE-RELATED MACULAR DEGENERATION, UNSPECIFIED STAGE: Primary | ICD-10-CM

## 2021-07-01 ENCOUNTER — OFFICE VISIT (OUTPATIENT)
Dept: OPHTHALMOLOGY | Facility: CLINIC | Age: 71
End: 2021-07-01
Attending: OPHTHALMOLOGY
Payer: MEDICARE

## 2021-07-01 DIAGNOSIS — H53.40 VISUAL FIELD DEFECT: Primary | ICD-10-CM

## 2021-07-01 DIAGNOSIS — H35.3130 BILATERAL NONEXUDATIVE AGE-RELATED MACULAR DEGENERATION, UNSPECIFIED STAGE: ICD-10-CM

## 2021-07-01 DIAGNOSIS — Z96.1 PSEUDOPHAKIA OF BOTH EYES: ICD-10-CM

## 2021-07-01 DIAGNOSIS — H35.3210 EXUDATIVE AGE-RELATED MACULAR DEGENERATION OF RIGHT EYE, UNSPECIFIED STAGE (H): Primary | ICD-10-CM

## 2021-07-01 DIAGNOSIS — H53.40 VISUAL FIELD DEFECT: ICD-10-CM

## 2021-07-01 PROCEDURE — 99213 OFFICE O/P EST LOW 20 MIN: CPT | Mod: GC | Performed by: OPHTHALMOLOGY

## 2021-07-01 PROCEDURE — G0463 HOSPITAL OUTPT CLINIC VISIT: HCPCS

## 2021-07-01 PROCEDURE — 92083 EXTENDED VISUAL FIELD XM: CPT | Performed by: OPHTHALMOLOGY

## 2021-07-01 PROCEDURE — 92134 CPTRZ OPH DX IMG PST SGM RTA: CPT | Performed by: OPHTHALMOLOGY

## 2021-07-01 PROCEDURE — 92250 FUNDUS PHOTOGRAPHY W/I&R: CPT | Performed by: OPHTHALMOLOGY

## 2021-07-01 PROCEDURE — 99207 FUNDUS AUTOFLUORESCENCE IMAGE (FAF) OU (BOTH EYES): CPT | Mod: 26 | Performed by: OPHTHALMOLOGY

## 2021-07-01 PROCEDURE — 92133 CPTRZD OPH DX IMG PST SGM ON: CPT | Performed by: OPHTHALMOLOGY

## 2021-07-01 ASSESSMENT — TONOMETRY
OS_IOP_MMHG: 13
IOP_METHOD: TONOPEN
OD_IOP_MMHG: 14

## 2021-07-01 ASSESSMENT — REFRACTION_WEARINGRX
OS_CYLINDER: +1.75
OD_CYLINDER: +1.50
SPECS_TYPE: PAL
OD_ADD: +2.75
OD_SPHERE: -0.25
OS_ADD: +2.75
OS_AXIS: 180
OS_SPHERE: -0.50
OD_AXIS: 005

## 2021-07-01 ASSESSMENT — REFRACTION_MANIFEST
OS_ADD: +2.75
OD_ADD: +2.75
OD_AXIS: 005
OD_SPHERE: PLANO
OS_SPHERE: -0.75
OD_CYLINDER: +1.50
OS_CYLINDER: +1.75
OS_AXIS: 180

## 2021-07-01 ASSESSMENT — CUP TO DISC RATIO
OS_RATIO: 0.4
OD_RATIO: 0.3

## 2021-07-01 ASSESSMENT — VISUAL ACUITY
CORRECTION_TYPE: GLASSES
METHOD: SNELLEN - LINEAR
OD_CC: 20/20
OS_CC: 20/20

## 2021-07-01 ASSESSMENT — EXTERNAL EXAM - RIGHT EYE: OD_EXAM: NORMAL

## 2021-07-01 ASSESSMENT — CONF VISUAL FIELD
OS_NORMAL: 1
OD_NORMAL: 1
METHOD: COUNTING FINGERS

## 2021-07-01 ASSESSMENT — SLIT LAMP EXAM - LIDS
COMMENTS: MGD
COMMENTS: MGD

## 2021-07-01 ASSESSMENT — EXTERNAL EXAM - LEFT EYE: OS_EXAM: NORMAL

## 2021-07-01 NOTE — NURSING NOTE
Chief Complaints and History of Present Illnesses   Patient presents with     Macular Degeneration Follow Up     Chief Complaint(s) and History of Present Illness(es)     Macular Degeneration Follow Up     Laterality: both eyes    Onset: 6 months ago    Location: central vision    Course: stable    Associated symptoms: floaters (stable).  Negative for eye pain, tearing, dryness and trauma    Treatments tried: artificial tears    Response to treatment: mild improvement    Pain scale: 0/10              Comments     Pt here today for 6 mos follow up.  No eye health or vision concerns, would like new Rx to get new glasses.  Pt denies pain or any discomforts, states things are going well.    Gila Samaniego COA, DEVON 2:20 PM 07/01/2021

## 2021-07-01 NOTE — PROGRESS NOTES
CC: follow up glaucoma and  Age related macular degeneration     HPI:  Stable vision and metamorphopsia. No eye pain. No new flashes or floaters. History of Age related macular degeneration. s/p  CE/IOL right eye 9/11/18 and left eye 10/10/18  She can still enjoy reading and denies scotomas, metamorphopsias.   MS under control       OCT 7-1-21  Right eye: drusen Retinal pigment epithelium changes, no subretinal fluid; PHF attached, stable  Left eye: few drusen; stable. PHF attached stable    FAF 7-1-21  RE: mottled nasal macular hypo/hyperAF, stable  LE: minimal ST macular hypoAF, stable    ONFL 07/01/21   Right eye: within normal limits   Left eye: stable TS thinning    OVF 24-2 07/01/21   Right eye: scattered spots of decreased sensitivity (non glaucomatous appearance) FN 28%  Left eye: scattered spots of decreased sensitivity (non glaucomatous appearance) FP 14%    Assessment & Plan:  # History of Exudative Age related macular degeneration right eye   Possible pattern dystrophy   - no exudative changes    - stable    - s/p previous Avastin (x4 in 7/9/2013)   - No activity, Observe    # mild Age related macular degeneration left eye    - AREDS vitamins, Amsler grid monitoring weekly OCT stable, no injections needed    - (+) FH: both parents had Age related macular degeneration     # glaucoma suspect with mild cup:disc asymmetry   - IOP excellent   - ONFL normal right eye; stable TS thinning left eye    - OVF: scattered spots of decreased sensitivity (non glaucomatous appearance) both eyes    - observe without  Eyedrops   - follow up in one yr. Could alternate glaucoma testing given low susp    4. Hx of Optic neuritis left eye and history of Migraine aura without headache    - seen and evaluated by Dr. Hernandez in the past  Pupil dilated upon my examination  No intraocular inflammation  Color vision: 11/11 both eyes   No eye pain     6. History of multiple sclerosis diagnosed several ys ago   History of uveitis both  eyes - no activity today 07/01/21    History of taking IV IG  and steroids   currently without Treatment   Consider follow up with neurologist    7- MGD each eye   - viscous discharged expressed from Meiboman glands   - warm compression discussed    Plan:   Return to retina clinic in 9 months with Optical Coherence Tomography and ONFL   Will alternate glaucoma testing   Patient not interested in conjunctiva bx stem cell research in the future    Marissa Gonsalez MD  PGY-2 Resident Physician  Department of Ophthalmology        ~~~~~~~~~~~~~~~~~~~~~~~~~~~~~~~~~~   Complete documentation of historical and exam elements from today's encounter can be found in the full encounter summary report (not reduplicated in this progress note).  I personally obtained the chief complaint(s) and history of present illness.  I confirmed and edited as necessary the review of systems, past medical/surgical history, family history, social history, and examination findings as documented by others; and I examined the patient myself.  I personally reviewed the relevant tests, images, and reports as documented above.  I personally reviewed the ophthalmic test(s) associated with this encounter, agree with the interpretation(s) as documented by the resident/fellow, and have edited the corresponding report(s) as necessary.   I formulated and edited as necessary the assessment and plan and discussed the findings and management plan with the patient and family    Kesha Patel MD  .  Retina Service   Department of Ophthalmology and Visual Neurosciences   [facility]  Phone: [phone]   Fax: [phone]

## 2022-02-23 DIAGNOSIS — H53.40 VISUAL FIELD DEFECT: ICD-10-CM

## 2022-02-23 DIAGNOSIS — H35.3130 BILATERAL NONEXUDATIVE AGE-RELATED MACULAR DEGENERATION, UNSPECIFIED STAGE: Primary | ICD-10-CM

## 2022-06-16 ENCOUNTER — OFFICE VISIT (OUTPATIENT)
Dept: OPHTHALMOLOGY | Facility: CLINIC | Age: 72
End: 2022-06-16
Attending: OPHTHALMOLOGY
Payer: MEDICARE

## 2022-06-16 DIAGNOSIS — H53.40 VISUAL FIELD DEFECT: ICD-10-CM

## 2022-06-16 DIAGNOSIS — H35.3130 BILATERAL NONEXUDATIVE AGE-RELATED MACULAR DEGENERATION, UNSPECIFIED STAGE: ICD-10-CM

## 2022-06-16 PROCEDURE — 92014 COMPRE OPH EXAM EST PT 1/>: CPT | Performed by: OPHTHALMOLOGY

## 2022-06-16 PROCEDURE — 92133 CPTRZD OPH DX IMG PST SGM ON: CPT | Performed by: OPHTHALMOLOGY

## 2022-06-16 PROCEDURE — 92134 CPTRZ OPH DX IMG PST SGM RTA: CPT | Performed by: OPHTHALMOLOGY

## 2022-06-16 PROCEDURE — G0463 HOSPITAL OUTPT CLINIC VISIT: HCPCS | Mod: 25

## 2022-06-16 PROCEDURE — 99207 OCT OPTIC NERVE RNFL SPECTRALIS OU (BOTH EYES): CPT | Mod: 26 | Performed by: OPHTHALMOLOGY

## 2022-06-16 ASSESSMENT — EXTERNAL EXAM - LEFT EYE: OS_EXAM: NORMAL

## 2022-06-16 ASSESSMENT — VISUAL ACUITY
CORRECTION_TYPE: GLASSES
OS_PH_CC: 20/25
OS_PH_CC+: -1
METHOD: SNELLEN - LINEAR
OS_CC: 20/30
OD_CC: 20/25

## 2022-06-16 ASSESSMENT — TONOMETRY
OS_IOP_MMHG: 12
OD_IOP_MMHG: 13
IOP_METHOD: TONOPEN

## 2022-06-16 ASSESSMENT — SLIT LAMP EXAM - LIDS
COMMENTS: MGD
COMMENTS: MGD

## 2022-06-16 ASSESSMENT — CONF VISUAL FIELD
OD_NORMAL: 1
METHOD: COUNTING FINGERS
OS_NORMAL: 1

## 2022-06-16 ASSESSMENT — CUP TO DISC RATIO
OD_RATIO: 0.3
OS_RATIO: 0.4

## 2022-06-16 ASSESSMENT — EXTERNAL EXAM - RIGHT EYE: OD_EXAM: NORMAL

## 2022-06-16 NOTE — PROGRESS NOTES
CC: follow up glaucoma and  Age related macular degeneration     HPI:  History of Age related macular degeneration. s/p  CE/IOL right eye 9/11/18 and left eye 10/10/18  Interval 06/16/22 : Occasional sees a flash of light once a day; no eye pain; some metamorphopsia. She can still enjoy reading and denies scotomas  PMH: MS under control       imaging    OCT 06/16/22   Right eye: drusen Retinal pigment epithelium changes, no subretinal fluid; PHF attached, stable  Left eye: few drusen; stable. PHF attached stable    ONFL 06/16/22   Right eye: within normal limits   Left eye: stable TS thinning    FAF 7-1-21  RE: mottled nasal macular hypo/hyperAF, stable  LE: minimal ST macular hypoAF, stable    OVF 24-2 07/01/21   Right eye: scattered spots of decreased sensitivity (non glaucomatous appearance) FN 28%  Left eye: scattered spots of decreased sensitivity (non glaucomatous appearance) FP 14%    Assessment & Plan:  # History of Exudative Age related macular degeneration right eye   Possible pattern dystrophy   - no exudative changes    - stable    - s/p previous Avastin (x4 in 7/9/2013)   - No activity, Observe    # mild Age related macular degeneration left eye    - AREDS vitamins, Amsler grid monitoring weekly OCT stable, no injections needed    - (+) FH: both parents had Age related macular degeneration     # glaucoma suspect with mild cup:disc asymmetry   - IOP excellent   - ONFL normal right eye; stable TS thinning left eye    - OVF: scattered spots of decreased sensitivity (non glaucomatous appearance) both eyes    - observe without  Eyedrops   - follow up in one yr. Could alternate glaucoma testing given low susp.    # Hx of Optic neuritis left eye and history of Migraine aura without headache    - seen and evaluated by Dr. 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  I personally obtained the chief complaint(s) and history of present illness.  I confirmed and edited as necessary the review of systems, past medical/surgical history, family history, social history, and examination findings as documented by others; and I examined the patient myself.  I personally reviewed the relevant tests, images, and reports as documented above.  I formulated and edited as necessary the assessment and plan and discussed the findings and management plan with the patient and family    Kesha Patel MD  .  Retina Service   Department of Ophthalmology and Visual Neurosciences   [facility]  Phone: [phone]   Fax: [phone]

## 2022-06-16 NOTE — NURSING NOTE
"Chief Complaints and History of Present Illnesses   Patient presents with     Macular Degeneration Follow Up     9 month follow up for AMD each eye and Glaucoma suspect each eye.   Patient states vision is stable each eye in the last several months. Denies eye pain. Patient uses drops for dryness and gets relief from them.     JUAN RAMON Cifuentes 2:02 PM 06/16/2022       Chief Complaint(s) and History of Present Illness(es)     Macular Degeneration Follow Up     Laterality: both eyes    Onset: months ago    Course: stable    Associated symptoms: dryness and flashes (\"I don't know if it's the ocular migraines or not.\").  Negative for eye pain and floaters    Treatments tried: artificial tears and glasses    Pain scale: 0/10    Comments: 9 month follow up for AMD each eye and Glaucoma suspect each eye.   Patient states vision is stable each eye in the last several months. Denies eye pain. Patient uses drops for dryness and gets relief from them.     JUAN RAMON Cifuentes 2:02 PM 06/16/2022                  "

## 2023-02-28 DIAGNOSIS — H35.3130 BILATERAL NONEXUDATIVE AGE-RELATED MACULAR DEGENERATION, UNSPECIFIED STAGE: ICD-10-CM

## 2023-02-28 DIAGNOSIS — H53.40 VISUAL FIELD DEFECT: Primary | ICD-10-CM

## 2023-03-29 ENCOUNTER — TELEPHONE (OUTPATIENT)
Dept: OPHTHALMOLOGY | Facility: CLINIC | Age: 73
End: 2023-03-29
Payer: MEDICARE

## 2023-03-29 NOTE — TELEPHONE ENCOUNTER
Called and LVM     Made an appointment for 4/3 @ 1230 pm with Dr. ARMSTRONG - we have no other openings that day or week .     Adrienne Santizo Communication Facilitator on 3/29/2023 at 12:15 PM

## 2023-03-29 NOTE — TELEPHONE ENCOUNTER
[facility] Call Center    Phone Message    May a detailed message be left on voicemail: yes     Reason for Call: Other: pt would like to be seen sooner than her 5/25 appt. She said that she is having problems with her right eye. Vision changes and occular migraines in that eye. Writer offered a couple of appt times in April with Jorge and pt declined. She is wondering if there is another provider that she could see for this. She said that she would like a detailed message left if she is unable to get to the phone. Please review and contact pt for options.     Thank you     Action Taken: Message routed to:  [facility]    Travel Screening: Not Applicable

## 2023-04-03 ENCOUNTER — OFFICE VISIT (OUTPATIENT)
Dept: OPHTHALMOLOGY | Facility: CLINIC | Age: 73
End: 2023-04-03
Attending: OPHTHALMOLOGY
Payer: MEDICARE

## 2023-04-03 DIAGNOSIS — H35.3211 EXUDATIVE AGE-RELATED MACULAR DEGENERATION OF RIGHT EYE WITH ACTIVE CHOROIDAL NEOVASCULARIZATION (H): Primary | ICD-10-CM

## 2023-04-03 DIAGNOSIS — H35.3122 INTERMEDIATE STAGE NONEXUDATIVE AGE-RELATED MACULAR DEGENERATION OF LEFT EYE: ICD-10-CM

## 2023-04-03 PROCEDURE — 67028 INJECTION EYE DRUG: CPT | Mod: RT | Performed by: OPHTHALMOLOGY

## 2023-04-03 PROCEDURE — G0463 HOSPITAL OUTPT CLINIC VISIT: HCPCS | Mod: 25 | Performed by: OPHTHALMOLOGY

## 2023-04-03 PROCEDURE — 92250 FUNDUS PHOTOGRAPHY W/I&R: CPT | Performed by: OPHTHALMOLOGY

## 2023-04-03 PROCEDURE — 92134 CPTRZ OPH DX IMG PST SGM RTA: CPT | Performed by: OPHTHALMOLOGY

## 2023-04-03 PROCEDURE — 250N000011 HC RX IP 250 OP 636: Performed by: OPHTHALMOLOGY

## 2023-04-03 PROCEDURE — 99214 OFFICE O/P EST MOD 30 MIN: CPT | Mod: 25 | Performed by: OPHTHALMOLOGY

## 2023-04-03 PROCEDURE — 99207 FUNDUS AUTOFLUORESCENCE IMAGE (FAF) OU (BOTH EYES): CPT | Mod: 26 | Performed by: OPHTHALMOLOGY

## 2023-04-03 RX ADMIN — Medication 1.25 MG: at 15:00

## 2023-04-03 ASSESSMENT — REFRACTION_WEARINGRX
OS_ADD: +2.75
OD_AXIS: 005
OD_ADD: +2.75
OS_AXIS: 180
OD_SPHERE: -0.25
OS_SPHERE: -0.50
SPECS_TYPE: PAL
OS_CYLINDER: +1.75
OD_CYLINDER: +1.50

## 2023-04-03 ASSESSMENT — VISUAL ACUITY
METHOD: SNELLEN - LINEAR
OS_CC: 20/25
OD_CC: 20/60
CORRECTION_TYPE: GLASSES
OS_CC+: -3

## 2023-04-03 ASSESSMENT — CONF VISUAL FIELD
OS_INFERIOR_NASAL_RESTRICTION: 0
OS_NORMAL: 1
OS_SUPERIOR_NASAL_RESTRICTION: 0
OD_SUPERIOR_TEMPORAL_RESTRICTION: 0
OD_SUPERIOR_NASAL_RESTRICTION: 0
OD_NORMAL: 1
OS_INFERIOR_TEMPORAL_RESTRICTION: 0
OS_SUPERIOR_TEMPORAL_RESTRICTION: 0
METHOD: COUNTING FINGERS
OD_INFERIOR_NASAL_RESTRICTION: 0
OD_INFERIOR_TEMPORAL_RESTRICTION: 0

## 2023-04-03 ASSESSMENT — CUP TO DISC RATIO
OS_RATIO: 0.4
OD_RATIO: 0.3

## 2023-04-03 ASSESSMENT — EXTERNAL EXAM - RIGHT EYE: OD_EXAM: NORMAL

## 2023-04-03 ASSESSMENT — TONOMETRY
IOP_METHOD: ICARE
OS_IOP_MMHG: 19
OD_IOP_MMHG: 15

## 2023-04-03 ASSESSMENT — EXTERNAL EXAM - LEFT EYE: OS_EXAM: NORMAL

## 2023-04-03 ASSESSMENT — SLIT LAMP EXAM - LIDS
COMMENTS: MGD
COMMENTS: MGD

## 2023-04-03 NOTE — NURSING NOTE
Chief Complaints and History of Present Illnesses   Patient presents with     Follow Up     Chief Complaint(s) and History of Present Illness(es)     Follow Up            Laterality: both eyes    Associated symptoms: floaters.  Negative for eye pain and flashes    Treatments tried: artificial tears          Comments    Here for follow up. Vision is worsening. Stable floaters without flashes. No eye pain. Uses lubricating drops as needed.    Dimitri Richey COT 1:10 PM April 3, 2023

## 2023-04-03 NOTE — PROGRESS NOTES
I have confirmed the patient's and reviewed Past Medical History, Past Surgical History, Social History, Family History, Problem List, Medication List and agree with Tech note.        CC: follow up glaucoma and  Age related macular degeneration     Interval: Noticed decreased vision in the right eye a few weeks ago. She thought it was a migraine aura. But it didn't get better. She feels like half her vision is gone - she can see the center but above it everything is gone.     HPI: Sondra Castro is a 72 year old female with of Age related macular degeneration here for follow up    Ocular Surgery  2018.9.11 Right CE/IOL   2018.10.10 Left CEIOL     PMH: MS under control       Imaging    OCT 04/03/23  Right eye: VMA, foveal contour present but flattened by new SRHM, underlying irregular PED, few pockets of IRF, thin SRF inferiorly, choroid thick - worse  Left eye: foveal contour present with VMA, few drusen, some large, no fluid, choroid equal thickness to retina, stable    ONFL 06/16/22   Right eye: within normal limits   Left eye: stable TS thinning    FAF 04/03/23  RE: interval new areas of hypoAF, granular hypoAF nasally  LE: mild hyperAF in peripapillary area, otherwise normal AF pattern    OVF 24-2 07/01/21   Right eye: scattered spots of decreased sensitivity (non glaucomatous appearance) FN 28%  Left eye: scattered spots of decreased sensitivity (non glaucomatous appearance) FP 14%    Assessment & Plan:    1.  History of Exudative Age related macular degeneration right eye    - Possible pattern dystrophy   - s/p previous Avastin (x4 in 7/9/2013)   - new exudative changes noted 04/03/23 with VA decreased to 20/60   - recommend initiation of treatment with Avastin OD (will do 3 injections and re-eval in 2 mo.     2.  mild Age related macular degeneration left eye    - AREDS2 vitamins, Amsler grid monitoring weekly OCT stable, no injections needed    - (+) FH: both parents had Age related macular degeneration      #3. glaucoma suspect with mild cup:disc asymmetry   - IOP excellent   - ONFL normal right eye; stable TS thinning left eye    - OVF: scattered spots of decreased sensitivity, both eyes    - observe without drops   - follows with [place]    # Hx of MS c/b optic neuritis left eye    seen and evaluated by Dr. Hernandez in the past   History of uveitis both eyes - no activity today 07/01/21    History of taking IV IG  and steroids   currently without Treatment   Consider follow up with neurologist    4.  H/o Acephalgic migraine with aura      Plan:   Return to [place] clinic in 4 weeks for Avastin OD 2/2      Bradley Pagan MD  Vitreoretinal Surgical Fellow, PGY6  [facility]    ATTESTATION:  I have seen and examined the patient with Dr. Pagan and agree with the findings in this note, as well as the interpretations of the diagnostic tests.  I was present for procedure.     Layne Burrell MD PhD.  Professor & Chair

## 2023-05-08 ENCOUNTER — OFFICE VISIT (OUTPATIENT)
Dept: OPHTHALMOLOGY | Facility: CLINIC | Age: 73
End: 2023-05-08
Attending: OPHTHALMOLOGY
Payer: MEDICARE

## 2023-05-08 DIAGNOSIS — H35.3211 EXUDATIVE AGE-RELATED MACULAR DEGENERATION OF RIGHT EYE WITH ACTIVE CHOROIDAL NEOVASCULARIZATION (H): Primary | ICD-10-CM

## 2023-05-08 PROCEDURE — 250N000011 HC RX IP 250 OP 636: Performed by: OPHTHALMOLOGY

## 2023-05-08 PROCEDURE — 99207 PR DROP WITH A PROCEDURE: CPT | Performed by: OPHTHALMOLOGY

## 2023-05-08 PROCEDURE — 67028 INJECTION EYE DRUG: CPT | Mod: RT | Performed by: OPHTHALMOLOGY

## 2023-05-08 RX ORDER — METHYLDOPA/HYDROCHLOROTHIAZIDE 250MG-15MG
TABLET ORAL
COMMUNITY

## 2023-05-08 RX ADMIN — Medication 1.25 MG: at 16:24

## 2023-05-08 ASSESSMENT — CONF VISUAL FIELD
OS_INFERIOR_NASAL_RESTRICTION: 0
OD_INFERIOR_TEMPORAL_RESTRICTION: 0
METHOD: COUNTING FINGERS
OD_SUPERIOR_NASAL_RESTRICTION: 0
OS_NORMAL: 1
OS_SUPERIOR_TEMPORAL_RESTRICTION: 0
OD_NORMAL: 1
OD_SUPERIOR_TEMPORAL_RESTRICTION: 0
OD_INFERIOR_NASAL_RESTRICTION: 0
OS_SUPERIOR_NASAL_RESTRICTION: 0
OS_INFERIOR_TEMPORAL_RESTRICTION: 0

## 2023-05-08 ASSESSMENT — VISUAL ACUITY
OS_CC+: -2
CORRECTION_TYPE: GLASSES
OD_CC: 20/40
OD_CC+: -1
METHOD: SNELLEN - LINEAR
OS_CC: 20/20

## 2023-05-08 ASSESSMENT — TONOMETRY
OD_IOP_MMHG: 11
IOP_METHOD: ICARE
OS_IOP_MMHG: 16

## 2023-05-08 ASSESSMENT — REFRACTION_WEARINGRX
OS_SPHERE: -0.75
SPECS_TYPE: PAL
OS_ADD: +2.75
OD_SPHERE: +0.25
OS_AXIS: 002
OS_CYLINDER: +1.75
OD_CYLINDER: +1.50
OD_ADD: +2.75
OD_AXIS: 005

## 2023-05-08 NOTE — NURSING NOTE
"Chief Complaints and History of Present Illnesses   Patient presents with     Follow Up     4 week follow up h/o Exudative Age related macular degeneration right eye      Chief Complaint(s) and History of Present Illness(es)     Follow Up            Laterality: right eye    Associated symptoms: Negative for eye pain    Treatments tried: artificial tears    Pain scale: 0/10    Comments: 4 week follow up h/o Exudative Age related macular degeneration right eye           Comments    Pt state vision in right eye has improved since last visit.   Had an ocular migraine right eye 4 days ago with \"kaleidoscope of colors\".  No flashes or floaters. No eye pain today  Uses AT PRN for dryness.     Robi Gold 3:33 PM May 8, 2023                    "

## 2023-05-08 NOTE — PROGRESS NOTES
Avastin right eye today 2/2    Return to clinic 5 weeks for Exam/OCT and possible Avastin right eye     Layne Burrell MD PhD.  Professor & Chair

## 2023-05-26 DIAGNOSIS — H53.40 VISUAL FIELD DEFECT: ICD-10-CM

## 2023-05-26 DIAGNOSIS — H35.3211 EXUDATIVE AGE-RELATED MACULAR DEGENERATION OF RIGHT EYE WITH ACTIVE CHOROIDAL NEOVASCULARIZATION (H): Primary | ICD-10-CM

## 2023-06-12 ENCOUNTER — OFFICE VISIT (OUTPATIENT)
Dept: OPHTHALMOLOGY | Facility: CLINIC | Age: 73
End: 2023-06-12
Attending: OPHTHALMOLOGY
Payer: MEDICARE

## 2023-06-12 DIAGNOSIS — H53.40 VISUAL FIELD DEFECT: ICD-10-CM

## 2023-06-12 DIAGNOSIS — H35.3211 EXUDATIVE AGE-RELATED MACULAR DEGENERATION OF RIGHT EYE WITH ACTIVE CHOROIDAL NEOVASCULARIZATION (H): ICD-10-CM

## 2023-06-12 PROCEDURE — G0463 HOSPITAL OUTPT CLINIC VISIT: HCPCS | Mod: 25 | Performed by: OPHTHALMOLOGY

## 2023-06-12 PROCEDURE — 92134 CPTRZ OPH DX IMG PST SGM RTA: CPT | Performed by: OPHTHALMOLOGY

## 2023-06-12 PROCEDURE — 67028 INJECTION EYE DRUG: CPT | Mod: RT | Performed by: OPHTHALMOLOGY

## 2023-06-12 PROCEDURE — 99214 OFFICE O/P EST MOD 30 MIN: CPT | Mod: 25 | Performed by: OPHTHALMOLOGY

## 2023-06-12 PROCEDURE — 92083 EXTENDED VISUAL FIELD XM: CPT | Performed by: OPHTHALMOLOGY

## 2023-06-12 PROCEDURE — 250N000011 HC RX IP 250 OP 636: Performed by: OPHTHALMOLOGY

## 2023-06-12 RX ADMIN — Medication 1.25 MG: at 16:55

## 2023-06-12 ASSESSMENT — REFRACTION_WEARINGRX
OS_SPHERE: -0.75
OD_CYLINDER: +1.50
OS_ADD: +2.75
OD_ADD: +2.75
SPECS_TYPE: PAL
OS_AXIS: 002
OD_SPHERE: +0.25
OS_CYLINDER: +1.75
OD_AXIS: 005

## 2023-06-12 ASSESSMENT — VISUAL ACUITY
OD_CC+: -2
OD_CC: 20/40
CORRECTION_TYPE: GLASSES
METHOD: SNELLEN - LINEAR
OS_CC+: -3
OS_CC: 20/20

## 2023-06-12 ASSESSMENT — CONF VISUAL FIELD
OD_SUPERIOR_TEMPORAL_RESTRICTION: 0
OD_INFERIOR_NASAL_RESTRICTION: 0
OS_INFERIOR_NASAL_RESTRICTION: 0
METHOD: COUNTING FINGERS
OD_INFERIOR_TEMPORAL_RESTRICTION: 0
OS_SUPERIOR_TEMPORAL_RESTRICTION: 0
OS_SUPERIOR_NASAL_RESTRICTION: 0
OS_NORMAL: 1
OS_INFERIOR_TEMPORAL_RESTRICTION: 0
OD_SUPERIOR_NASAL_RESTRICTION: 0
OD_NORMAL: 1

## 2023-06-12 ASSESSMENT — EXTERNAL EXAM - RIGHT EYE: OD_EXAM: NORMAL

## 2023-06-12 ASSESSMENT — TONOMETRY
IOP_METHOD: TONOPEN
OD_IOP_MMHG: 17
OS_IOP_MMHG: 20

## 2023-06-12 ASSESSMENT — EXTERNAL EXAM - LEFT EYE: OS_EXAM: NORMAL

## 2023-06-12 ASSESSMENT — SLIT LAMP EXAM - LIDS
COMMENTS: MGD
COMMENTS: MGD

## 2023-06-12 ASSESSMENT — CUP TO DISC RATIO
OS_RATIO: 0.4
OD_RATIO: 0.3

## 2023-06-12 NOTE — PROGRESS NOTES
CC: follow up glaucoma and  Age related macular degeneration     HPI:  History of Age related macular degeneration. s/p  CE/IOL right eye 9/11/18 and left eye 10/10/18  Interval 06/16/22 : Occasional sees a flash of light once a day; no eye pain; some metamorphopsia. She can still enjoy reading and denies scotomas.  PMH: MS under control       imaging    OCT 06/12/23   Right eye: drusen Retinal pigment epithelium changes, (+) NEW subretinal fluid; PH attached, stable  Left eye: few drusen; stable. PHF attached stable    ONFL 06/16/22   Right eye: within normal limits   Left eye: stable TS thinning    FAF 7-1-21  RE: mottled nasal macular hypo/hyperAF, stable  LE: minimal ST macular hypoAF, stable    OVF 24-2 06/12/23     Right eye: scattered spots of decreased sensitivity (non glaucomatous appearance) MD -5.2 (prior -3.5)  Left eye: scattered spots of decreased sensitivity; possible sup arcuate (non glaucomatous appearance) MD -11.3 (worse compared to prior -5.9)    Assessment & Plan:    # Recurrent Exudative Age related macular degeneration right eye    - now with persistent exudative changes    - stable    - s/p previous Avastin (x4 in 7/9/2013)   - New exudative changes noted 04/03/23 with VA decreased to 20/60   - status post avastin x2   - 06/12/23 Optical Coherence Tomography with subretinal fluid   PLAN: see below    # dry Age related macular degeneration left eye    - AREDS vitamins, Amsler grid monitoring weekly OCT stable, no injections needed    - (+) FH: both parents had Age related macular degeneration     # glaucoma suspect with mild cup:disc asymmetry   - IOP excellent   - ONFL normal right eye; stable TS thinning left eye    - OVF: scattered spots of decreased sensitivity (non glaucomatous appearance) both eyes    - observe without  Eyedrops   - follow up in one yr. Could alternate glaucoma testing given low susp.    # Hx of Optic neuritis left eye and history of Migraine aura without headache    -  seen and evaluated by Dr. Hernandez in the past  Pupil dilated upon my examination  No intraocular inflammation  Color vision: 11/11 both eyes   No eye pain     # History of multiple sclerosis diagnosed several ys ago   History of uveitis both eyes - no activity today 07/01/21    History of taking IV IG  and steroids   currently without Treatment   Consider follow up with neurologist    #- MGD each eye   - viscous discharged expressed from Meiboman glands   - warm compression discussed    PLAN: .  Plan for avastin inj 06/12/23 # 3  Follow up in 4 weeks with Optical Coherence Tomography possible avastin vs Vabysmo  Patient likes 3 rounds of lido     ~~~~~~~~~~~~~~~~~~~~~~~~~~~~~~~~~~   Complete documentation of historical and exam elements from today's encounter can be found in the full encounter summary report (not reduplicated in this progress note).  I personally obtained the chief complaint(s) and history of present illness.  I confirmed and edited as necessary the review of systems, past medical/surgical history, family history, social history, and examination findings as documented by others; and I examined the patient myself.  I personally reviewed the relevant tests, images, and reports as documented above.  I formulated and edited as necessary the assessment and plan and discussed the findings and management plan with the patient and family and No resident or fellow assisted with the procedures performed.  I performed the procedures myself.    Kesha Patel MD  .  Retina Service   Department of Ophthalmology and Visual Neurosciences   [facility]  Phone: [phone]   Fax: [phone]

## 2023-06-12 NOTE — NURSING NOTE
"Chief Complaints and History of Present Illnesses   Patient presents with     Macular Degeneration Follow Up     5 week follow up Exudative age-related macular degeneration of right eye      Chief Complaint(s) and History of Present Illness(es)     Macular Degeneration Follow Up            Associated symptoms: Negative for eye pain, flashes and floaters    Treatments tried: artificial tears    Pain scale: 0/10    Comments: 5 week follow up Exudative age-related macular degeneration of right eye           Comments    Vision has improved right eye, \"I'm seeing more clear\"  Pt is having more ocular migraines, every other day.   Pt says her ocular migraines can last from 20min to 5 hours.   Uses AT PRN for dryness.  No new flashes or floaters.     Robi Gold 2:57 PM June 12, 2023                    "

## 2023-07-05 DIAGNOSIS — H35.3211 EXUDATIVE AGE-RELATED MACULAR DEGENERATION OF RIGHT EYE WITH ACTIVE CHOROIDAL NEOVASCULARIZATION (H): Primary | ICD-10-CM

## 2023-07-12 ENCOUNTER — OFFICE VISIT (OUTPATIENT)
Dept: OPHTHALMOLOGY | Facility: CLINIC | Age: 73
End: 2023-07-12
Attending: OPHTHALMOLOGY
Payer: MEDICARE

## 2023-07-12 DIAGNOSIS — H35.3211 EXUDATIVE AGE-RELATED MACULAR DEGENERATION OF RIGHT EYE WITH ACTIVE CHOROIDAL NEOVASCULARIZATION (H): ICD-10-CM

## 2023-07-12 PROCEDURE — 67028 INJECTION EYE DRUG: CPT | Mod: RT | Performed by: OPHTHALMOLOGY

## 2023-07-12 PROCEDURE — 92134 CPTRZ OPH DX IMG PST SGM RTA: CPT | Performed by: OPHTHALMOLOGY

## 2023-07-12 PROCEDURE — 250N000011 HC RX IP 250 OP 636: Mod: JZ | Performed by: STUDENT IN AN ORGANIZED HEALTH CARE EDUCATION/TRAINING PROGRAM

## 2023-07-12 PROCEDURE — G0463 HOSPITAL OUTPT CLINIC VISIT: HCPCS | Mod: 25 | Performed by: OPHTHALMOLOGY

## 2023-07-12 RX ADMIN — FARICIMAB 6 MG: 6 INJECTION, SOLUTION INTRAVITREAL at 15:37

## 2023-07-12 ASSESSMENT — TONOMETRY
IOP_METHOD: ICARE
OD_IOP_MMHG: 14
OS_IOP_MMHG: 16

## 2023-07-12 ASSESSMENT — CONF VISUAL FIELD
OD_SUPERIOR_NASAL_RESTRICTION: 0
OS_SUPERIOR_TEMPORAL_RESTRICTION: 0
OD_SUPERIOR_TEMPORAL_RESTRICTION: 0
METHOD: COUNTING FINGERS
OD_INFERIOR_NASAL_RESTRICTION: 0
OD_INFERIOR_TEMPORAL_RESTRICTION: 0
OS_INFERIOR_NASAL_RESTRICTION: 0
OS_INFERIOR_TEMPORAL_RESTRICTION: 0
OS_SUPERIOR_NASAL_RESTRICTION: 0
OD_NORMAL: 1

## 2023-07-12 ASSESSMENT — CUP TO DISC RATIO
OD_RATIO: 0.3
OS_RATIO: 0.4

## 2023-07-12 ASSESSMENT — REFRACTION_WEARINGRX
OD_ADD: +2.75
OS_CYLINDER: +1.75
OS_ADD: +2.75
OS_AXIS: 002
OD_AXIS: 005
SPECS_TYPE: PAL
OD_SPHERE: +0.25
OD_CYLINDER: +1.50
OS_SPHERE: -0.75

## 2023-07-12 ASSESSMENT — VISUAL ACUITY
CORRECTION_TYPE: GLASSES
OS_CC: 20/30
METHOD: SNELLEN - LINEAR
OD_CC+: -3
OD_CC: 20/50

## 2023-07-12 ASSESSMENT — SLIT LAMP EXAM - LIDS
COMMENTS: MGD
COMMENTS: MGD

## 2023-07-12 ASSESSMENT — EXTERNAL EXAM - RIGHT EYE: OD_EXAM: NORMAL

## 2023-07-12 ASSESSMENT — EXTERNAL EXAM - LEFT EYE: OS_EXAM: NORMAL

## 2023-07-12 NOTE — NURSING NOTE
Chief Complaints and History of Present Illnesses   Patient presents with     Follow Up     4 week follow up     Chief Complaint(s) and History of Present Illness(es)     Follow Up            Associated symptoms: Negative for eye pain, flashes and floaters    Pain scale: 0/10    Comments: 4 week follow up          Comments    Pt states eyes feel ok. Pt states RE doesn't seem to have much improvement. Pt states no eye pain, flashes or floaters. Pt states she is having ocular migraines every day.     Arleen Lara OA 2:22 PM July 12, 2023

## 2023-07-12 NOTE — PROGRESS NOTES
CC: follow up glaucoma and  Age related macular degeneration    HPI:  History of Age related macular degeneration. s/p  CE/IOL right eye 9/11/18 and left eye 10/10/18    Interval 07/12/23  : No changes to vision. Flashing lights have stopped. Metamorphopsia stable.   Frequent ocular migraines right eye; she experienced positive visual phenomena almost everyday    PMH: MS under control       Imaging    OCT 07/12/23    Right eye: drusen Retinal pigment epithelium changes, mildly increased SRF since last visit,  -> 413  Left eye: few drusen; stable. VMA near fovea    ONFL 06/16/22   Right eye: within normal limits   Left eye: stable TS thinning    FAF 7-1-21  RE: mottled nasal macular hypo/hyperAF, stable  LE: minimal ST macular hypoAF, stable    OVF 24-2 06/12/23     Right eye: scattered spots of decreased sensitivity (non glaucomatous appearance) MD -5.2 (prior -3.5)  Left eye: scattered spots of decreased sensitivity; possible sup arcuate (non glaucomatous appearance) MD -11.3 (worse compared to prior -5.9)    Assessment & Plan:    # Recurrent Exudative Age related macular degeneration right eye    - With persistent exudative changes     - s/p previous Avastin (x4 in 7/9/2013)   - New exudative changes noted 04/03/23 with VA decreased to 20/60   - status post avastin x3 last 06/12/23   Optical Coherence Tomography with subretinal fluid, mildly increased SRF today 7/12/23  PLAN: see below    # dry Age related macular degeneration left eye    - AREDS vitamins, Amsler grid monitoring weekly OCT stable, no injections needed    - (+) FH: both parents had Age related macular degeneration     # glaucoma suspect with mild cup:disc asymmetry   - IOP excellent   - ONFL normal right eye; stable TS thinning left eye    - OVF: scattered spots of decreased sensitivity (non glaucomatous appearance) both eyes    - observe without  Eyedrops   - follow up in one yr. Could alternate glaucoma testing given low susp.    # Hx of  Optic neuritis left eye   Previously evaluated with neuro-ophthalmology     # History of multiple sclerosis diagnosed several ys ago   History of uveitis both eyes - no activity today 07/01/21    History of taking IV IG  and steroids   currently without Treatment   Consider follow up with neurologist    #- MGD each eye   - viscous discharged expressed from Meiboman glands   - warm compression discussed    #Ocular migraines   Remote history of migraines, restarted 4/2023 with primarily visual symptoms. Occur almost daily. Sees a kaleidoscope of colors that lasts 3 minutes.    -Seen with Dr. Hernandez in the past   -Follow up with neuro-ophthalmology    PLAN: .  Follow up in 4 weeks with Optical Coherence Tomography possible Vabysmo  Patient likes 3 rounds of lido   Follow up with neuro-ophthalmology for worsening of ocular migraines    Thank you for entrusting us with your care    Yohana Huber MD, PGY3  Ophthalmology Resident  [facility]      ~~~~~~~~~~~~~~~~~~~~~~~~~~~~~~~~~~   Complete documentation of historical and exam elements from today's encounter can be found in the full encounter summary report (not reduplicated in this progress note).  I personally obtained the chief complaint(s) and history of present illness.  I confirmed and edited as necessary the review of systems, past medical/surgical history, family history, social history, and examination findings as documented by others; and I examined the patient myself.  I personally reviewed the relevant tests, images, and reports as documented above.  I personally reviewed the ophthalmic test(s) associated with this encounter, agree with the interpretation(s) as documented by the resident/fellow, and have edited the corresponding report(s) as necessary.   I formulated and edited as necessary the assessment and plan and discussed the findings and management plan with the patient and family and No resident or fellow assisted with the procedures  performed.  I performed the procedures myself.    Kseha Patel MD  .  Retina Service   Department of Ophthalmology and Visual Neurosciences   [facility]  Phone: [phone]   Fax: [phone]

## 2023-07-26 DIAGNOSIS — H35.3211 EXUDATIVE AGE-RELATED MACULAR DEGENERATION OF RIGHT EYE WITH ACTIVE CHOROIDAL NEOVASCULARIZATION (H): Primary | ICD-10-CM

## 2023-08-09 ENCOUNTER — OFFICE VISIT (OUTPATIENT)
Dept: OPHTHALMOLOGY | Facility: CLINIC | Age: 73
End: 2023-08-09
Attending: OPHTHALMOLOGY
Payer: MEDICARE

## 2023-08-09 DIAGNOSIS — H35.3211 EXUDATIVE AGE-RELATED MACULAR DEGENERATION OF RIGHT EYE WITH ACTIVE CHOROIDAL NEOVASCULARIZATION (H): ICD-10-CM

## 2023-08-09 PROCEDURE — G0463 HOSPITAL OUTPT CLINIC VISIT: HCPCS | Mod: 25 | Performed by: OPHTHALMOLOGY

## 2023-08-09 PROCEDURE — 250N000011 HC RX IP 250 OP 636: Mod: JZ | Performed by: STUDENT IN AN ORGANIZED HEALTH CARE EDUCATION/TRAINING PROGRAM

## 2023-08-09 PROCEDURE — 67028 INJECTION EYE DRUG: CPT | Mod: RT | Performed by: OPHTHALMOLOGY

## 2023-08-09 PROCEDURE — 92134 CPTRZ OPH DX IMG PST SGM RTA: CPT | Performed by: OPHTHALMOLOGY

## 2023-08-09 RX ADMIN — FARICIMAB 6 MG: 6 INJECTION, SOLUTION INTRAVITREAL at 15:39

## 2023-08-09 ASSESSMENT — EXTERNAL EXAM - LEFT EYE: OS_EXAM: NORMAL

## 2023-08-09 ASSESSMENT — CONF VISUAL FIELD
OD_INFERIOR_TEMPORAL_RESTRICTION: 0
OS_SUPERIOR_NASAL_RESTRICTION: 0
OS_NORMAL: 1
OD_SUPERIOR_NASAL_RESTRICTION: 0
OD_INFERIOR_NASAL_RESTRICTION: 0
OD_NORMAL: 1
OS_INFERIOR_TEMPORAL_RESTRICTION: 0
OS_INFERIOR_NASAL_RESTRICTION: 0
METHOD: COUNTING FINGERS
OD_SUPERIOR_TEMPORAL_RESTRICTION: 0
OS_SUPERIOR_TEMPORAL_RESTRICTION: 0

## 2023-08-09 ASSESSMENT — TONOMETRY
IOP_METHOD: TONOPEN
OD_IOP_MMHG: 13
OS_IOP_MMHG: 13

## 2023-08-09 ASSESSMENT — VISUAL ACUITY
OD_CC+: -2
OS_CC: 20/30
CORRECTION_TYPE: GLASSES
METHOD: SNELLEN - LINEAR
OD_CC: 20/40
OS_CC+: +2

## 2023-08-09 ASSESSMENT — REFRACTION_WEARINGRX
OS_ADD: +2.75
OS_SPHERE: -0.75
OS_CYLINDER: +1.75
OD_SPHERE: +0.25
SPECS_TYPE: PAL
OD_ADD: +2.75
OD_CYLINDER: +1.50
OD_AXIS: 005
OS_AXIS: 002

## 2023-08-09 ASSESSMENT — CUP TO DISC RATIO
OD_RATIO: 0.3
OS_RATIO: 0.4

## 2023-08-09 ASSESSMENT — SLIT LAMP EXAM - LIDS
COMMENTS: MGD
COMMENTS: MGD

## 2023-08-09 ASSESSMENT — EXTERNAL EXAM - RIGHT EYE: OD_EXAM: NORMAL

## 2023-08-09 NOTE — NURSING NOTE
Chief Complaints and History of Present Illnesses   Patient presents with    Follow Up     Exudative age-related macular degeneration of right eye with active choroidal neovascularization     Chief Complaint(s) and History of Present Illness(es)       Follow Up              Comments: Exudative age-related macular degeneration of right eye with active choroidal neovascularization              Comments    Pt states the right eye vision seems some better  Pt C/o some glare left eye   States no flashes, floaters or eye pain    Cindy Palmer COT 2:02 PM August 9, 2023

## 2023-08-09 NOTE — PROGRESS NOTES
CC: follow up  Age related macular degeneration    HPI:  History of Age related macular degeneration. s/p  CE/IOL right eye 9/11/18 and left eye 10/10/18    Interval 08/09/23 reports improvement of the vision right eye.  Metamorphopsia improved.   History of Frequent ocular migraines right eye; she experienced positive visual phenomena almost everyday and flashes of l ight     PMH: MS under control       Imaging    OCT 08/09/23   Right eye: drusen Retinal pigment epithelium changes, resolved subretinal fluid; PH attached  Left eye: few drusen; stable. VMA near fovea    ONFL 06/16/22   Right eye: within normal limits   Left eye: stable TS thinning    FAF 7-1-21  RE: mottled nasal macular hypo/hyperAF, stable  LE: minimal ST macular hypoAF, stable    OVF 24-2 06/12/23     Right eye: scattered spots of decreased sensitivity (non glaucomatous appearance) MD -5.2 (prior -3.5)  Left eye: scattered spots of decreased sensitivity; possible sup arcuate (non glaucomatous appearance) MD -11.3 (worse compared to prior -5.9)    Assessment & Plan:    # Recurrent Exudative Age related macular degeneration right eye    - With persistent exudative changes     - s/p previous Avastin (x4 in 7/9/2013)   - New exudative changes noted 04/03/23 with VA decreased to 20/60   - status post avastin x3 last 06/12/23    - started Vabysmo 7.12.23- responding well   - recommend to continue Vabysmo inj x4 loading dose     # dry Age related macular degeneration left eye    - AREDS vitamins, Amsler grid monitoring weekly OCT stable, no injections needed    - (+) FH: both parents had Age related macular degeneration     # glaucoma suspect with mild cup:disc asymmetry   - IOP excellent   - ONFL normal right eye; stable TS thinning left eye    - OVF: scattered spots of decreased sensitivity (non glaucomatous appearance) both eyes    - observe without  Eyedrops   - follow up in one yr. Could alternate glaucoma testing given low susp.    # Hx of Optic  neuritis left eye   Previously evaluated with neuro-ophthalmology     # History of multiple sclerosis diagnosed several ys ago   History of uveitis both eyes - no activity today 07/01/21    History of taking IV IG  and steroids   currently without Treatment   Consider follow up with neurologist    #- MGD each eye   - viscous discharged expressed from Meiboman glands   - warm compression discussed    #Ocular migraines   Remote history of migraines, restarted 4/2023 with primarily visual symptoms. Occur almost daily. Sees a kaleidoscope of colors that lasts 3 minutes.    -Seen with Dr. Hernandez in the past   -Follows up with neurologist    # posterior chamber intraocular lens (PCIOL) both eyes with posterior capsular opacity (PCO) left eye   Patient complaining of glare  Recommend yag laser  Patient will return in 3 weeks for laser     PLAN: .  3 weeks for yag left eye dilation; then   Follow up in 4 weeks inj Vabysmo only ; no dilation  Patient likes 3 rounds of lido     Thank you for entrusting us with your care      ~~~~~~~~~~~~~~~~~~~~~~~~~~~~~~~~~~   Complete documentation of historical and exam elements from today's encounter can be found in the full encounter summary report (not reduplicated in this progress note).  I personally obtained the chief complaint(s) and history of present illness.  I confirmed and edited as necessary the review of systems, past medical/surgical history, family history, social history, and examination findings as documented by others; and I examined the patient myself.  I personally reviewed the relevant tests, images, and reports as documented above.  I formulated and edited as necessary the assessment and plan and discussed the findings and management plan with the patient and family and No resident or fellow assisted with the procedures performed.  I performed the procedures myself.    Kesha Patel MD  .  Retina Service   Department of Ophthalmology and Visual  Neurosciences   [facility]  Phone: [phone]   Fax: [phone]

## 2023-08-30 ENCOUNTER — OFFICE VISIT (OUTPATIENT)
Dept: OPHTHALMOLOGY | Facility: CLINIC | Age: 73
End: 2023-08-30
Attending: OPHTHALMOLOGY
Payer: MEDICARE

## 2023-08-30 DIAGNOSIS — H26.492 PCO (POSTERIOR CAPSULAR OPACIFICATION), LEFT: ICD-10-CM

## 2023-08-30 DIAGNOSIS — H35.3211 EXUDATIVE AGE-RELATED MACULAR DEGENERATION OF RIGHT EYE WITH ACTIVE CHOROIDAL NEOVASCULARIZATION (H): Primary | ICD-10-CM

## 2023-08-30 PROCEDURE — 66821 AFTER CATARACT LASER SURGERY: CPT | Mod: LT | Performed by: OPHTHALMOLOGY

## 2023-08-30 PROCEDURE — 99207 PR DROP WITH A PROCEDURE: CPT | Performed by: OPHTHALMOLOGY

## 2023-08-30 ASSESSMENT — VISUAL ACUITY
OD_CC+: -2
OD_CC: 20/40
METHOD: SNELLEN - LINEAR
OS_CC: 20/30
OS_CC+: -1

## 2023-08-30 ASSESSMENT — CONF VISUAL FIELD
OD_NORMAL: 1
OS_INFERIOR_NASAL_RESTRICTION: 0
OS_SUPERIOR_NASAL_RESTRICTION: 0
OS_NORMAL: 1
OD_SUPERIOR_TEMPORAL_RESTRICTION: 0
METHOD: COUNTING FINGERS
OD_SUPERIOR_NASAL_RESTRICTION: 0
OD_INFERIOR_TEMPORAL_RESTRICTION: 0
OS_SUPERIOR_TEMPORAL_RESTRICTION: 0
OS_INFERIOR_TEMPORAL_RESTRICTION: 0
OD_INFERIOR_NASAL_RESTRICTION: 0

## 2023-08-30 ASSESSMENT — CUP TO DISC RATIO
OD_RATIO: 0.3
OS_RATIO: 0.4

## 2023-08-30 ASSESSMENT — TONOMETRY
OS_IOP_MMHG: 16
OD_IOP_MMHG: 15
IOP_METHOD: TONOPEN

## 2023-08-30 ASSESSMENT — SLIT LAMP EXAM - LIDS
COMMENTS: MGD
COMMENTS: MGD

## 2023-08-30 ASSESSMENT — EXTERNAL EXAM - LEFT EYE: OS_EXAM: NORMAL

## 2023-08-30 ASSESSMENT — EXTERNAL EXAM - RIGHT EYE: OD_EXAM: NORMAL

## 2023-08-30 NOTE — NURSING NOTE
Chief Complaints and History of Present Illnesses   Patient presents with    Laser Eye Surgery Left Eye     Chief Complaint(s) and History of Present Illness(es)       Laser Eye Surgery Left Eye              Laterality: left eye    Associated symptoms: dryness and floaters.  Negative for eye pain, tearing, flashes, itching and burning    Pain scale: 0/10              Comments    Sondra is here today for a YAG procedure LE. She states she LE sees glare around lights. No change in the three weeks since she was last here.     [place] COT 1:05 PM August 30, 2023

## 2023-08-30 NOTE — PROGRESS NOTES
CC: follow up  posterior capsular opacity (PCO) left eye     HPI:  History of Age related macular degeneration. s/p  CE/IOL right eye 9/11/18 and left eye 10/10/18  Metamorphopsia improved.     History of Frequent ocular migraines right eye; she experienced positive visual phenomena almost everyday and flashes of l ight     PMH: MS under control       Imaging    OCT 08/09/23   Right eye: drusen Retinal pigment epithelium changes, resolved subretinal fluid; PH attached  Left eye: few drusen; stable. VMA near fovea    ONFL 06/16/22   Right eye: within normal limits   Left eye: stable TS thinning    FAF 7-1-21  RE: mottled nasal macular hypo/hyperAF, stable  LE: minimal ST macular hypoAF, stable    OVF 24-2 06/12/23     Right eye: scattered spots of decreased sensitivity (non glaucomatous appearance) MD -5.2 (prior -3.5)  Left eye: scattered spots of decreased sensitivity; possible sup arcuate (non glaucomatous appearance) MD -11.3 (worse compared to prior -5.9)    Assessment & Plan:    # posterior chamber intraocular lens (PCIOL) both eyes   with posterior capsular opacity (PCO) left eye   Patient complaining of glare  Recommend yag laser left eye 08/30/23   Risks, benefits and alternatives discussed with patient and agreed to proceed  No complications   Follow up in one week with prescription     # Recurrent Exudative Age related macular degeneration right eye    - With persistent exudative changes     - s/p previous Avastin (x4 in 7/9/2013)   - New exudative changes noted 04/03/23 with VA decreased to 20/60   - status post avastin x3 last 06/12/23    - started Vabysmo 7.12.23- 8.9.23 responding well   - recommend to continue Vabysmo inj x4 loading dose     # dry Age related macular degeneration left eye    - AREDS vitamins, Amsler grid monitoring weekly OCT stable, no injections needed    - (+) FH: both parents had Age related macular degeneration     # glaucoma suspect with mild cup:disc asymmetry   - IOP  excellent   - ONFL normal right eye; stable TS thinning left eye    - OVF: scattered spots of decreased sensitivity (non glaucomatous appearance) both eyes    - observe without  Eyedrops   - follow up in one yr. Could alternate glaucoma testing given low susp.    # Hx of Optic neuritis left eye   Previously evaluated with neuro-ophthalmology     # History of multiple sclerosis diagnosed several ys ago   History of uveitis both eyes - no activity today 07/01/21    History of taking IV IG  and steroids   currently without Treatment   Consider follow up with neurologist    #- MGD each eye   - viscous discharged expressed from Meiboman glands   - warm compression discussed    #Ocular migraines   Remote history of migraines, restarted 4/2023 with primarily visual symptoms. Occur almost daily. Sees a kaleidoscope of colors that lasts 3 minutes.    -Seen with Dr. Hernandez in the past   -Follows up with neurologist      PLAN: .  Follow up in one week post-yag left eye; dilation both eyes, Optical Coherence Tomography and Vabysmo right eye   Patient likes 3 rounds of lido       ~~~~~~~~~~~~~~~~~~~~~~~~~~~~~~~~~~   Complete documentation of historical and exam elements from today's encounter can be found in the full encounter summary report (not reduplicated in this progress note).  I personally obtained the chief complaint(s) and history of present illness.  I confirmed and edited as necessary the review of systems, past medical/surgical history, family history, social history, and examination findings as documented by others; and I examined the patient myself.  I personally reviewed the relevant tests, images, and reports as documented above.  I formulated and edited as necessary the assessment and plan and discussed the findings and management plan with the patient and family and No resident or fellow assisted with the procedures performed.  I performed the procedures myself.    Kesha Patel MD  .   Retina Service   Department of Ophthalmology and Visual Neurosciences   [facility]  Phone: [phone]   Fax: [phone]

## 2023-09-07 ENCOUNTER — OFFICE VISIT (OUTPATIENT)
Dept: OPHTHALMOLOGY | Facility: CLINIC | Age: 73
End: 2023-09-07
Attending: OPHTHALMOLOGY
Payer: MEDICARE

## 2023-09-07 DIAGNOSIS — H35.3130 BILATERAL NONEXUDATIVE AGE-RELATED MACULAR DEGENERATION, UNSPECIFIED STAGE: ICD-10-CM

## 2023-09-07 DIAGNOSIS — H35.3211 EXUDATIVE AGE-RELATED MACULAR DEGENERATION OF RIGHT EYE WITH ACTIVE CHOROIDAL NEOVASCULARIZATION (H): Primary | ICD-10-CM

## 2023-09-07 PROCEDURE — 67028 INJECTION EYE DRUG: CPT | Mod: RT | Performed by: OPHTHALMOLOGY

## 2023-09-07 PROCEDURE — 250N000011 HC RX IP 250 OP 636: Mod: JZ | Performed by: OPHTHALMOLOGY

## 2023-09-07 PROCEDURE — 99207 PR DROP WITH A PROCEDURE: CPT | Mod: 25 | Performed by: OPHTHALMOLOGY

## 2023-09-07 PROCEDURE — 92015 DETERMINE REFRACTIVE STATE: CPT | Mod: GY

## 2023-09-07 PROCEDURE — 92134 CPTRZ OPH DX IMG PST SGM RTA: CPT | Performed by: OPHTHALMOLOGY

## 2023-09-07 RX ADMIN — FARICIMAB 6 MG: 6 INJECTION, SOLUTION INTRAVITREAL at 16:25

## 2023-09-07 ASSESSMENT — REFRACTION_MANIFEST
OS_AXIS: 173
OS_SPHERE: -1.00
OS_CYLINDER: +1.25
OS_ADD: +2.75

## 2023-09-07 ASSESSMENT — CONF VISUAL FIELD
OS_INFERIOR_NASAL_RESTRICTION: 0
OS_SUPERIOR_TEMPORAL_RESTRICTION: 0
OD_INFERIOR_NASAL_RESTRICTION: 0
OD_NORMAL: 1
OD_SUPERIOR_NASAL_RESTRICTION: 0
OS_INFERIOR_TEMPORAL_RESTRICTION: 0
OD_SUPERIOR_TEMPORAL_RESTRICTION: 0
OD_INFERIOR_TEMPORAL_RESTRICTION: 0
OS_SUPERIOR_NASAL_RESTRICTION: 0
OS_NORMAL: 1
METHOD: COUNTING FINGERS

## 2023-09-07 ASSESSMENT — TONOMETRY
OS_IOP_MMHG: 24
IOP_METHOD: TONOPEN
OD_IOP_MMHG: 18

## 2023-09-07 ASSESSMENT — VISUAL ACUITY
OD_CC+: -2
OS_CC+: -1
METHOD: SNELLEN - LINEAR
OS_CC: 20/30
OS_PH_CC: 20/30
OD_CC: 20/40

## 2023-09-07 ASSESSMENT — SLIT LAMP EXAM - LIDS
COMMENTS: MGD
COMMENTS: MGD

## 2023-09-07 ASSESSMENT — EXTERNAL EXAM - LEFT EYE: OS_EXAM: NORMAL

## 2023-09-07 ASSESSMENT — REFRACTION_WEARINGRX
OS_SPHERE: -0.75
SPECS_TYPE: PAL
OS_CYLINDER: +1.75
OD_AXIS: 005
OD_CYLINDER: +1.50
OD_SPHERE: +0.25
OS_ADD: +2.75
OD_ADD: +2.75
OS_AXIS: 002

## 2023-09-07 ASSESSMENT — EXTERNAL EXAM - RIGHT EYE: OD_EXAM: NORMAL

## 2023-09-07 ASSESSMENT — CUP TO DISC RATIO
OD_RATIO: 0.3
OS_RATIO: 0.4

## 2023-09-07 NOTE — PROGRESS NOTES
CC: follow up  posterior capsular opacity (PCO) left eye     HPI:  History of Age related macular degeneration. s/p  CE/IOL right eye 9/11/18 and left eye 10/10/18  Metamorphopsia improved.     History of Frequent ocular migraines right eye; she experienced positive visual phenomena almost everyday and flashes of l ight     PMH: MS under control       Imaging    OCT 09/07/23   Right eye: drusen Retinal pigment epithelium changes, resolved subretinal fluid; PH attached  Left eye: few drusen; stable. VMA near fovea    ONFL 06/16/22   Right eye: within normal limits   Left eye: stable TS thinning    FAF 7-1-21  RE: mottled nasal macular hypo/hyperAF, stable  LE: minimal ST macular hypoAF, stable    OVF 24-2 06/12/23     Right eye: scattered spots of decreased sensitivity (non glaucomatous appearance) MD -5.2 (prior -3.5)  Left eye: scattered spots of decreased sensitivity; possible sup arcuate (non glaucomatous appearance) MD -11.3 (worse compared to prior -5.9)    Assessment & Plan:      # history of Recurrent Exudative Age related macular degeneration right eye    - With persistent exudative changes     - s/p previous Avastin (x4 in 7/9/2013)   - New exudative changes noted 04/03/23 with VA decreased to 20/60   - status post avastin x3 last 06/12/23    - started Vabysmo 7.12.23- 8.9.23 responding well   - recommend to continue Vabysmo inj x4 loading dose     # posterior chamber intraocular lens (PCIOL) both eyes   Status post yag laser left eye 08/30/23   Retina attached  09/07/23 prescription     # dry Age related macular degeneration left eye    - AREDS vitamins, Amsler grid monitoring weekly OCT stable, no injections needed    - (+) FH: both parents had Age related macular degeneration     # glaucoma suspect with mild cup:disc asymmetry   - IOP excellent   - ONFL normal right eye; stable TS thinning left eye    - OVF: scattered spots of decreased sensitivity (non glaucomatous appearance) both eyes    - observe  without  Eyedrops   - follow up in one yr. Could alternate glaucoma testing given low susp.    # Hx of Optic neuritis left eye   Previously evaluated with neuro-ophthalmology     # History of multiple sclerosis diagnosed several ys ago   History of uveitis both eyes - no activity today 07/01/21    History of taking IV IG  and steroids   currently without Treatment   Consider follow up with neurologist- per patient recommend to follow up with neuro-ophthalmologist    #- MGD each eye and Dry eye syndrome both eyes    - viscous discharged expressed from Meiboman glands   - warm compression discussed   - artificial tears  as needed     #Ocular migraines   Remote history of migraines, restarted 4/2023 with primarily visual symptoms. Occur almost daily. Sees a kaleidoscope of colors that lasts 3 minutes.    -Seen with Dr. Hernandez in the past   -Follows up with neurologist- per patient recommend to follow up with neuro-ophthalmologist      PLAN: .  Follow up in 4-5 weeks Vabysmo right eye only   Patient likes 3 rounds of lido   New prescription given 09/07/23       ~~~~~~~~~~~~~~~~~~~~~~~~~~~~~~~~~~   Complete documentation of historical and exam elements from today's encounter can be found in the full encounter summary report (not reduplicated in this progress note).  I personally obtained the chief complaint(s) and history of present illness.  I confirmed and edited as necessary the review of systems, past medical/surgical history, family history, social history, and examination findings as documented by others; and I examined the patient myself.  I personally reviewed the relevant tests, images, and reports as documented above.  I formulated and edited as necessary the assessment and plan and discussed the findings and management plan with the patient and family and No resident or fellow assisted with the procedures performed.  I performed the procedures myself.    Kesha Patel MD  .  Retina  Service   Department of Ophthalmology and Visual Neurosciences   [facility]  Phone: [phone]   Fax: [phone]

## 2023-09-07 NOTE — NURSING NOTE
Chief Complaints and History of Present Illnesses   Patient presents with    Exudative Macular Degeneration Follow Up     Chief Complaint(s) and History of Present Illness(es)       Exudative Macular Degeneration Follow Up              Laterality: right eye    Associated symptoms: dryness and floaters.  Negative for eye pain, tearing, flashes, itching and burning    Pain scale: 0/10              Comments    Sondra is here to continue care for exudative macular degeneration RE. She is here for an injection today. She says LE was very sore post LE laser at last visit.     [place] COT 2:50 PM September 7, 2023

## 2023-10-05 ENCOUNTER — OFFICE VISIT (OUTPATIENT)
Dept: OPHTHALMOLOGY | Facility: CLINIC | Age: 73
End: 2023-10-05
Attending: OPHTHALMOLOGY
Payer: MEDICARE

## 2023-10-05 DIAGNOSIS — H35.3211 EXUDATIVE AGE-RELATED MACULAR DEGENERATION OF RIGHT EYE WITH ACTIVE CHOROIDAL NEOVASCULARIZATION (H): Primary | ICD-10-CM

## 2023-10-05 PROCEDURE — 250N000011 HC RX IP 250 OP 636: Mod: JZ | Performed by: STUDENT IN AN ORGANIZED HEALTH CARE EDUCATION/TRAINING PROGRAM

## 2023-10-05 PROCEDURE — 67028 INJECTION EYE DRUG: CPT | Mod: RT | Performed by: OPHTHALMOLOGY

## 2023-10-05 PROCEDURE — 99207 PR DROP WITH A PROCEDURE: CPT | Performed by: OPHTHALMOLOGY

## 2023-10-05 RX ADMIN — FARICIMAB 6 MG: 6 INJECTION, SOLUTION INTRAVITREAL at 13:53

## 2023-10-05 ASSESSMENT — REFRACTION_WEARINGRX
OS_ADD: +2.75
OS_AXIS: 002
OD_CYLINDER: +1.50
SPECS_TYPE: PAL
OD_AXIS: 005
OS_CYLINDER: +1.75
OD_SPHERE: +0.25
OS_SPHERE: -0.75
OD_ADD: +2.75

## 2023-10-05 ASSESSMENT — VISUAL ACUITY
OD_CC+: -2
OD_CC: 20/40
OS_CC+: +2
OS_CC: 20/30
METHOD: SNELLEN - LINEAR

## 2023-10-05 ASSESSMENT — TONOMETRY
OS_IOP_MMHG: 21
IOP_METHOD: TONOPEN
OD_IOP_MMHG: 18

## 2023-10-05 NOTE — NURSING NOTE
Chief Complaints and History of Present Illnesses   Patient presents with    Macular Degeneration Follow Up     Chief Complaint(s) and History of Present Illness(es)       Macular Degeneration Follow Up              Laterality: both eyes    Onset: 4 weeks ago              Comments    Pt. States that there has been no change in VA BE. No pain BE. Has been seeing a small bubble RE since last injection.   Ame Paris COT 1:22 PM October 5, 2023

## 2023-10-05 NOTE — PROGRESS NOTES
CC: follow up  wet Age related macular degeneration right eye   Interval: here for inj only  HPI:  History of Age related macular degeneration. s/p  CE/IOL right eye 9/11/18 and left eye 10/10/18  Metamorphopsia improved.     History of Frequent ocular migraines right eye; she experienced positive visual phenomena almost everyday and flashes of l ight     PMH: MS under control       Imaging  OCT 09/07/23   Right eye: drusen Retinal pigment epithelium changes, resolved subretinal fluid; PH attached  Left eye: few drusen; stable. VMA near fovea    ONFL 06/16/22   Right eye: within normal limits   Left eye: stable TS thinning    FAF 7-1-21  RE: mottled nasal macular hypo/hyperAF, stable  LE: minimal ST macular hypoAF, stable    OVF 24-2 06/12/23     Right eye: scattered spots of decreased sensitivity (non glaucomatous appearance) MD -5.2 (prior -3.5)  Left eye: scattered spots of decreased sensitivity; possible sup arcuate (non glaucomatous appearance) MD -11.3 (worse compared to prior -5.9)    Assessment & Plan:      # history of Recurrent Exudative Age related macular degeneration right eye    - With persistent exudative changes     - s/p previous Avastin (x4 in 7/9/2013)   - New exudative changes noted 04/03/23 with VA decreased to 20/60   - status post avastin x3 last 06/12/23    - started Vabysmo 7.12.23- 8.9.23 responding well   - recommend to continue Vabysmo inj x4 loading dose     # posterior chamber intraocular lens (PCIOL) both eyes   Status post yag laser left eye 08/30/23   Retina attached  09/07/23 prescription     # dry Age related macular degeneration left eye    - AREDS vitamins, Amsler grid monitoring weekly OCT stable, no injections needed    - (+) FH: both parents had Age related macular degeneration     # glaucoma suspect with mild cup:disc asymmetry   - IOP excellent   - ONFL normal right eye; stable TS thinning left eye    - OVF: scattered spots of decreased sensitivity (non glaucomatous  appearance) both eyes    - observe without  Eyedrops   - follow up in one yr. Could alternate glaucoma testing given low susp.    # Hx of Optic neuritis left eye   Previously evaluated with neuro-ophthalmology     # History of multiple sclerosis diagnosed several ys ago   History of uveitis both eyes - no activity today 07/01/21    History of taking IV IG  and steroids   currently without Treatment   Consider follow up with neurologist- per patient recommend to follow up with neuro-ophthalmologist    #- MGD each eye and Dry eye syndrome both eyes    - viscous discharged expressed from Meiboman glands   - warm compression discussed   - artificial tears  as needed     #Ocular migraines   Remote history of migraines, restarted 4/2023 with primarily visual symptoms. Occur almost daily. Sees a kaleidoscope of colors that lasts 3 minutes.    -Seen with Dr. Hernandez in the past   -Follows up with neurologist- per patient recommend to follow up with neuro-ophthalmologist      PLAN: .  Follow up in 6-7 weeks Vabysmo right eye; Optical Coherence Tomography and dilation both eyes   Patient likes 3 rounds of lido   New prescription given 09/07/23       ~~~~~~~~~~~~~~~~~~~~~~~~~~~~~~~~~~   Complete documentation of historical and exam elements from today's encounter can be found in the full encounter summary report (not reduplicated in this progress note).  I personally obtained the chief complaint(s) and history of present illness.  I confirmed and edited as necessary the review of systems, past medical/surgical history, family history, social history, and examination findings as documented by others; and I examined the patient myself.  I personally reviewed the relevant tests, images, and reports as documented above.  I formulated and edited as necessary the assessment and plan and discussed the findings and management plan with the patient and family and No resident or fellow assisted with the procedures performed.  I performed  the procedures myself.    Kesha Patel MD  .  Retina Service   Department of Ophthalmology and Visual Neurosciences   [facility]  Phone: [phone]   Fax: [phone]

## 2023-10-17 DIAGNOSIS — H53.40 VISUAL FIELD DEFECT: Primary | ICD-10-CM

## 2023-11-09 DIAGNOSIS — H35.3211 EXUDATIVE AGE-RELATED MACULAR DEGENERATION OF RIGHT EYE WITH ACTIVE CHOROIDAL NEOVASCULARIZATION (H): Primary | ICD-10-CM

## 2023-11-16 ENCOUNTER — OFFICE VISIT (OUTPATIENT)
Dept: OPHTHALMOLOGY | Facility: CLINIC | Age: 73
End: 2023-11-16
Attending: OPHTHALMOLOGY
Payer: MEDICARE

## 2023-11-16 DIAGNOSIS — H35.3211 EXUDATIVE AGE-RELATED MACULAR DEGENERATION OF RIGHT EYE WITH ACTIVE CHOROIDAL NEOVASCULARIZATION (H): ICD-10-CM

## 2023-11-16 PROCEDURE — 250N000011 HC RX IP 250 OP 636: Mod: JZ | Performed by: STUDENT IN AN ORGANIZED HEALTH CARE EDUCATION/TRAINING PROGRAM

## 2023-11-16 PROCEDURE — 67028 INJECTION EYE DRUG: CPT | Mod: RT | Performed by: OPHTHALMOLOGY

## 2023-11-16 PROCEDURE — 92134 CPTRZ OPH DX IMG PST SGM RTA: CPT | Performed by: OPHTHALMOLOGY

## 2023-11-16 RX ADMIN — FARICIMAB 6 MG: 6 INJECTION, SOLUTION INTRAVITREAL at 14:06

## 2023-11-16 ASSESSMENT — REFRACTION_WEARINGRX
OD_SPHERE: +0.25
OS_SPHERE: -0.75
OS_ADD: +2.75
SPECS_TYPE: PAL
OD_ADD: +2.75
OD_CYLINDER: +1.50
OD_AXIS: 005
OS_CYLINDER: +1.75
OS_AXIS: 002

## 2023-11-16 ASSESSMENT — VISUAL ACUITY
OD_CC: 20/40
OS_CC: 20/25
METHOD: SNELLEN - LINEAR
OS_CC+: -2
CORRECTION_TYPE: GLASSES
OD_CC+: -2

## 2023-11-16 ASSESSMENT — TONOMETRY
OD_IOP_MMHG: 16
OS_IOP_MMHG: 21
IOP_METHOD: TONOPEN

## 2023-11-16 ASSESSMENT — CUP TO DISC RATIO
OD_RATIO: 0.3
OS_RATIO: 0.4

## 2023-11-16 ASSESSMENT — SLIT LAMP EXAM - LIDS
COMMENTS: MGD
COMMENTS: MGD

## 2023-11-16 ASSESSMENT — EXTERNAL EXAM - RIGHT EYE: OD_EXAM: NORMAL

## 2023-11-16 ASSESSMENT — EXTERNAL EXAM - LEFT EYE: OS_EXAM: NORMAL

## 2023-11-16 NOTE — NURSING NOTE
Chief Complaints and History of Present Illnesses   Patient presents with    Follow Up     Exudative age-related macular degeneration of right eye with active choroidal neovascularization     Chief Complaint(s) and History of Present Illness(es)       Follow Up              Comments: Exudative age-related macular degeneration of right eye with active choroidal neovascularization              Comments    Pt states no change in VA since last visit  Pt sates floaters same as before   No flashes, eye pain or redness    Cindy Palmer COT 1:13 PM November 16, 2023

## 2023-11-16 NOTE — PROGRESS NOTES
CC: follow up  wet Age related macular degeneration right eye   Interval: here for inj only  HPI:  History of Age related macular degeneration. s/p  CE/IOL right eye 9/11/18 and left eye 10/10/18  Metamorphopsia improved. VA improved    History of Frequent ocular migraines right eye; she experienced positive visual phenomena almost everyday and flashes of l ight     PMH: MS under control       Imaging  OCT 11/16/23   Right eye: drusen Retinal pigment epithelium changes, resolved subretinal fluid; PH attached  Left eye: few drusen; stable. VMA near fovea    ONFL 06/16/22   Right eye: within normal limits   Left eye: stable TS thinning    FAF 7-1-21  RE: mottled nasal macular hypo/hyperAF, stable  LE: minimal ST macular hypoAF, stable    OVF 24-2 06/12/23     Right eye: scattered spots of decreased sensitivity (non glaucomatous appearance) MD -5.2 (prior -3.5)  Left eye: scattered spots of decreased sensitivity; possible sup arcuate (non glaucomatous appearance) MD -11.3 (worse compared to prior -5.9)    Assessment & Plan:      # history of Recurrent Exudative Age related macular degeneration right eye    - With persistent exudative changes     - s/p previous Avastin (x4 in 7/9/2013)   - New exudative changes noted 04/03/23 with VA decreased to 20/60   - status post avastin x3 last 06/12/23    - started Vabysmo 7.12.23- 8.9.23 10.5.23 11/16/23 responding well x4 loading dose    - recommend T&E Vabysmo inj in 8-9 weeks    # posterior chamber intraocular lens (PCIOL) both eyes   Status post yag laser left eye 08/30/23   Retina attached  09/07/23 prescription     # dry Age related macular degeneration left eye    - AREDS vitamins, Amsler grid monitoring weekly OCT stable, no injections needed    - (+) FH: both parents had Age related macular degeneration     # glaucoma suspect with mild cup:disc asymmetry   - IOP excellent   - ONFL normal right eye; stable TS thinning left eye    - OVF: scattered spots of decreased  sensitivity (non glaucomatous appearance) both eyes    - observe without  Eyedrops   - follow up in one yr. Could alternate glaucoma testing given low susp.    # Hx of Optic neuritis left eye   Previously evaluated with neuro-ophthalmology     # History of multiple sclerosis diagnosed several ys ago   History of uveitis both eyes - no activity today 07/01/21    History of taking IV IG  and steroids   currently without Treatment   Consider follow up with neurologist- per patient recommend to follow up with neuro-ophthalmologist    #- MGD each eye and Dry eye syndrome both eyes    - viscous discharged expressed from Meiboman glands   - warm compression discussed   - artificial tears  as needed     #Ocular migraines   Remote history of migraines, restarted 4/2023 with primarily visual symptoms. Occur almost daily. Sees a kaleidoscope of colors that lasts 3 minutes.    -Seen with Dr. Hernandez in the past   -Follows up with neurologist- per patient recommend to follow up with neuro-ophthalmologist      PLAN: .  Follow up in 8-9 weeks Vabysmo right eye; Optical Coherence Tomography and NO dilation both  Dilated 11/16/23 - consider dilation every other ciro or third ciro and as needed     Patient likes 3 rounds of lido   New prescription given 09/07/23       ~~~~~~~~~~~~~~~~~~~~~~~~~~~~~~~~~~   Complete documentation of historical and exam elements from today's encounter can be found in the full encounter summary report (not reduplicated in this progress note).  I personally obtained the chief complaint(s) and history of present illness.  I confirmed and edited as necessary the review of systems, past medical/surgical history, family history, social history, and examination findings as documented by others; and I examined the patient myself.  I personally reviewed the relevant tests, images, and reports as documented above.  I formulated and edited as necessary the assessment and plan and discussed the findings and management  plan with the patient and family and No resident or fellow assisted with the procedures performed.  I performed the procedures myself.    Kesha Patel MD  .  Retina Service   Department of Ophthalmology and Visual Neurosciences   [facility]  Phone: [phone]   Fax: [phone]

## 2024-01-05 DIAGNOSIS — H35.3211 EXUDATIVE AGE-RELATED MACULAR DEGENERATION OF RIGHT EYE WITH ACTIVE CHOROIDAL NEOVASCULARIZATION (H): Primary | ICD-10-CM

## 2024-01-18 ENCOUNTER — OFFICE VISIT (OUTPATIENT)
Dept: OPHTHALMOLOGY | Facility: CLINIC | Age: 74
End: 2024-01-18
Attending: OPHTHALMOLOGY
Payer: MEDICARE

## 2024-01-18 DIAGNOSIS — H35.3211 EXUDATIVE AGE-RELATED MACULAR DEGENERATION OF RIGHT EYE WITH ACTIVE CHOROIDAL NEOVASCULARIZATION (H): ICD-10-CM

## 2024-01-18 PROCEDURE — 92134 CPTRZ OPH DX IMG PST SGM RTA: CPT | Performed by: OPHTHALMOLOGY

## 2024-01-18 PROCEDURE — 250N000011 HC RX IP 250 OP 636: Mod: JZ | Performed by: OPHTHALMOLOGY

## 2024-01-18 PROCEDURE — 99207 PR DROP WITH A PROCEDURE: CPT | Performed by: OPHTHALMOLOGY

## 2024-01-18 PROCEDURE — 67028 INJECTION EYE DRUG: CPT | Mod: RT | Performed by: OPHTHALMOLOGY

## 2024-01-18 RX ADMIN — FARICIMAB 6 MG: 6 INJECTION, SOLUTION INTRAVITREAL at 14:25

## 2024-01-18 ASSESSMENT — VISUAL ACUITY
OS_CC+: +2
OD_CC+: +2
OD_CC: 20/30
METHOD: SNELLEN - LINEAR
CORRECTION_TYPE: GLASSES
OS_CC: 20/25

## 2024-01-18 ASSESSMENT — CUP TO DISC RATIO
OD_RATIO: 0.3
OS_RATIO: 0.4

## 2024-01-18 ASSESSMENT — TONOMETRY
OD_IOP_MMHG: 14
OS_IOP_MMHG: 18
IOP_METHOD: TONOPEN

## 2024-01-18 ASSESSMENT — EXTERNAL EXAM - LEFT EYE: OS_EXAM: NORMAL

## 2024-01-18 ASSESSMENT — SLIT LAMP EXAM - LIDS
COMMENTS: MGD
COMMENTS: MGD

## 2024-01-18 ASSESSMENT — EXTERNAL EXAM - RIGHT EYE: OD_EXAM: NORMAL

## 2024-01-18 NOTE — NURSING NOTE
Chief Complaints and History of Present Illnesses   Patient presents with    Exudative Macular Degeneration Follow Up     Chief Complaint(s) and History of Present Illness(es)       Exudative Macular Degeneration Follow Up              Laterality: right eye    Onset: gradual    Onset: 2 months ago    Severity: mild    Frequency: constantly    Course: gradually improving    Associated symptoms: flashes and floaters.  Negative for eye pain    Treatments tried: no treatments    Response to treatment: no improvement    Pain scale: 0/10              Comments    Patient feels vision is somewhat improved.  Occasional flashes and floaters but nothing new.  Denies eye pain.  Has occasional ocular migraines with flashing light.  No associated headache.      Cyndi Dobbins on 1/18/2024 at 1:10 PM

## 2024-01-18 NOTE — PROGRESS NOTES
CC: follow up  wet Age related macular degeneration right eye   Interval: here for inj only  HPI:  History of Age related macular degeneration. s/p  CE/IOL right eye 9/11/18 and left eye 10/10/18  Metamorphopsia improved. VA improved    History of Frequent ocular migraines right eye; she experienced positive visual phenomena almost everyday and flashes of l ight     PMH: MS under control       Imaging  OCT 11/16/23   Right eye: drusen Retinal pigment epithelium changes, resolving subretinal fluid; PH attached  Left eye: few drusen; stable. VMA near fovea    ONFL 06/16/22   Right eye: within normal limits   Left eye: stable TS thinning    FAF 7-1-21  RE: mottled nasal macular hypo/hyperAF, stable  LE: minimal ST macular hypoAF, stable    OVF 24-2 06/12/23     Right eye: scattered spots of decreased sensitivity (non glaucomatous appearance) MD -5.2 (prior -3.5)  Left eye: scattered spots of decreased sensitivity; possible sup arcuate (non glaucomatous appearance) MD -11.3 (worse compared to prior -5.9)    Assessment:      # history of Recurrent Exudative Age related macular degeneration right eye    - With persistent exudative changes     - s/p previous Avastin (x4 in 7/9/2013)   - New exudative changes noted 04/03/23 with VA decreased to 20/60   - status post avastin x3 last 06/12/23    - started Vabysmo 7.12.23- 8.9.23 10.5.23 11/16/23 responding well x4 loading dose    - recommend T&E Vabysmo inj in 10-11 weeks    # posterior chamber intraocular lens (PCIOL) both eyes   Status post yag laser left eye 08/30/23   Retina attached  09/07/23 prescription     # dry Age related macular degeneration left eye    - AREDS vitamins, Amsler grid monitoring weekly OCT stable, no injections needed    - (+) FH: both parents had Age related macular degeneration     # glaucoma suspect with mild cup:disc asymmetry   - IOP excellent   - ONFL normal right eye; stable TS thinning left eye    - OVF: scattered spots of decreased  sensitivity (non glaucomatous appearance) both eyes    - observe without  Eyedrops   - follow up in one yr. Could alternate glaucoma testing given low susp.    # Hx of Optic neuritis left eye   Previously evaluated with neuro-ophthalmology     # History of multiple sclerosis diagnosed several ys ago   History of uveitis both eyes - no activity today 07/01/21    History of taking IV IG  and steroids   currently without Treatment   Consider follow up with neurologist- per patient recommend to follow up with neuro-ophthalmologist    #- MGD each eye and Dry eye syndrome both eyes    - viscous discharged expressed from Meiboman glands   - warm compression discussed   - artificial tears  as needed     #Ocular migraines   Remote history of migraines, restarted 4/2023 with primarily visual symptoms. Occur almost daily. Sees a kaleidoscope of colors that lasts 3 minutes.    -Seen with Dr. Hernandez in the past   -Follows up with neurologist- per patient recommend to follow up with neuro-ophthalmologist      PLAN: .  Follow up in 10-11 weeks Vabysmo right eye; Optical Coherence Tomography and dilation both  Dilated 11/16/23 - consider dilation every other ciro or third ciro and as needed     Patient likes 3 rounds of lido   New prescription given 09/07/23       ~~~~~~~~~~~~~~~~~~~~~~~~~~~~~~~~~~   Complete documentation of historical and exam elements from today's encounter can be found in the full encounter summary report (not reduplicated in this progress note).  I personally obtained the chief complaint(s) and history of present illness.  I confirmed and edited as necessary the review of systems, past medical/surgical history, family history, social history, and examination findings as documented by others; and I examined the patient myself.  I personally reviewed the relevant tests, images, and reports as documented above.  I formulated and edited as necessary the assessment and plan and discussed the findings and management plan  with the patient and family and No resident or fellow assisted with the procedures performed.  I performed the procedures myself.    Kesha Patel MD  .  Retina Service   Department of Ophthalmology and Visual Neurosciences   [facility]  Phone: [phone]   Fax: [phone]

## 2024-02-22 ENCOUNTER — OFFICE VISIT (OUTPATIENT)
Dept: OPHTHALMOLOGY | Facility: CLINIC | Age: 74
End: 2024-02-22
Attending: OPHTHALMOLOGY
Payer: MEDICARE

## 2024-02-22 DIAGNOSIS — H53.40 VISUAL FIELD DEFECT: Primary | ICD-10-CM

## 2024-02-22 DIAGNOSIS — H53.10 SUBJECTIVE VISION DISTURBANCE: ICD-10-CM

## 2024-02-22 DIAGNOSIS — H47.20 OPTIC ATROPHY: Primary | ICD-10-CM

## 2024-02-22 PROCEDURE — 92083 EXTENDED VISUAL FIELD XM: CPT | Performed by: OPHTHALMOLOGY

## 2024-02-22 PROCEDURE — G0463 HOSPITAL OUTPT CLINIC VISIT: HCPCS | Performed by: OPHTHALMOLOGY

## 2024-02-22 PROCEDURE — 92133 CPTRZD OPH DX IMG PST SGM ON: CPT | Performed by: OPHTHALMOLOGY

## 2024-02-22 PROCEDURE — 92014 COMPRE OPH EXAM EST PT 1/>: CPT | Mod: GC | Performed by: OPHTHALMOLOGY

## 2024-02-22 ASSESSMENT — CONF VISUAL FIELD
OS_SUPERIOR_TEMPORAL_RESTRICTION: 0
METHOD: COUNTING FINGERS
OD_SUPERIOR_NASAL_RESTRICTION: 0
OS_SUPERIOR_NASAL_RESTRICTION: 0
OS_INFERIOR_TEMPORAL_RESTRICTION: 0
OS_NORMAL: 1
OD_INFERIOR_TEMPORAL_RESTRICTION: 0
OD_INFERIOR_NASAL_RESTRICTION: 0
OD_NORMAL: 1
OD_SUPERIOR_TEMPORAL_RESTRICTION: 0
OS_INFERIOR_NASAL_RESTRICTION: 0

## 2024-02-22 ASSESSMENT — TONOMETRY
IOP_METHOD: ICARE
OS_IOP_MMHG: 14
OD_IOP_MMHG: 16

## 2024-02-22 ASSESSMENT — CUP TO DISC RATIO
OD_RATIO: 0.3
OS_RATIO: 0.6

## 2024-02-22 ASSESSMENT — VISUAL ACUITY
OS_CC: 20/20
CORRECTION_TYPE: GLASSES
METHOD: SNELLEN - LINEAR
OD_CC+: -3
OS_CC+: -1
OD_CC: 20/25

## 2024-02-22 ASSESSMENT — REFRACTION_WEARINGRX
OS_SPHERE: -0.75
OS_AXIS: 002
SPECS_TYPE: PAL
OD_SPHERE: +0.25
OD_AXIS: 005
OD_CYLINDER: +1.50
OS_ADD: +2.75
OD_ADD: +2.75
OS_CYLINDER: +1.75

## 2024-02-22 ASSESSMENT — SLIT LAMP EXAM - LIDS
COMMENTS: MGD
COMMENTS: MGD

## 2024-02-22 ASSESSMENT — EXTERNAL EXAM - LEFT EYE: OS_EXAM: NORMAL

## 2024-02-22 ASSESSMENT — EXTERNAL EXAM - RIGHT EYE: OD_EXAM: NORMAL

## 2024-02-22 NOTE — PROGRESS NOTES
Assessment & Plan     Sondra Castro is a 73 year old female with the following diagnoses:   1. Optic atrophy    2. Subjective vision disturbance         Follow up optic neuritis.  Her last visit was November 2020.  She has a history of multiple sclerosis. Since her last visit she's being treated for wet macular degeneration in her right eye. She's also noticed that the image she sees with her ocular migraines has changed but it's the same repeatable images and occurs in both eyes. She does get a headache sometimes with these visual auras but not a migraine.  She denies any flare ups of her optic neuritis since her last visit. She is not currently on any medications for her MS. Her neurologist Eliana Soliz at [facility] wanted patient to be seen again by neuro-ophthalmology.     VA 20/25 OD, 20/20 OS. Color vision full each eye. Pupils no rAPD. Anterior segment wnl for age. Posterior segment significant for drusen and RPE changes each eye, left optic nerve pallor and enlarged c/d ratio. Strabismus N/A.     OCT RNFL:  stable wnl for age OD, stable superior thinning OS   VF: superior altitudinal defect stable OD, superior arcuate stable OS       Patient with MS with history of optic neuritis here for follow-up at the request of her neurologist. Patient is asymptomatic of reoccurence of optic neuritis. RNFL and VF testing is stable compared to last visit in 2020. Recommend patient continue to follow up with retina for management of macular degeneration. Patient has photisms.  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  We discussed the benign nature of these.             Attending Physician Attestation:  Complete documentation of historical and exam elements from today's encounter can be found in the full encounter summary report (not reduplicated in this progress note).  I personally obtained the chief complaint(s) and history of present illness.  I confirmed and  edited as necessary the review of systems, past medical/surgical history, family history, social history, and examination findings as documented by others; and I examined the patient myself.  I personally reviewed the relevant tests, images, and reports as documented above.  I formulated and edited as necessary the assessment and plan and discussed the findings and management plan with the patient and family. - Bradley Bull MD   Fellow, Neuro-Ophthalmology

## 2024-03-19 DIAGNOSIS — H35.3211 EXUDATIVE AGE-RELATED MACULAR DEGENERATION OF RIGHT EYE WITH ACTIVE CHOROIDAL NEOVASCULARIZATION (H): Primary | ICD-10-CM

## 2024-03-28 ENCOUNTER — OFFICE VISIT (OUTPATIENT)
Dept: OPHTHALMOLOGY | Facility: CLINIC | Age: 74
End: 2024-03-28
Attending: OPHTHALMOLOGY
Payer: MEDICARE

## 2024-03-28 DIAGNOSIS — H35.3211 EXUDATIVE AGE-RELATED MACULAR DEGENERATION OF RIGHT EYE WITH ACTIVE CHOROIDAL NEOVASCULARIZATION (H): ICD-10-CM

## 2024-03-28 PROCEDURE — 250N000011 HC RX IP 250 OP 636: Mod: JZ | Performed by: STUDENT IN AN ORGANIZED HEALTH CARE EDUCATION/TRAINING PROGRAM

## 2024-03-28 PROCEDURE — 92134 CPTRZ OPH DX IMG PST SGM RTA: CPT | Performed by: OPHTHALMOLOGY

## 2024-03-28 PROCEDURE — 67028 INJECTION EYE DRUG: CPT | Mod: RT | Performed by: OPHTHALMOLOGY

## 2024-03-28 PROCEDURE — 99207 PR DROP WITH A PROCEDURE: CPT | Performed by: OPHTHALMOLOGY

## 2024-03-28 RX ADMIN — FARICIMAB 6 MG: 6 INJECTION, SOLUTION INTRAVITREAL at 14:00

## 2024-03-28 ASSESSMENT — CUP TO DISC RATIO
OD_RATIO: 0.3
OS_RATIO: 0.6

## 2024-03-28 ASSESSMENT — REFRACTION_WEARINGRX
OD_CYLINDER: +1.50
OS_CYLINDER: +1.75
OS_ADD: +2.75
OD_ADD: +2.75
OD_SPHERE: +0.25
OD_AXIS: 005
OS_SPHERE: -0.75
OS_AXIS: 002
SPECS_TYPE: PAL

## 2024-03-28 ASSESSMENT — SLIT LAMP EXAM - LIDS
COMMENTS: MGD
COMMENTS: MGD

## 2024-03-28 ASSESSMENT — VISUAL ACUITY
OD_CC: 20/30
OS_CC+: -1
OD_CC+: -1
OS_CC: 20/25
CORRECTION_TYPE: GLASSES
METHOD: SNELLEN - LINEAR

## 2024-03-28 ASSESSMENT — EXTERNAL EXAM - LEFT EYE: OS_EXAM: NORMAL

## 2024-03-28 ASSESSMENT — TONOMETRY
OS_IOP_MMHG: 21
OD_IOP_MMHG: 19
IOP_METHOD: TONOPEN

## 2024-03-28 ASSESSMENT — EXTERNAL EXAM - RIGHT EYE: OD_EXAM: NORMAL

## 2024-03-28 NOTE — NURSING NOTE
Chief Complaints and History of Present Illnesses   Patient presents with    Follow Up     Exudative age-related macular degeneration of right eye with active choroidal neovascularization      Chief Complaint(s) and History of Present Illness(es)       Follow Up              Comments: Exudative age-related macular degeneration of right eye with active choroidal neovascularization               Comments    Pt states no change in VA since last visit  States no flashes or floaters   No eye pain or redness    Cindy Palmer COT 1:30 PM March 28, 2024

## 2024-03-28 NOTE — PROGRESS NOTES
CC: follow up  wet Age related macular degeneration right eye   Interval: here for inj only  HPI:  History of Age related macular degeneration. s/p  CE/IOL right eye 9/11/18 and left eye 10/10/18  Metamorphopsia improved. VA improved    History of Frequent ocular migraines right eye; she experienced positive visual phenomena almost everyday and flashes of l ight     PMH: MS under control       Imaging  OCT 03/28/24   Right eye: drusen Retinal pigment epithelium changes, resolving subretinal fluid; PH attached with mild VMT  Left eye: few drusen; stable. VMA near fovea    ONFL 06/16/22   Right eye: within normal limits   Left eye: stable TS thinning    FAF 7-1-21  RE: mottled nasal macular hypo/hyperAF, stable  LE: minimal ST macular hypoAF, stable    OVF 24-2 06/12/23     Right eye: scattered spots of decreased sensitivity (non glaucomatous appearance) MD -5.2 (prior -3.5)  Left eye: scattered spots of decreased sensitivity; possible sup arcuate (non glaucomatous appearance) MD -11.3 (worse compared to prior -5.9)    Assessment:  # history of Recurrent Exudative Age related macular degeneration right eye    - With persistent exudative changes     - s/p previous Avastin (x4 in 7/9/2013)   - New exudative changes noted 04/03/23 with VA decreased to 20/60   - status post avastin x3 last 06/12/23    - started Vabysmo 7.12.23- 8.9.23 10.5.23 11/16/23 responding well x4 loading dose; last inj 1.18.24   - recommend T&E Vabysmo inj 12 weeks    # posterior chamber intraocular lens (PCIOL) both eyes   Status post yag laser left eye 08/30/23   Retina attached  09/07/23 prescription     # dry Age related macular degeneration left eye    - AREDS vitamins, Amsler grid monitoring weekly OCT stable, no injections needed    - (+) FH: both parents had Age related macular degeneration     # glaucoma suspect with mild cup:disc asymmetry   - IOP excellent   - ONFL normal right eye; stable TS thinning left eye    - OVF: scattered spots  of decreased sensitivity (non glaucomatous appearance) both eyes    - observe without  Eyedrops   - follow up in one yr. Could alternate glaucoma testing given low susp.    # Hx of Optic neuritis left eye   Previously evaluated with neuro-ophthalmology     # History of multiple sclerosis diagnosed several ys ago   History of uveitis both eyes - no activity today 07/01/21    History of taking IV IG  and steroids   currently without Treatment   Consider follow up with neurologist- per patient recommend to follow up with neuro-ophthalmologist    #- MGD each eye and Dry eye syndrome both eyes    - viscous discharged expressed from Meiboman glands   - warm compression discussed   - artificial tears  as needed     #Ocular migraines   Remote history of migraines, restarted 4/2023 with primarily visual symptoms. Occur almost daily. Sees a kaleidoscope of colors that lasts 3 minutes.    -Seen with Dr. Hernandez in the past   -Follows up with neurologist- per patient recommend to follow up with neuro-ophthalmologist      PLAN: .  Follow up in 12 weeks Vabysmo right eye; Optical Coherence Tomography; no  dilation     Dilated 11/16/23 - consider dilation every other ciro  and as needed     Patient likes 3 rounds of lido   New prescription given 09/07/23       ~~~~~~~~~~~~~~~~~~~~~~~~~~~~~~~~~~   Complete documentation of historical and exam elements from today's encounter can be found in the full encounter summary report (not reduplicated in this progress note).  I personally obtained the chief complaint(s) and history of present illness.  I confirmed and edited as necessary the review of systems, past medical/surgical history, family history, social history, and examination findings as documented by others; and I examined the patient myself.  I personally reviewed the relevant tests, images, and reports as documented above.  I formulated and edited as necessary the assessment and plan and discussed the findings and management plan  with the patient and family and No resident or fellow assisted with the procedures performed.  I performed the procedures myself.    Kesha Patel MD  .  Retina Service   Department of Ophthalmology and Visual Neurosciences   [facility]  Phone: [phone]   Fax: [phone]

## 2024-06-06 DIAGNOSIS — H35.3211 EXUDATIVE AGE-RELATED MACULAR DEGENERATION OF RIGHT EYE WITH ACTIVE CHOROIDAL NEOVASCULARIZATION (H): Primary | ICD-10-CM

## 2024-06-20 ENCOUNTER — OFFICE VISIT (OUTPATIENT)
Dept: OPHTHALMOLOGY | Facility: CLINIC | Age: 74
End: 2024-06-20
Attending: OPHTHALMOLOGY
Payer: MEDICARE

## 2024-06-20 DIAGNOSIS — H35.3211 EXUDATIVE AGE-RELATED MACULAR DEGENERATION OF RIGHT EYE WITH ACTIVE CHOROIDAL NEOVASCULARIZATION (H): Primary | ICD-10-CM

## 2024-06-20 PROCEDURE — 92134 CPTRZ OPH DX IMG PST SGM RTA: CPT | Performed by: OPHTHALMOLOGY

## 2024-06-20 PROCEDURE — 99207 PR DROP WITH A PROCEDURE: CPT | Performed by: OPHTHALMOLOGY

## 2024-06-20 PROCEDURE — 67028 INJECTION EYE DRUG: CPT | Mod: RT | Performed by: OPHTHALMOLOGY

## 2024-06-20 PROCEDURE — 250N000011 HC RX IP 250 OP 636: Mod: JZ | Performed by: OPHTHALMOLOGY

## 2024-06-20 RX ADMIN — FARICIMAB 6 MG: 6 INJECTION, SOLUTION INTRAVITREAL at 13:55

## 2024-06-20 ASSESSMENT — VISUAL ACUITY
CORRECTION_TYPE: GLASSES
OD_CC+: -3
OS_CC: 20/20
OS_CC+: -3
OD_CC: 20/25
METHOD: SNELLEN - LINEAR

## 2024-06-20 ASSESSMENT — REFRACTION_WEARINGRX
OD_AXIS: 174
OS_AXIS: 176
OS_CYLINDER: +1.00
SPECS_TYPE: PAL
OD_SPHERE: +0.75
OS_SPHERE: -0.50
OD_CYLINDER: +1.50

## 2024-06-20 ASSESSMENT — EXTERNAL EXAM - RIGHT EYE: OD_EXAM: NORMAL

## 2024-06-20 ASSESSMENT — EXTERNAL EXAM - LEFT EYE: OS_EXAM: NORMAL

## 2024-06-20 ASSESSMENT — SLIT LAMP EXAM - LIDS
COMMENTS: MGD
COMMENTS: MGD

## 2024-06-20 ASSESSMENT — TONOMETRY
OD_IOP_MMHG: 11
OS_IOP_MMHG: 17
IOP_METHOD: TONOPEN

## 2024-06-20 NOTE — NURSING NOTE
Chief Complaints and History of Present Illnesses   Patient presents with    Exudative Macular Degeneration Follow Up     Exudative age-related macular degeneration of right eye with active choroidal neovascularization     Chief Complaint(s) and History of Present Illness(es)       Exudative Macular Degeneration Follow Up              Laterality: right eye    Associated symptoms: dryness and floaters.  Negative for eye pain, tearing and flashes    Pain scale: 0/10    Comments: Exudative age-related macular degeneration of right eye with active choroidal neovascularization              Comments    Pt thinks vision is a little better.  No pain.  No flashes.  No change to floaters.  AT's PRN.    JUAN RAMON Samuels June 20, 2024 1:05 PM

## 2024-06-20 NOTE — PROGRESS NOTES
CC: follow up  wet Age related macular degeneration right eye   Interval: here for inj only right eye  HPI:  History of Age related macular degeneration. s/p  CE/IOL right eye 9/11/18 and left eye 10/10/18  Metamorphopsia is improving in the right eye.    History of Frequent ocular migraines right eye; she experienced positive visual phenomena almost everyday and flashes of l ight     PMH: MS under control       Imaging  OCT 06/20/24  Right eye: drusen Retinal pigment epithelium changes, stable vs slightly worsened SRF; PH attached with mild VMT  Left eye: few drusen; stable. VMA near fovea    ONFL 06/16/22   Right eye: within normal limits   Left eye: stable TS thinning    FAF 7-1-21  RE: mottled nasal macular hypo/hyperAF, stable  LE: minimal ST macular hypoAF, stable    OVF 24-2 06/12/23     Right eye: scattered spots of decreased sensitivity (non glaucomatous appearance) MD -5.2 (prior -3.5)  Left eye: scattered spots of decreased sensitivity; possible sup arcuate (non glaucomatous appearance) MD -11.3 (worse compared to prior -5.9)    Assessment:  # history of Recurrent Exudative Age related macular degeneration right eye    - With persistent exudative changes     - s/p previous Avastin (x4 in 7/9/2013)   - New exudative changes noted 04/03/23 with VA decreased to 20/60   - status post avastin x3 last 06/12/23    - started Vabysmo 7.12.23- 8.9.23 10.5.23 11/16/23 responding well x4 loading dose; last inj 3.28.24   - recommend T&E Vabysmo inj 12 weeks    # posterior chamber intraocular lens (PCIOL) both eyes   Status post yag laser left eye 08/30/23   Retina attached  09/07/23 prescription     # dry Age related macular degeneration left eye    - AREDS vitamins, Amsler grid monitoring weekly OCT stable, no injections needed    - (+) FH: both parents had Age related macular degeneration     # glaucoma suspect with mild cup:disc asymmetry   - IOP excellent   - ONFL normal right eye; stable TS thinning left eye    -  OVF: scattered spots of decreased sensitivity (non glaucomatous appearance) both eyes    - observe without  Eyedrops   - follow up in one yr. Could alternate glaucoma testing given low susp.    # Hx of Optic neuritis left eye   Previously evaluated with neuro-ophthalmology     # History of multiple sclerosis diagnosed several ys ago   History of uveitis both eyes - no activity today 07/01/21    History of taking IV IG  and steroids   currently without Treatment   Consider follow up with neurologist- per patient recommend to follow up with neuro-ophthalmologist    #- MGD each eye and Dry eye syndrome both eyes    - viscous discharged expressed from Meiboman glands   - warm compression discussed   - artificial tears as needed     #Ocular migraines   Remote history of migraines, restarted 4/2023 with primarily visual symptoms. Occur almost daily. Sees a kaleidoscope of colors that lasts 3 minutes.    -Seen with Dr. Hernandez in the past   -Follows up with neurologist- per patient recommend to follow up with neuro-ophthalmologist      PLAN: .  Follow up in 12 weeks Vabysmo right eye; Optical Coherence Tomography; dilation     Dilated  3/28/24 - consider dilation every other ciro  and as needed     Patient likes 3 rounds of lido   New prescription given 09/07/23     Kendal Rojas MD  PGY3 Ophthalmology      ~~~~~~~~~~~~~~~~~~~~~~~~~~~~~~~~~~   Complete documentation of historical and exam elements from today's encounter can be found in the full encounter summary report (not reduplicated in this progress note).  I personally obtained the chief complaint(s) and history of present illness.  I confirmed and edited as necessary the review of systems, past medical/surgical history, family history, social history, and examination findings as documented by others; and I examined the patient myself.  I personally reviewed the relevant tests, images, and reports as documented above.  I formulated and edited as necessary the assessment  and plan and discussed the findings and management plan with the patient and family and No resident or fellow assisted with the procedures performed.  I performed the procedures myself.    Kesha Patel MD  .  Retina Service   Department of Ophthalmology and Visual Neurosciences   [facility]  Phone: [phone]   Fax: [phone]

## 2024-08-29 DIAGNOSIS — H35.3211 EXUDATIVE AGE-RELATED MACULAR DEGENERATION OF RIGHT EYE WITH ACTIVE CHOROIDAL NEOVASCULARIZATION (H): Primary | ICD-10-CM

## 2024-09-12 ENCOUNTER — OFFICE VISIT (OUTPATIENT)
Dept: OPHTHALMOLOGY | Facility: CLINIC | Age: 74
End: 2024-09-12
Attending: OPHTHALMOLOGY
Payer: MEDICARE

## 2024-09-12 DIAGNOSIS — H35.3211 EXUDATIVE AGE-RELATED MACULAR DEGENERATION OF RIGHT EYE WITH ACTIVE CHOROIDAL NEOVASCULARIZATION (H): ICD-10-CM

## 2024-09-12 PROCEDURE — 92134 CPTRZ OPH DX IMG PST SGM RTA: CPT | Performed by: OPHTHALMOLOGY

## 2024-09-12 PROCEDURE — 250N000011 HC RX IP 250 OP 636: Mod: JZ | Performed by: OPHTHALMOLOGY

## 2024-09-12 PROCEDURE — 67028 INJECTION EYE DRUG: CPT | Mod: RT | Performed by: OPHTHALMOLOGY

## 2024-09-12 PROCEDURE — G0463 HOSPITAL OUTPT CLINIC VISIT: HCPCS | Performed by: OPHTHALMOLOGY

## 2024-09-12 RX ADMIN — FARICIMAB 6 MG: 6 INJECTION, SOLUTION INTRAVITREAL at 13:42

## 2024-09-12 ASSESSMENT — SLIT LAMP EXAM - LIDS
COMMENTS: MGD
COMMENTS: MGD

## 2024-09-12 ASSESSMENT — CONF VISUAL FIELD
OD_NORMAL: 1
OS_NORMAL: 1
OD_SUPERIOR_TEMPORAL_RESTRICTION: 0
OD_INFERIOR_NASAL_RESTRICTION: 0
OS_INFERIOR_TEMPORAL_RESTRICTION: 0
OS_INFERIOR_NASAL_RESTRICTION: 0
OD_SUPERIOR_NASAL_RESTRICTION: 0
OS_SUPERIOR_TEMPORAL_RESTRICTION: 0
OD_INFERIOR_TEMPORAL_RESTRICTION: 0
OS_SUPERIOR_NASAL_RESTRICTION: 0
METHOD: COUNTING FINGERS

## 2024-09-12 ASSESSMENT — REFRACTION_WEARINGRX
SPECS_TYPE: PAL
OS_SPHERE: -0.50
OS_AXIS: 176
OD_SPHERE: +0.75
OD_CYLINDER: +1.50
OS_CYLINDER: +1.00
OD_AXIS: 174

## 2024-09-12 ASSESSMENT — EXTERNAL EXAM - RIGHT EYE: OD_EXAM: NORMAL

## 2024-09-12 ASSESSMENT — TONOMETRY
OS_IOP_MMHG: 25
IOP_METHOD: TONOPEN
OD_IOP_MMHG: 19

## 2024-09-12 ASSESSMENT — VISUAL ACUITY
OS_CC: 20/25
METHOD: SNELLEN - LINEAR
OD_CC+: -2
OD_CC: 20/30
OS_CC+: +2

## 2024-09-12 ASSESSMENT — CUP TO DISC RATIO
OS_RATIO: 0.6
OD_RATIO: 0.3

## 2024-09-12 ASSESSMENT — EXTERNAL EXAM - LEFT EYE: OS_EXAM: NORMAL

## 2024-09-12 NOTE — PROGRESS NOTES
CC: follow up glaucoma and  Age related macular degeneration  HPI:  History of Age related macular degeneration. s/p  CE/IOL right eye 9/11/18 and left eye 10/10/18  Interval 09/12/24   : No changes to vision. Flashing lights have stopped. Metamorphopsia stable.   Frequent ocular migraines right eye; she experienced positive visual phenomena almost everyday  PMH: MS under control       Imaging    OCT 09/12/24    Right eye: subretinal hyperreflectivity; drusen without  subretinal fluid,  VMA  Left eye: few drusen; stable. VMA     ONFL 06/16/22   Right eye: within normal limits   Left eye: stable TS thinning    FAF 7-1-21  RE: mottled nasal macular hypo/hyperAF, stable  LE: minimal ST macular hypoAF, stable    OVF 24-2 06/12/23     Right eye: scattered spots of decreased sensitivity (non glaucomatous appearance) MD -5.2 (prior -3.5)  Left eye: scattered spots of decreased sensitivity; possible sup arcuate (non glaucomatous appearance) MD -11.3 (worse compared to prior -5.9)    Assessment & Plan:    # history of Recurrent Exudative Age related macular degeneration right eye     - s/p previous Avastin (x4 in 7/9/2013)   - New exudative changes noted 04/03/23 with VA decreased to 20/60   - status post avastin x3 last 06/12/23 ; now on Vabysmo; last injection June 20, 2024  Optical Coherence Tomography 09/12/24 right eye with stable subretinal hyperreflectivity; drusen without  subretinal fluid,   Doing well with Vabysmo- see below  # dry Age related macular degeneration left eye    - AREDS vitamins, Amsler grid monitoring weekly OCT stable, no injections needed    - (+) FH: both parents had Age related macular degeneration     # glaucoma suspect with mild cup:disc asymmetry   - IOP excellent   - ONFL normal right eye; stable TS thinning left eye    - OVF: scattered spots of decreased sensitivity (non glaucomatous appearance) both eyes    - observe without  Eyedrops   - follow up in one yr. Could alternate glaucoma  "testing given low susp.    # Hx of Optic neuritis left eye   Previously evaluated with neuro-ophthalmology     # History of multiple sclerosis diagnosed several ys ago   History of uveitis both eyes - no activity today 07/01/21    History of taking IV IG  and steroids   currently without Treatment   Consider follow up with neurologist    #- MGD each eye   - viscous discharged expressed from Meiboman glands   - warm compression discussed    #Ocular migraines   Remote history of migraines, restarted 4/2023 with primarily visual symptoms. Occur almost daily. Sees a kaleidoscope of colors that lasts 3 minutes.    -saw with Dr. Hernandez \"Patient with MS with history of optic neuritis here for follow-up at the request of her neurologist. Patient is asymptomatic of reoccurence of optic neuritis. RNFL and VF testing is stable compared to last visit in 2020\"   -Follow up with neuro-ophthalmology as needed     PLAN: .Vabysmo right eye   Follow up in 12 weeks with Optical Coherence Tomography possible Vabysmo  Patient likes 3 rounds of lido     ~~~~~~~~~   Complete documentation of historical and exam elements from today's encounter can be found in the full encounter summary report (not reduplicated in this progress note).  I personally obtained the chief complaint(s) and history of present illness.  I confirmed and edited as necessary the review of systems, past medical/surgical history, family history, social history, and examination findings as documented by others; and I examined the patient myself.  I personally reviewed the relevant tests, images, and reports as documented above.  I formulated and edited as necessary the assessment and plan and discussed the findings and management plan with the patient and family and No resident or fellow assisted with the procedures performed.  I performed the procedures myself.    Kesha Patel MD  .  Retina Service   Department of Ophthalmology and Visual " Neurosciences   [facility]  Phone: [phone]   Fax: [phone]

## 2024-09-12 NOTE — NURSING NOTE
Chief Complaints and History of Present Illnesses   Patient presents with    Exudative Macular Degeneration Follow Up     Chief Complaint(s) and History of Present Illness(es)       Exudative Macular Degeneration Follow Up              Laterality: right eye    Associated symptoms: dryness.  Negative for eye pain, itching and burning    Response to treatment: no improvement    Pain scale: 0/10              Comments    Sondra is here to continue care for exudative macular degeneration of right eye. Sometimes feels burning both eyes, but not today.    [place] COT 12:54 PM September 12, 2024

## 2024-10-15 ENCOUNTER — TELEPHONE (OUTPATIENT)
Dept: OPHTHALMOLOGY | Facility: CLINIC | Age: 74
End: 2024-10-15
Payer: MEDICARE

## 2024-10-15 NOTE — TELEPHONE ENCOUNTER
[facility] Call Center    Phone Message    May a detailed message be left on voicemail: yes     Reason for Call: Symptoms or Concerns     If patient has red-flag symptoms, warm transfer to triage line    Current symptom or concern: pt advised she has a bunch of floaters show up approx 4 days ago in her left eye, per pt they look like one is big and it is attached to a long string and are moving     Symptoms have been present for:  4 days    Has patient previously been seen for this? No    By : N/A    Date: N/A    Are there any new or worsening symptoms? Yes:     Action Taken: Message routed to:  [facility] eye    Travel Screening: Not Applicable     Date of Service:

## 2024-10-16 NOTE — TELEPHONE ENCOUNTER
I spoke to the patient who notes she has new right eye floaters.  She has no flashes of light or vision change.  She would prefer not to come here for this as she comes next month for injection.  She will look for local eye doctor.  The patient will call if she has further questions.

## 2024-11-27 DIAGNOSIS — H35.3211 EXUDATIVE AGE-RELATED MACULAR DEGENERATION OF RIGHT EYE WITH ACTIVE CHOROIDAL NEOVASCULARIZATION (H): Primary | ICD-10-CM

## 2024-12-05 ENCOUNTER — OFFICE VISIT (OUTPATIENT)
Dept: OPHTHALMOLOGY | Facility: CLINIC | Age: 74
End: 2024-12-05
Attending: OPHTHALMOLOGY
Payer: MEDICARE

## 2024-12-05 DIAGNOSIS — H35.3211 EXUDATIVE AGE-RELATED MACULAR DEGENERATION OF RIGHT EYE WITH ACTIVE CHOROIDAL NEOVASCULARIZATION (H): ICD-10-CM

## 2024-12-05 DIAGNOSIS — H40.052 BORDERLINE GLAUCOMA OF LEFT EYE WITH OCULAR HYPERTENSION: Primary | ICD-10-CM

## 2024-12-05 PROBLEM — N32.9 DISORDER OF BLADDER: Status: ACTIVE | Noted: 2019-08-27

## 2024-12-05 PROBLEM — R20.2 NUMBNESS AND TINGLING OF LEFT LEG: Status: ACTIVE | Noted: 2021-04-29

## 2024-12-05 PROBLEM — M54.42 ACUTE LEFT-SIDED LOW BACK PAIN WITH LEFT-SIDED SCIATICA: Status: ACTIVE | Noted: 2021-04-29

## 2024-12-05 PROBLEM — R20.0 NUMBNESS AND TINGLING OF LEFT LEG: Status: ACTIVE | Noted: 2021-04-29

## 2024-12-05 PROBLEM — M25.542 ARTHRALGIA OF HANDS, BILATERAL: Status: ACTIVE | Noted: 2021-02-02

## 2024-12-05 PROBLEM — M51.369 DISC DEGENERATION, LUMBAR: Status: ACTIVE | Noted: 2021-04-29

## 2024-12-05 PROBLEM — H35.3210 EXUDATIVE AGE-RELATED MACULAR DEGENERATION OF RIGHT EYE (H): Status: ACTIVE | Noted: 2022-05-09

## 2024-12-05 PROBLEM — M54.16 LUMBAR RADICULOPATHY: Status: ACTIVE | Noted: 2021-04-29

## 2024-12-05 PROBLEM — M25.541 ARTHRALGIA OF HANDS, BILATERAL: Status: ACTIVE | Noted: 2021-02-02

## 2024-12-05 PROCEDURE — 67028 INJECTION EYE DRUG: CPT | Mod: RT | Performed by: OPHTHALMOLOGY

## 2024-12-05 PROCEDURE — 92134 CPTRZ OPH DX IMG PST SGM RTA: CPT | Performed by: OPHTHALMOLOGY

## 2024-12-05 RX ORDER — TRETINOIN 0.25 MG/G
CREAM TOPICAL
COMMUNITY
Start: 2024-10-16

## 2024-12-05 RX ORDER — LATANOPROST 50 UG/ML
1 SOLUTION/ DROPS OPHTHALMIC AT BEDTIME
Qty: 7.5 ML | Refills: 4 | Status: SHIPPED | OUTPATIENT
Start: 2024-12-05

## 2024-12-05 RX ADMIN — FARICIMAB 6 MG: 6 INJECTION, SOLUTION INTRAVITREAL at 13:53

## 2024-12-05 ASSESSMENT — REFRACTION_WEARINGRX
OD_AXIS: 174
OS_CYLINDER: +1.00
OD_CYLINDER: +1.50
OD_SPHERE: +0.75
SPECS_TYPE: PAL
OS_SPHERE: -0.50
OS_AXIS: 176

## 2024-12-05 ASSESSMENT — SLIT LAMP EXAM - LIDS
COMMENTS: MGD
COMMENTS: MGD

## 2024-12-05 ASSESSMENT — TONOMETRY
OS_IOP_MMHG: 23
IOP_METHOD: TONOPEN
OS_IOP_MMHG: 27
OD_IOP_MMHG: 17
IOP_METHOD: TONOPEN

## 2024-12-05 ASSESSMENT — CUP TO DISC RATIO
OD_RATIO: 0.3
OS_RATIO: 0.6

## 2024-12-05 ASSESSMENT — VISUAL ACUITY
CORRECTION_TYPE: GLASSES
OS_CC: 20/20
METHOD: SNELLEN - LINEAR
OS_CC+: -2
OD_CC+: +2
OD_CC: 20/25

## 2024-12-05 ASSESSMENT — EXTERNAL EXAM - LEFT EYE: OS_EXAM: NORMAL

## 2024-12-05 ASSESSMENT — EXTERNAL EXAM - RIGHT EYE: OD_EXAM: NORMAL

## 2024-12-05 NOTE — NURSING NOTE
Chief Complaints and History of Present Illnesses   Patient presents with    Exudative Macular Degeneration Follow Up     12wk follow up - Exudative age-related macular degeneration of right eye with active choroidal neovascularization      Chief Complaint(s) and History of Present Illness(es)       Exudative Macular Degeneration Follow Up              Comments: 12wk follow up - Exudative age-related macular degeneration of right eye with active choroidal neovascularization               Comments    Pt states vision is stable, no new changes. Pt states eyes feel dry and gritty - having tearing and discharge mainly right>left. Denies any eye pain, flashes, or floaters. No itchiness or burning sensation.     Treatment:   AT daily both eyes   AREDS BID     Torie Perez 12:46 PM  December 5, 2024

## 2024-12-05 NOTE — PROGRESS NOTES
CC: follow up glaucoma and  Age related macular degeneration  HPI:  History of Age related macular degeneration. s/p  CE/IOL right eye 9/11/18 and left eye 10/10/18  Interval 09/12/24   : No changes to vision. Flashing lights have stopped. Metamorphopsia stable.   Frequent ocular migraines right eye; she experienced positive visual phenomena almost everyday  PMH: MS under control       Imaging    OCT 09/12/24    Right eye: subretinal hyperreflectivity; drusen without  subretinal fluid,  VMA  Left eye: few drusen; stable. VMA     ONFL 06/16/22   Right eye: within normal limits   Left eye: stable TS thinning    FAF 7-1-21  RE: mottled nasal macular hypo/hyperAF, stable  LE: minimal ST macular hypoAF, stable    OVF 24-2 06/12/23     Right eye: scattered spots of decreased sensitivity (non glaucomatous appearance) MD -5.2 (prior -3.5)  Left eye: scattered spots of decreased sensitivity; possible sup arcuate (non glaucomatous appearance) MD -11.3 (worse compared to prior -5.9)    Assessment & Plan:    # history of Recurrent Exudative Age related macular degeneration right eye     - s/p previous Avastin (x4 in 7/9/2013)   - New exudative changes noted 04/03/23 with VA decreased to 20/60   - status post avastin x3 last 06/12/23 ; change to Vabysmo 7,2023  Optical Coherence Tomography 09/12/24 ; 12/05/24 right eye with stable subretinal hyperreflectivity; drusen without  subretinal fluid,   Recommend to continue Vabysmo- see below    # dry Age related macular degeneration left eye    - AREDS vitamins, Amsler grid monitoring weekly OCT stable, no injections needed    - (+) FH: both parents had Age related macular degeneration     # glaucoma suspect left eye   Borderline glaucoma with ocular hypertension   with cup:disc asymmetry     - IOP 17/23   - ONFL normal right eye; stable TS thinning left eye    - OVF: scattered spots of decreased sensitivity (non glaucomatous appearance) both eyes    - previously observe without   "Eyedrops  12/05/24 given c:d ration asymmetry; difference of intraocular pressure right eye 17; left eye 27;23 and changes of retinal nerve fiber layer will start   Latanoprost at bedtime   Next follow up retinal nerve fiber layer     # Hx of Optic neuritis left eye   Previously evaluated with neuro-ophthalmology     # History of multiple sclerosis diagnosed several ys ago   History of uveitis both eyes - no activity today 07/01/21    History of taking IV IG  and steroids   currently without Treatment   Consider follow up with neurologist    #- MGD each eye   - viscous discharged expressed from Meiboman glands   - warm compression discussed    #Ocular migraines   Remote history of migraines, restarted 4/2023 with primarily visual symptoms. Occur almost daily. Sees a kaleidoscope of colors that lasts 3 minutes.    -saw with Dr. Hernandez \"Patient with MS with history of optic neuritis here for follow-up at the request of her neurologist. Patient is asymptomatic of reoccurence of optic neuritis. RNFL and VF testing is stable compared to last visit in 2020\"   -Follow up with neuro-ophthalmology as needed     PLAN: .Vabysmo right eye 12/05/24   Latanoprost at bedtime left eye starting 12/05/24   Follow up in 10-12 weeks with Optical Coherence Tomography; retinal nerve fiber layer and Vabysmo  Dilated 12/05/24   Will dilate every other ciro   Patient likes 3 rounds of lido     ~~~~~~~~~   Complete documentation of historical and exam elements from today's encounter can be found in the full encounter summary report (not reduplicated in this progress note).  I personally obtained the chief complaint(s) and history of present illness.  I confirmed and edited as necessary the review of systems, past medical/surgical history, family history, social history, and examination findings as documented by others; and I examined the patient myself.  I personally reviewed the relevant tests, images, and reports as documented above.  I " formulated and edited as necessary the assessment and plan and discussed the findings and management plan with the patient and family and No resident or fellow assisted with the procedures performed.  I performed the procedures myself.    Kesha Patel MD  .  Retina Service   Department of Ophthalmology and Visual Neurosciences   [facility]  Phone: [phone]   Fax: [phone]

## 2025-02-19 DIAGNOSIS — H35.3211 EXUDATIVE AGE-RELATED MACULAR DEGENERATION OF RIGHT EYE WITH ACTIVE CHOROIDAL NEOVASCULARIZATION (H): Primary | ICD-10-CM

## 2025-02-19 DIAGNOSIS — H40.052 BORDERLINE GLAUCOMA OF LEFT EYE WITH OCULAR HYPERTENSION: ICD-10-CM

## 2025-02-27 ENCOUNTER — OFFICE VISIT (OUTPATIENT)
Dept: OPHTHALMOLOGY | Facility: CLINIC | Age: 75
End: 2025-02-27
Attending: OPHTHALMOLOGY
Payer: MEDICARE

## 2025-02-27 DIAGNOSIS — H35.3211 EXUDATIVE AGE-RELATED MACULAR DEGENERATION OF RIGHT EYE WITH ACTIVE CHOROIDAL NEOVASCULARIZATION (H): Primary | ICD-10-CM

## 2025-02-27 DIAGNOSIS — H40.052 BORDERLINE GLAUCOMA OF LEFT EYE WITH OCULAR HYPERTENSION: ICD-10-CM

## 2025-02-27 PROCEDURE — 92133 CPTRZD OPH DX IMG PST SGM ON: CPT | Performed by: OPHTHALMOLOGY

## 2025-02-27 PROCEDURE — 92134 CPTRZ OPH DX IMG PST SGM RTA: CPT | Performed by: OPHTHALMOLOGY

## 2025-02-27 PROCEDURE — 250N000011 HC RX IP 250 OP 636: Performed by: OPHTHALMOLOGY

## 2025-02-27 PROCEDURE — 67028 INJECTION EYE DRUG: CPT | Mod: RT | Performed by: OPHTHALMOLOGY

## 2025-02-27 RX ADMIN — FARICIMAB 6 MG: 6 INJECTION, SOLUTION INTRAVITREAL at 13:15

## 2025-02-27 ASSESSMENT — CUP TO DISC RATIO
OD_RATIO: 0.3
OS_RATIO: 0.6

## 2025-02-27 ASSESSMENT — CONF VISUAL FIELD
OD_SUPERIOR_NASAL_RESTRICTION: 0
OS_INFERIOR_TEMPORAL_RESTRICTION: 0
OS_NORMAL: 1
OD_SUPERIOR_TEMPORAL_RESTRICTION: 0
OD_INFERIOR_TEMPORAL_RESTRICTION: 0
OD_INFERIOR_NASAL_RESTRICTION: 0
OD_NORMAL: 1
OS_INFERIOR_NASAL_RESTRICTION: 0
OS_SUPERIOR_TEMPORAL_RESTRICTION: 0
OS_SUPERIOR_NASAL_RESTRICTION: 0

## 2025-02-27 ASSESSMENT — VISUAL ACUITY
OD_CC+: -2
OS_CC+: -1
OD_CC: 20/20
OS_CC: 20/20
CORRECTION_TYPE: GLASSES
METHOD: SNELLEN - LINEAR

## 2025-02-27 ASSESSMENT — TONOMETRY
IOP_METHOD: TONOPEN
OS_IOP_MMHG: 23
OD_IOP_MMHG: 17
OS_IOP_MMHG: 19
IOP_METHOD: TONOPEN

## 2025-02-27 ASSESSMENT — SLIT LAMP EXAM - LIDS
COMMENTS: MGD
COMMENTS: MGD

## 2025-02-27 ASSESSMENT — EXTERNAL EXAM - LEFT EYE: OS_EXAM: NORMAL

## 2025-02-27 ASSESSMENT — EXTERNAL EXAM - RIGHT EYE: OD_EXAM: NORMAL

## 2025-02-27 NOTE — NURSING NOTE
Chief Complaints and History of Present Illnesses   Patient presents with    Exudative ARMD follow-up      Chief Complaint(s) and History of Present Illness(es)       Exudative ARMD follow-up                Comments    Photophobia and ache os, began a week after using - Latanoprost- switched drops.   Od began aching 1 wk ago - lateral lid skin very sore uziel when moved the eye ball and tender-to-the-touch. Denies redness of eyeball or lid skin.   Vision stable ou near and far.   Denies new flashes, floaters curtain.   Ocular migraine less often.     Requesting the name of another retinal specialist near (in [place] near [facility]).     Ocular meds:   Brimonidine os bid (see phone call, switched from Latanoprost).   Refresh PF ou bid-tid     AREDs 2 tablets daily      Eliana ANDREWS, February 27, 2025 12:23 PM

## 2025-02-27 NOTE — PROGRESS NOTES
CC: follow up glaucoma and  Age related macular degeneration    HPI:  History of Age related macular degeneration. s/p  CE/IOL right eye 9/11/18 and left eye 10/10/18  Interval 09/12/24   : No changes to vision. Flashing lights have stopped. Metamorphopsia stable.   Frequent ocular migraines right eye; she experienced positive visual phenomena almost everyday  PMH: MS under control       Imaging    OCT 02/27/25     Right eye: subretinal hyperreflectivity; drusen without  subretinal fluid,  VMA  Left eye: few drusen; stable. VMA     ONFL 02/27/25   Right eye: within normal limits   Left eye: stable TS thinning    FAF 7-1-21  RE: mottled nasal macular hypo/hyperAF, stable  LE: minimal ST macular hypoAF, stable    OVF 24-2 06/12/23     Right eye: scattered spots of decreased sensitivity (non glaucomatous appearance) MD -5.2 (prior -3.5)  Left eye: scattered spots of decreased sensitivity; possible sup arcuate (non glaucomatous appearance) MD -11.3 (worse compared to prior -5.9)    Assessment & Plan:    # history of Recurrent Exudative Age related macular degeneration right eye     - s/p previous Avastin (x4 in 7/9/2013)   - New exudative changes noted 04/03/23 with VA decreased to 20/60   - status post avastin x3 last 06/12/23 ; change to Vabysmo 7,2023  Optical Coherence Tomography 09/12/24 ; 12/05/24 right eye with stable subretinal hyperreflectivity; drusen without  subretinal fluid,   Recommend to continue Vabysmo- see below    # dry Age related macular degeneration left eye    - AREDS vitamins, Amsler grid monitoring weekly OCT stable, no injections needed    - (+) FH: both parents had Age related macular degeneration     # glaucoma suspect left eye   Borderline glaucoma with ocular hypertension   with cup:disc asymmetry     - IOP 17/23   - ONFL normal right eye; stable TS thinning left eye    - OVF: scattered spots of decreased sensitivity (non glaucomatous appearance) both eyes    - previously observe without   "Eyedrops  12/05/24 given c:d ration asymmetry; difference of intraocular pressure right eye 17; left eye 27;23 and changes of retinal nerve fiber layer will start   Latanoprost at bedtime   01/10/25 2:32 PM ADDENDUM: Developed light sensitivity and aches since starting latanoprost so patient self discontinued.   Changed to brimonidine BID OS to replace.   Doing well with brimonidine    # Hx of Optic neuritis left eye   Previously evaluated with neuro-ophthalmology     # History of multiple sclerosis diagnosed several ys ago   History of uveitis both eyes - no activity today 07/01/21    History of taking IV IG  and steroids   currently without Treatment   Consider follow up with neurologist    #- MGD each eye   - viscous discharged expressed from [place] glands   - warm compression discussed    #Ocular migraines   Remote history of migraines, restarted 4/2023 with primarily visual symptoms. Occur almost daily. Sees a kaleidoscope of colors that lasts 3 minutes.    -saw with Dr. Hernandez \"Patient with MS with history of optic neuritis here for follow-up at the request of her neurologist. Patient is asymptomatic of reoccurence of optic neuritis. RNFL and VF testing is stable compared to last visit in 2020\"   -Follow up with neuro-ophthalmology as needed     PLAN: .Vabysmo right eye 12/05/24 ; 02/27/25   Continue brimonidine BID OS  Follow up in 10-12 weeks with Optical Coherence Tomography and Vabysmo and dilation both eyes   Dilated 12/05/24   Will dilate every other ciro   Patient likes 3 rounds of lido     ~~~~~~~~~   Complete documentation of historical and exam elements from today's encounter can be found in the full encounter summary report (not reduplicated in this progress note).  I personally obtained the chief complaint(s) and history of present illness.  I confirmed and edited as necessary the review of systems, past medical/surgical history, family history, social history, and examination findings as documented " by others; and I examined the patient myself.  I personally reviewed the relevant tests, images, and reports as documented above.  I formulated and edited as necessary the assessment and plan and discussed the findings and management plan with the patient and family and No resident or fellow assisted with the procedures performed.  I performed the procedures myself.    Kesha Patel MD  .  Retina Service   Department of Ophthalmology and Visual Neurosciences   [facility]  Phone: [phone]   Fax: [phone]

## 2025-05-23 ENCOUNTER — TRANSFERRED RECORDS (OUTPATIENT)
Dept: HEALTH INFORMATION MANAGEMENT | Facility: CLINIC | Age: 75
End: 2025-05-23
Payer: MEDICARE